# Patient Record
Sex: FEMALE | Race: BLACK OR AFRICAN AMERICAN | NOT HISPANIC OR LATINO | Employment: FULL TIME | URBAN - METROPOLITAN AREA
[De-identification: names, ages, dates, MRNs, and addresses within clinical notes are randomized per-mention and may not be internally consistent; named-entity substitution may affect disease eponyms.]

---
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
## 2018-01-20 ENCOUNTER — OFFICE VISIT (OUTPATIENT)
Dept: URGENT CARE | Facility: URGENT CARE | Age: 18
End: 2018-01-20
Payer: COMMERCIAL

## 2018-01-20 ENCOUNTER — RADIANT APPOINTMENT (OUTPATIENT)
Dept: GENERAL RADIOLOGY | Facility: CLINIC | Age: 18
End: 2018-01-20
Attending: PEDIATRICS
Payer: COMMERCIAL

## 2018-01-20 VITALS
SYSTOLIC BLOOD PRESSURE: 113 MMHG | RESPIRATION RATE: 16 BRPM | TEMPERATURE: 97.1 F | BODY MASS INDEX: 44.99 KG/M2 | OXYGEN SATURATION: 100 % | HEART RATE: 84 BPM | WEIGHT: 242.8 LBS | DIASTOLIC BLOOD PRESSURE: 79 MMHG

## 2018-01-20 DIAGNOSIS — M25.562 ACUTE PAIN OF LEFT KNEE: ICD-10-CM

## 2018-01-20 DIAGNOSIS — M25.562 ACUTE PAIN OF LEFT KNEE: Primary | ICD-10-CM

## 2018-01-20 PROCEDURE — 99213 OFFICE O/P EST LOW 20 MIN: CPT | Performed by: PEDIATRICS

## 2018-01-20 PROCEDURE — 73560 X-RAY EXAM OF KNEE 1 OR 2: CPT | Mod: LT

## 2018-01-20 ASSESSMENT — ENCOUNTER SYMPTOMS
EYE DISCHARGE: 0
TREMORS: 0
DIZZINESS: 0
FALLS: 1
PALPITATIONS: 0
HEADACHES: 0
FEVER: 0
CHILLS: 0
BLURRED VISION: 0
WEIGHT LOSS: 0
DOUBLE VISION: 0
BRUISES/BLEEDS EASILY: 0
TINGLING: 0

## 2018-01-20 NOTE — NURSING NOTE
"Chief Complaint   Patient presents with     Musculoskeletal Problem     left knee pain       Initial /79 (BP Location: Left arm, Patient Position: Chair, Cuff Size: Adult Large)  Pulse 84  Temp 97.1  F (36.2  C) (Oral)  Resp 16  Wt 242 lb 12.8 oz (110.1 kg)  SpO2 100%  Breastfeeding? No  BMI 44.99 kg/m2 Estimated body mass index is 44.99 kg/(m^2) as calculated from the following:    Height as of 11/21/17: 5' 1.6\" (1.565 m).    Weight as of this encounter: 242 lb 12.8 oz (110.1 kg).  Medication Reconciliation: complete       Mercy Bergman MA      "

## 2018-01-20 NOTE — PROGRESS NOTES
SUBJECTIVE:   Kat Rodriguez is a 17 year old female presenting with a chief complaint of   Chief Complaint   Patient presents with     Musculoskeletal Problem     left knee pain   .    Onset of symptoms was 2 day(s) ago.  Location: left knee  Context:       The injury happened while outside GYM activities (non school related)       Mechanism: trauma: landed on left knee and heard a cracking      Patient experienced can't straighten out, weight on it will hurt  Course of symptoms is worsening.    Severity moderate  Current and Associated symptoms: Pain  Denies  Swelling, Bruising, Warmth, Redness and Tenderness  Aggravating Factors: weight-bearing, movement, flexion/extension and standing  Therapies to improve symptoms include: ice, ibuprofen and elevation  This is the first time this type of problem has occurred for this patient.     Review of Systems   Constitutional: Negative for chills, fever, malaise/fatigue and weight loss.   Eyes: Negative for blurred vision, double vision and discharge.   Cardiovascular: Negative for chest pain, palpitations and leg swelling.   Musculoskeletal: Positive for falls.   Skin: Negative for rash.   Neurological: Negative for dizziness, tingling, tremors and headaches.   Endo/Heme/Allergies: Does not bruise/bleed easily.         Past Medical History:   Diagnosis Date     Asthma      Current Outpatient Prescriptions   Medication Sig Dispense Refill     order for DME Equipment being ordered: L Knee sleeve, crutches 1 each 0     fluticasone (FLOVENT HFA) 110 MCG/ACT inhaler Inhale 2 puffs into the lungs 2 times daily for asthma prevention. 1 Inhaler 2     albuterol (ALBUTEROL) 108 (90 BASE) MCG/ACT inhaler Inhale 2 puffs into the lungs every 4 hours as needed for asthma symptoms. 2 Inhaler 1     Spacer/Aero Chamber Mouthpiece MISC Use with albuterol and flovent 2 each 0     ipratropium - albuterol 0.5 mg/2.5 mg/3 mL (DUONEB) 0.5-2.5 (3) MG/3ML neb solution Take 1 vial (3 mLs) by  nebulization once for 1 dose 3 mL 0     EPINEPHrine (EPIPEN) 0.3 MG/0.3ML injection Inject 0.3 mLs into the muscle once as needed for anaphylaxis. (Patient not taking: Reported on 1/20/2018) 2 each 3     Social History   Substance Use Topics     Smoking status: Passive Smoke Exposure - Never Smoker     Smokeless tobacco: Never Used      Comment: father smokes     Alcohol use No       OBJECTIVE  113/79  HR 84  AF  DEBBIE  Physical Exam   Constitutional: She is oriented to person, place, and time.   Musculoskeletal: She exhibits no edema, tenderness or deformity.   Neurological: She is oriented to person, place, and time. GCS score is 15.   Cannot bear weight on the L, unable to straighten L knee actively or passively   Skin: Skin is warm and dry. No rash noted. No erythema.   Psychiatric: Affect normal.       Labs:  No results found for this or any previous visit (from the past 24 hour(s)).    X-Ray was done, my findings are: no fracture seen    ASSESSMENT:      ICD-10-CM    1. Acute pain of left knee M25.562 XR Knee Left 1/2 Views     order for DME        Medical Decision Making:    Serious Comorbid Conditions:  Adult:  obesity    PLAN:  Knee sleeve  Regular exercises of the L knee  Crutches  F/u with PCP in 4-5 days. Recommend PT referral at that time if no improvement    Followup:    If not improving or if condition worsens, follow up with your Primary Care Provider    Kei Corbett MD, MPH

## 2018-01-20 NOTE — MR AVS SNAPSHOT
After Visit Summary   1/20/2018    Kat Rodriguez    MRN: [id number]           Patient Information     Date Of Birth          2000        Visit Information        Provider Department      1/20/2018 9:45 AM Kei Corbett MD [facility]        Today's Diagnoses     Acute pain of left knee    -  1       Follow-ups after your visit        Who to contact     If you have questions or need follow up information about today's clinic visit or your schedule please contact [facility] directly at [phone].  Normal or non-critical lab and imaging results will be communicated to you by InGameNowhart, letter or phone within 4 business days after the clinic has received the results. If you do not hear from us within 7 days, please contact the clinic through MarketBridget or phone. If you have a critical or abnormal lab result, we will notify you by phone as soon as possible.  Submit refill requests through WaveRx or call your pharmacy and they will forward the refill request to us. Please allow 3 business days for your refill to be completed.          Additional Information About Your Visit        MyChart Information     [employer] gives you secure access to your electronic health record. If you see a primary care provider, you can also send messages to your care team and make appointments. If you have questions, please call your primary care clinic.  If you do not have a primary care provider, please call [phone] and they will assist you.        Care EveryWhere ID     This is your Care EveryWhere ID. This could be used by other organizations to access your [place] medical records  [id number]        Your Vitals Were     Pulse Temperature Respirations Pulse Oximetry Breastfeeding? BMI (Body Mass Index)    84 97.1  F (36.2  C) (Oral) 16 100% No 44.99 kg/m2       Blood Pressure from Last 3 Encounters:   01/20/18 113/79   11/19/17 109/72   11/15/17 114/75    Weight from Last 3  Encounters:   01/20/18 242 lb 12.8 oz (110.1 kg) (>99 %)*   11/21/17 233 lb 4.8 oz (105.8 kg) (>99 %)*   11/19/17 241 lb (109.3 kg) (>99 %)*     * Growth percentiles are based on [facility] 2-20 Years data.                 Today's Medication Changes          These changes are accurate as of: 1/20/18 12:59 PM.  If you have any questions, ask your nurse or doctor.               Start taking these medicines.        Dose/Directions    order for DME   Used for:  Acute pain of left knee   Started by:  Kei Corbett MD        Equipment being ordered: L Knee sleeve, crutches   Quantity:  1 each   Refills:  0            Where to get your medicines      Some of these will need a paper prescription and others can be bought over the counter.  Ask your nurse if you have questions.     Bring a paper prescription for each of these medications     order for DME                Primary Care Provider    Martita Miramontes PA-C       No address on file        Equal Access to Services     JERROD ROWAN : Hadii aad ku hadasho Sorosana, waaxda luqadaha, qaybta kaalmada adeegyada, katelyn madden . So [facility] [phone].    ATENCIÓN: Si habla español, tiene a guerrier disposición servicios gratuitos de asistencia lingüística. Llame al [phone].    We comply with applicable federal civil rights laws and [place] laws. We do not discriminate on the basis of race, color, national origin, age, disability, sex, sexual orientation, or gender identity.            Thank you!     Thank you for choosing [facility]  for your care. Our goal is always to provide you with excellent care. Hearing back from our patients is one way we can continue to improve our services. Please take a few minutes to complete the written survey that you may receive in the mail after your visit with us. Thank you!             Your Updated Medication List - Protect others around you: Learn how to safely use, store and throw away your  medicines at www.disposemymeds.org.          This list is accurate as of: 1/20/18 12:59 PM.  Always use your most recent med list.                   Brand Name Dispense Instructions for use Diagnosis    albuterol 108 (90 BASE) MCG/ACT Inhaler    PROAIR HFA    2 Inhaler    Inhale 2 puffs into the lungs every 4 hours as needed for asthma symptoms.    Mild persistent asthma without complication       EPINEPHrine 0.3 MG/0.3ML injection    EPIPEN    2 each    Inject 0.3 mLs into the muscle once as needed for anaphylaxis.    Allergy to peanuts       fluticasone 110 MCG/ACT Inhaler    FLOVENT HFA    1 Inhaler    Inhale 2 puffs into the lungs 2 times daily for asthma prevention.    Mild persistent asthma without complication       ipratropium - albuterol 0.5 mg/2.5 mg/3 mL 0.5-2.5 (3) MG/3ML neb solution    DUONEB    3 mL    Take 1 vial (3 mLs) by nebulization once for 1 dose    Acute bronchospasm       order for DME     1 each    Equipment being ordered: L Knee sleeve, crutches    Acute pain of left knee       Spacer/Aero Chamber Mouthpiece Misc     2 each    Use with albuterol and flovent    Mild persistent asthma

## 2018-02-06 ENCOUNTER — APPOINTMENT (OUTPATIENT)
Dept: GENERAL RADIOLOGY | Facility: CLINIC | Age: 18
End: 2018-02-06
Attending: EMERGENCY MEDICINE
Payer: COMMERCIAL

## 2018-02-06 ENCOUNTER — HOSPITAL ENCOUNTER (EMERGENCY)
Facility: CLINIC | Age: 18
Discharge: HOME | End: 2018-02-06
Attending: EMERGENCY MEDICINE | Admitting: EMERGENCY MEDICINE
Payer: COMMERCIAL

## 2018-02-06 VITALS
RESPIRATION RATE: 16 BRPM | DIASTOLIC BLOOD PRESSURE: 64 MMHG | SYSTOLIC BLOOD PRESSURE: 110 MMHG | HEART RATE: 74 BPM | TEMPERATURE: 97.3 F | OXYGEN SATURATION: 97 %

## 2018-02-06 DIAGNOSIS — M25.562 ACUTE PAIN OF LEFT KNEE: ICD-10-CM

## 2018-02-06 PROCEDURE — 99283 EMERGENCY DEPT VISIT LOW MDM: CPT | Mod: Z6 | Performed by: EMERGENCY MEDICINE

## 2018-02-06 PROCEDURE — 25000132 ZZH RX MED GY IP 250 OP 250 PS 637: Performed by: EMERGENCY MEDICINE

## 2018-02-06 PROCEDURE — 73562 X-RAY EXAM OF KNEE 3: CPT | Mod: LT

## 2018-02-06 PROCEDURE — 99283 EMERGENCY DEPT VISIT LOW MDM: CPT | Performed by: EMERGENCY MEDICINE

## 2018-02-06 RX ORDER — NAPROXEN 500 MG/1
500 TABLET ORAL 2 TIMES DAILY WITH MEALS
Qty: 16 TABLET | Refills: 0 | Status: SHIPPED | OUTPATIENT
Start: 2018-02-06 | End: 2018-02-14

## 2018-02-06 RX ORDER — IBUPROFEN 600 MG/1
600 TABLET, FILM COATED ORAL ONCE
Status: COMPLETED | OUTPATIENT
Start: 2018-02-06 | End: 2018-02-06

## 2018-02-06 RX ADMIN — IBUPROFEN 600 MG: 600 TABLET ORAL at 11:10

## 2018-02-06 ASSESSMENT — ENCOUNTER SYMPTOMS
ARTHRALGIAS: 1
WEAKNESS: 0
COLOR CHANGE: 0
NUMBNESS: 0
WOUND: 0
NECK STIFFNESS: 0
NECK PAIN: 0
ABDOMINAL PAIN: 0
SHORTNESS OF BREATH: 0
FEVER: 0

## 2018-02-06 NOTE — ED PROVIDER NOTES
History     Chief Complaint   Patient presents with     Knee Injury     hurt her left knee yesterday while at practice for [employer]  Kat Rodriguez is a 17 year old female who presents with a left knee injury. The patient reports she injured her left knee a few weeks ago while practicing [employer]. She states her pain improved without treatment so she returned to practice yesterday; however, while at [employer] yesterday she states she went for a takedown and felt a pop in her left knee. She states she now has non-localized, constant, diffuse, sharp, nonradiating left knee pain with inability to bear weight.  Movement makes it worse, rest improves it.  She does not have a history of injury on this knee. She has a previous ACL, MCL and meniscus tear in her right knee. She denies any other injury.  No fevers or skin color changes in left lower extremity.    Past Medical History:   Diagnosis Date     Asthma        Past Surgical History:   Procedure Laterality Date     ARTHROSCOPIC RECONSTRUCTION ANTERIOR CRUCIATE LIGAMENT Right 1/29/2015    Procedure: ARTHROSCOPIC RECONSTRUCTION ANTERIOR CRUCIATE LIGAMENT;  Surgeon: Emile Menendez MD;  Location: [facility] OR     NO HISTORY OF SURGERY         Family History   Problem Relation Age of Onset     Obesity Mother      Hypertension Maternal Grandmother      Hyperlipidemia Maternal Grandmother      Obesity Maternal Grandfather        Social History   Substance Use Topics     Smoking status: Passive Smoke Exposure - Never Smoker     Smokeless tobacco: Never Used      Comment: father smokes     Alcohol use No       No current facility-administered medications for this encounter.      Current Outpatient Prescriptions   Medication     naproxen (NAPROSYN) 500 MG tablet     order for DME     ipratropium - albuterol 0.5 mg/2.5 mg/3 mL (DUONEB) 0.5-2.5 (3) MG/3ML neb solution     fluticasone (FLOVENT HFA) 110 MCG/ACT inhaler     albuterol (ALBUTEROL) 108 (90 BASE) MCG/ACT inhaler     EPINEPHrine  (EPIPEN) 0.3 MG/0.3ML injection     Spacer/Aero Chamber Mouthpiece MISC        Allergies   Allergen Reactions     Cats Itching     Fish      Nuts      Tree nuts, not peanuts     Trees Swelling         I have reviewed the Medications, Allergies, Past Medical and Surgical History, and Social History in the Epic system.    Review of Systems   Constitutional: Negative for fever.   Respiratory: Negative for shortness of breath.    Cardiovascular: Negative for chest pain.   Gastrointestinal: Negative for abdominal pain.   Musculoskeletal: Positive for arthralgias. Negative for neck pain and neck stiffness.   Skin: Negative for color change, pallor and wound.   Neurological: Negative for weakness and numbness.   All other systems reviewed and are negative.      Physical Exam   BP: 110/64  Pulse: 74  Temp: 97.3  F (36.3  C)  Resp: 16  SpO2: 97 %      Physical Exam   Constitutional: She is oriented to person, place, and time. She appears well-developed and well-nourished. No distress.   HENT:   Head: Normocephalic and atraumatic.   Eyes: Conjunctivae and EOM are normal. No scleral icterus.   Neck: Normal range of motion.   Cardiovascular: Normal rate and intact distal pulses.    Pulses:       Dorsalis pedis pulses are 2+ on the left side.        Posterior tibial pulses are 2+ on the left side.   Pulmonary/Chest: Effort normal. No respiratory distress.   Musculoskeletal: Normal range of motion. She exhibits tenderness. She exhibits no edema or deformity.   Tenderness to medial and anterior left knee.  No obvious deformity in left knee joint.  Full range of motion, active and passive, in left hip, knee, and ankle with pain in left knee with range of motion.  No tenderness over left ankle and left hip.   Neurological: She is alert and oriented to person, place, and time. She has normal strength and normal reflexes. She displays normal reflexes. No cranial nerve deficit or sensory deficit. She exhibits normal muscle tone. Gait (  due to pain in left leg) abnormal. Coordination normal. GCS eye subscore is 4. GCS verbal subscore is 5. GCS motor subscore is 6.   Reflex Scores:       Patellar reflexes are 2+ on the right side and 2+ on the left side.       Achilles reflexes are 2+ on the right side and 2+ on the left side.  Skin: Skin is warm. No rash noted. She is not diaphoretic. No erythema.   Psychiatric: She has a normal mood and affect. Her behavior is normal. Judgment and thought content normal.   Nursing note and vitals reviewed.      ED Course     ED Course     Procedures             Critical Care time:  none             Labs Ordered and Resulted from Time of ED Arrival Up to the Time of Departure from the ED - No data to display         Assessments & Plan (with Medical Decision Making)   17-year-old woman presenting with left knee pain. Differential diagnosis sprain, ligamentous injury, likely fracture, unlikely DVT or septic arthritis.    After thorough history and physical exam, patient appears to be in no acute distress.  I will obtain an x-ray of the left knee for further diagnostic evaluation and treat her pain with oral ibuprofen. She and her father agreed with the plan.     I reviewed patient's x-ray and I read the radiology report; there is no evidence of knee fracture. I went to speak to the patient and her father about the results, however, they were not in the room. I was informed by nursing that the patient and her father eloped from the Emergency Department.    I have reviewed the nursing notes.    I have reviewed the findings, diagnosis, plan and need for follow up with the patient.    Discharge Medication List as of 2/6/2018 12:52 PM      START taking these medications    Details   naproxen (NAPROSYN) 500 MG tablet Take 1 tablet (500 mg) by mouth 2 times daily (with meals) for 8 days, Disp-16 tablet, R-0, Local Print             Final diagnoses:   Acute pain of left knee   ISrinivas, am serving as a trained  medical scribe to document services personally performed by Augustus Locke MD, based on the provider's statements to me.      I, Augustus Locke MD, was physically present and have reviewed and verified the accuracy of this note documented by Srinivas Kennedy.       2/6/2018   [facility], [place], EMERGENCY DEPARTMENT     Robi Locke MD  02/06/18 [id number]

## 2018-02-06 NOTE — ED AVS SNAPSHOT
[facility], Emergency Department    [address]    [place] [address]    Phone:  [phone]    Fax:  [phone]                                       Kat Rodriguez   MRN: [id number]    Department:  [facility], Emergency Department   Date of Visit:  2/6/2018           Patient Information     Date Of Birth          2000        Your diagnoses for this visit were:     Acute pain of left knee        You were seen by Robi Locke MD.        Discharge Instructions       Please make an appointment to follow up with Sports Medicine (phone: [phone]) in 7 days for further evaluation and recommendations.  Please use the knee immobilizer and crutches to help with ambulation.  Please take naproxen for pain, if needed.      Discharge References/Attachments     KNEE PAIN (ENGLISH)      24 Hour Appointment Hotline       To make an appointment at any [facility] clinic, call [phone] ([phone]). If you don't have a family doctor or clinic, we will help you find one. [facility] clinics are conveniently located to serve the needs of you and your family.          ED Discharge Orders     Knee immobilizer                    Review of your medicines      START taking        Dose / Directions Last dose taken    naproxen 500 MG tablet   Commonly known as:  NAPROSYN   Dose:  500 mg   Quantity:  16 tablet        Take 1 tablet (500 mg) by mouth 2 times daily (with meals) for 8 days   Refills:  0          Our records show that you are taking the medicines listed below. If these are incorrect, please call your family doctor or clinic.        Dose / Directions Last dose taken    albuterol 108 (90 BASE) MCG/ACT Inhaler   Commonly known as:  PROAIR HFA   Dose:  2 puff   Quantity:  2 Inhaler        Inhale 2 puffs into the lungs every 4 hours as needed for asthma symptoms.   Refills:  1        EPINEPHrine 0.3 MG/0.3ML injection   Commonly known as:  EPIPEN   Dose:  0.3 mg   Quantity:  2 each        Inject 0.3  mLs into the muscle once as needed for anaphylaxis.   Refills:  3        fluticasone 110 MCG/ACT Inhaler   Commonly known as:  FLOVENT HFA   Dose:  2 puff   Quantity:  1 Inhaler        Inhale 2 puffs into the lungs 2 times daily for asthma prevention.   Refills:  2        ipratropium - albuterol 0.5 mg/2.5 mg/3 mL 0.5-2.5 (3) MG/3ML neb solution   Commonly known as:  DUONEB   Dose:  1 vial   Quantity:  3 mL        Take 1 vial (3 mLs) by nebulization once for 1 dose   Refills:  0        order for DME   Quantity:  1 each        Equipment being ordered: L Knee sleeve, crutches   Refills:  0        Spacer/Aero Chamber Mouthpiece Misc   Quantity:  2 each        Use with albuterol and flovent   Refills:  0                Prescriptions were sent or printed at these locations (1 Prescription)                   Other Prescriptions                Printed at Department/Unit printer (1 of 1)         naproxen (NAPROSYN) 500 MG tablet                Procedures and tests performed during your visit     Knee XR, 3 views, left      Orders Needing Specimen Collection     None      Pending Results     No orders found from 2/4/2018 to 2/7/2018.            Pending Culture Results     No orders found from 2/4/2018 to 2/7/2018.            Pending Results Instructions     If you had any lab results that were not finalized at the time of your Discharge, you can call the ED Lab Result RN at [phone]. You will be contacted by this team for any positive Lab results or changes in treatment. The nurses are available 7 days a week from 10A to 6:30P.  You can leave a message 24 hours per day and they will return your call.        Thank you for choosing [facility]       Thank you for choosing [facility] for your care. Our goal is always to provide you with excellent care. Hearing back from our patients is one way we can continue to improve our services. Please take a few minutes to complete the written survey that you may receive in the mail after  you visit with us. Thank you!        [employer] Information     [facility] [employer] gives you secure access to your electronic health record. If you see a primary care provider, you can also send messages to your care team and make appointments. If you have questions, please call your primary care clinic.  If you do not have a primary care provider, please call [phone] and they will assist you.        Care EveryWhere ID     This is your Care EveryWhere ID. This could be used by other organizations to access your [facility] medical records  [id number]        Equal Access to Services     NANCY ROWAN : Mauro Maza, joseph fuller, destiny newellaldemetria jaramillo, katelyn madden . So [facility] [phone].    ATENCIÓN: Si habla español, tiene a guerrier disposición servicios gratuitos de asistencia lingüística. Llame al [phone].    We comply with applicable federal civil rights laws and [place] laws. We do not discriminate on the basis of race, color, national origin, age, disability, sex, sexual orientation, or gender identity.            After Visit Summary       This is your record. Keep this with you and show to your community pharmacist(s) and doctor(s) at your next visit.

## 2018-02-06 NOTE — ED AVS SNAPSHOT
[facility], [place], Emergency Department    [address]    [facility] [address]    Phone:  [phone]    Fax:  [phone]                                       Kat Rodriguez   MRN: [id number]    Department:  [facility], Emergency Department   Date of Visit:  2/6/2018           After Visit Summary Signature Page     I have received my discharge instructions, and my questions have been answered. I have discussed any challenges I see with this plan with the nurse or doctor.    ..........................................................................................................................................  Patient/Patient Representative Signature      ..........................................................................................................................................  Patient Representative Print Name and Relationship to Patient    ..................................................               ................................................  Date                                            Time    ..........................................................................................................................................  Reviewed by Signature/Title    ...................................................              ..............................................  Date                                                            Time

## 2018-02-07 ENCOUNTER — OFFICE VISIT (OUTPATIENT)
Dept: ORTHOPEDICS | Facility: CLINIC | Age: 18
End: 2018-02-07
Payer: COMMERCIAL

## 2018-02-07 VITALS — WEIGHT: 242 LBS | BODY MASS INDEX: 44.53 KG/M2 | RESPIRATION RATE: 16 BRPM | HEIGHT: 62 IN

## 2018-02-07 DIAGNOSIS — S89.92XA KNEE INJURY, LEFT, INITIAL ENCOUNTER: Primary | ICD-10-CM

## 2018-02-07 PROCEDURE — 99214 OFFICE O/P EST MOD 30 MIN: CPT | Performed by: ORTHOPAEDIC SURGERY

## 2018-02-07 ASSESSMENT — PAIN SCALES - GENERAL: PAINLEVEL: MILD PAIN (3)

## 2018-02-07 NOTE — MR AVS SNAPSHOT
After Visit Summary   2/7/2018    Kat Rodriguez    MRN: [id number]           Patient Information     Date Of Birth          2000        Visit Information        Provider Department      2/7/2018 3:30 PM Emile Menendez MD [facility]        Today's Diagnoses     Knee injury, left, initial encounter    -  1      Care Instructions    Please remember to call and schedule a follow up appointment if one was recommended at your earliest convenience.  Orthopedics CLINIC HOURS TELEPHONE NUMBER   Dr. Gemma Mariee  Certified Medical Assistant   Monday & Wednesday   8am - 5pm  Thursday 1pm - 5pm  Friday 8am -11:30am Specialty schedulers:   [phone] to make an appointment with any Specialty Provider.   Main Clinic:   [phone] to make an appointment with your primary provider   Urgent Care locations:    [facility] Monday-Friday Closed  Saturday-Sunday 9am-5pm      Monday-Friday 12pm - 8pm  Saturday-Sunday 9am-5pm [phone]     If SURGERY has been recommended, please call our Specialty Schedulers at [phone] to schedule your procedure.    If you need a medication refill, please contact your pharmacy. Please allow 3 business days for your refill to be completed.    If an MRI or CT scan has been recommended, please call [facility] Schedulers at [phone] to schedule your appointment.  Use Asterest (secure e-mail communication and access to your chart) to send a message or to make an appointment. Please ask how you can sign up for [employer]os[employer].  Your care team's suggested websites for health information:   [email or web address] : Up to date and easily searchable information on multiple topics.   [email or web address] : [place] dept of heatl, public health issues in [place], N1N1              Follow-ups after your visit        Your next 10 appointments already scheduled     Feb 09, 2018 11:45 AM CST   (Arrive by 11:30  AM)   MR KNEE LEFT W/O CONTRAST with MGMR1   [facility] ([facility])    [address] [address] [place] [id number] [phone]           Take your medicines as usual, unless your doctor tells you not to. Bring a list of your current medicines to your exam (including vitamins, minerals and over-the-counter drugs). Also bring the results of similar scans you may have had.  Please remove any body piercings and hair extensions before you arrive.  Follow your doctor s orders. If you do not, we may have to postpone your exam.  You may or may not receive IV contrast for this exam pending the discretion of the Radiologist.  You do not need to do anything special to prepare.  The MRI machine uses a strong magnet. Please wear clothes without metal (snaps, zippers). A sweatsuit works well, or we may give you a hospital gown.   **IMPORTANT** THE INSTRUCTIONS BELOW ARE ONLY FOR THOSE PATIENTS WHO HAVE BEEN PRESCRIBED SEDATION OR GENERAL ANESTHESIA DURING THEIR MRI PROCEDURE:  IF YOUR DOCTOR PRESCRIBED ORAL SEDATION (take medicine to help you relax during your exam):   You must get the medicine from your doctor (oral medication) before you arrive. Bring the medicine to the exam. Do not take it at home. You ll be told when to take it upon arriving for your exam.   Arrive one hour early. Bring someone who can take you home after the test. Your medicine will make you sleepy. After the exam, you may not drive, take a bus or take a taxi by yourself.  IF YOUR DOCTOR PRESCRIBED IV SEDATION:   Arrive one hour early. Bring someone who can take you home after the test. Your medicine will make you sleepy. After the exam, you may not drive, take a bus or take a taxi by yourself.   No eating 6 hours before your exam. You may have clear liquids up until 4 hours before your exam. (Clear liquids include water, clear tea, black coffee and fruit juice without pulp.)  IF YOUR DOCTOR PRESCRIBED ANESTHESIA  (be asleep for your exam):   Arrive 1 1/2 hours early. Bring someone who can take you home after the test. You may not drive, take a bus or take a taxi by yourself.   No eating 8 hours before your exam. You may have clear liquids up until 4 hours before your exam. (Clear liquids include water, clear tea, black coffee and fruit juice without pulp.)   You will spend four to five hours in the recovery room.  Please call the Imaging Department at your exam site with any questions.              Future tests that were ordered for you today     Open Future Orders        Priority Expected Expires Ordered    MR Knee Left w/o Contrast Routine  2/7/2019 2/7/2018            Who to contact     If you have questions or need follow up information about today's clinic visit or your schedule please contact [facility] directly at [phone].  Normal or non-critical lab and imaging results will be communicated to you by MyChart, letter or phone within 4 business days after the clinic has received the results. If you do not hear from us within 7 days, please contact the clinic through Goblinworkst or phone. If you have a critical or abnormal lab result, we will notify you by phone as soon as possible.  Submit refill requests through Zuffle or call your pharmacy and they will forward the refill request to us. Please allow 3 business days for your refill to be completed.          Additional Information About Your Visit        MyChart Information     Zuffle gives you secure access to your electronic health record. If you see a primary care provider, you can also send messages to your care team and make appointments. If you have questions, please call your primary care clinic.  If you do not have a primary care provider, please call [phone] and they will assist you.        Care EveryWhere ID     This is your Care EveryWhere ID. This could be used by other organizations to access your [facility]  "records  [id number]        Your Vitals Were     Respirations Height BMI (Body Mass Index)             16 5' 1.6\" (1.565 m) 44.84 kg/m2          Blood Pressure from Last 3 Encounters:   02/06/18 110/64   01/20/18 113/79   11/19/17 109/72    Weight from Last 3 Encounters:   02/07/18 242 lb (109.8 kg) (>99 %)*   01/20/18 242 lb 12.8 oz (110.1 kg) (>99 %)*   11/21/17 233 lb 4.8 oz (105.8 kg) (>99 %)*     * Growth percentiles are based on [facility] 2-20 Years data.               Primary Care Provider    Martita Miramontes PA-C       No address on file        Equal Access to Services     NANCY ROWAN : Mauro kimbleo Soomaali, waaxda luqadaha, qaybta kaalmada adeegyada, katelyn madden . So [facility] [phone].    ATENCIÓN: Si habla español, tiene a guerrier disposición servicios gratuitos de asistencia lingüística. Llame al [phone].    We comply with applicable federal civil rights laws and [place] laws. We do not discriminate on the basis of race, color, national origin, age, disability, sex, sexual orientation, or gender identity.            Thank you!     Thank you for choosing [facility]  for your care. Our goal is always to provide you with excellent care. Hearing back from our patients is one way we can continue to improve our services. Please take a few minutes to complete the written survey that you may receive in the mail after your visit with us. Thank you!             Your Updated Medication List - Protect others around you: Learn how to safely use, store and throw away your medicines at www.disposemymeds.org.          This list is accurate as of 2/7/18  8:48 PM.  Always use your most recent med list.                   Brand Name Dispense Instructions for use Diagnosis    albuterol 108 (90 BASE) MCG/ACT Inhaler    PROAIR HFA    2 Inhaler    Inhale 2 puffs into the lungs every 4 hours as needed for asthma symptoms.    Mild persistent asthma without complication "       EPINEPHrine 0.3 MG/0.3ML injection    EPIPEN    2 each    Inject 0.3 mLs into the muscle once as needed for anaphylaxis.    Allergy to peanuts       fluticasone 110 MCG/ACT Inhaler    FLOVENT HFA    1 Inhaler    Inhale 2 puffs into the lungs 2 times daily for asthma prevention.    Mild persistent asthma without complication       ipratropium - albuterol 0.5 mg/2.5 mg/3 mL 0.5-2.5 (3) MG/3ML neb solution    DUONEB    3 mL    Take 1 vial (3 mLs) by nebulization once for 1 dose    Acute bronchospasm       naproxen 500 MG tablet    NAPROSYN    16 tablet    Take 1 tablet (500 mg) by mouth 2 times daily (with meals) for 8 days        order for DME     1 each    Equipment being ordered: L Knee sleeve, crutches    Acute pain of left knee       Spacer/Aero Chamber Mouthpiece Misc     2 each    Use with albuterol and flovent    Mild persistent asthma

## 2018-02-07 NOTE — PROGRESS NOTES
chief complaint:   Chief Complaint   Patient presents with     Knee left     Left knee injury while practicing [employer] 1/18/2018 landed on left knee and heard a cracking . She states her pain improved without treatment so she returned to practice however, while at [employer] 2/5/2018 landed wrong and gradually getting worse. History of  ACL, MCL and meniscus tear in her right knee. Treatment includes ice, ibuprofen.        HISTORY OF PRESENT ILLNESS    Kat Rodriguez is a 17 year old female seen for evaluation of a left knee injury that occurred on 1/18/2018. The injury occurred while practicing [employer]. She landed onto her knee and heard a crack. She had immediate pain but eventually improved so she returned to practice. She had practice again on 2/5/2018 and landed wrong. Since then, her pain has gradually worsened. Today she has mild pain, rated a 3/10. Pain is located over the medial and lateral aspects of the knee. Also feels she has medial knee instability with walking. For treatment, she has been taking and ibuprofen.    Of note: she has a history of ACL, MCL and meniscus tear in her right knee. Treated with reconstructive surgery by me on 1/29/2015.    Present symptoms: mild knee pain, + swelling.    Symptoms occur with walking.    The frequency of symptoms frequently.  Pain severity: 3/10  Pain quality: dull and aching  Exacerbating Factors: weight bearing, stairs  Relieving Factors: rest  Night Pain: No  Pain while at rest: No   Patient has tried:     NSAIDS: Yes     Physical Therapy: No      Activity modification: Yes      Bracing: No      Injections: No     Ice: Yes      Other: crutches.      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    Orthopedic PMH: history of ACL, MCL and medial meniscus tear in the right knee, treated with reconstructive surgery.    Other PMH:  has a past medical history of Asthma.  Patient Active Problem List    Diagnosis  Date Noted     Obesity, Class III, BMI 40-49.9 (morbid obesity) (H) 04/18/2016     Priority: Medium     S/P ACL reconstruction 02/04/2015     Priority: Medium     Medial meniscus tear 12/24/2014     Priority: Medium     Tear of lateral cartilage or meniscus of knee, current 12/24/2014     Priority: Medium     Sprain of MCL (medial collateral ligament) of knee 12/24/2014     Priority: Medium     Learning problem? 07/31/2013     Priority: Medium     Marked on form by mom, but in office visit denied learning problems.  Discuss at next office visit.        Irregular menses 07/31/2013     Priority: Medium     Future orders for labs for PCOS.         Vitamin D deficiency disease 07/31/2013     Priority: Medium     Obesity peds (BMI >=95 percentile) 06/29/2012     Priority: Medium     Seen by weight management (Dr. Boudreaux):  Per last note, 'not ready to make changes as of yet'.  July 2013- plans to see soon.  Labs ordered for future fasting order.       Mild persistent asthma 10/17/2011     Priority: Medium     Allergy to peanuts 06/19/2006     Priority: Medium     Has Epi Pen.         Surgical Hx:  has a past surgical history that includes no history of surgery and Arthroscopic reconstruction anterior cruciate ligament (Right, 1/29/2015).    Medications:   Current Outpatient Prescriptions:      naproxen (NAPROSYN) 500 MG tablet, Take 1 tablet (500 mg) by mouth 2 times daily (with meals) for 8 days, Disp: 16 tablet, Rfl: 0     order for DME, Equipment being ordered: L Knee sleeve, crutches, Disp: 1 each, Rfl: 0     ipratropium - albuterol 0.5 mg/2.5 mg/3 mL (DUONEB) 0.5-2.5 (3) MG/3ML neb solution, Take 1 vial (3 mLs) by nebulization once for 1 dose, Disp: 3 mL, Rfl: 0     fluticasone (FLOVENT HFA) 110 MCG/ACT inhaler, Inhale 2 puffs into the lungs 2 times daily for asthma prevention., Disp: 1 Inhaler, Rfl: 2     albuterol (ALBUTEROL) 108 (90 BASE) MCG/ACT inhaler, Inhale 2 puffs into the lungs every 4 hours as needed for  "asthma symptoms., Disp: 2 Inhaler, Rfl: 1     EPINEPHrine (EPIPEN) 0.3 MG/0.3ML injection, Inject 0.3 mLs into the muscle once as needed for anaphylaxis., Disp: 2 each, Rfl: 3     Spacer/Aero Chamber Mouthpiece MISC, Use with albuterol and flovent, Disp: 2 each, Rfl: 0    Allergies:   Allergies   Allergen Reactions     Cats Itching     Fish      Nuts      Tree nuts, not peanuts     Trees Swelling       Social Hx:  reports that she is a non-smoker but has been exposed to tobacco smoke. She has never used smokeless tobacco. She reports that she does not drink alcohol or use illicit drugs.    Family Hx: family history includes Hyperlipidemia in her maternal grandmother; Hypertension in her maternal grandmother; Obesity in her maternal grandfather and mother.    REVIEW OF SYSTEMS: 10 point ROS neg other than the symptoms noted above in the HPI and PMH. Notables include  CONSTITUTIONAL:NEGATIVE for fever, chills, change in weight  INTEGUMENTARY/SKIN: NEGATIVE for worrisome rashes, moles or lesions  MUSCULOSKELETAL:See HPI above  NEURO: NEGATIVE for weakness, dizziness or paresthesias    This document serves as a record of the services and decisions personally performed and made by Emile Menendez MD. It was created on his behalf by Francia Gregory, a trained medical scribe. The creation of this document is based the provider's statements to the medical scribe.    Robert Gregory 3:40 PM 2/7/2018    PHYSICAL EXAM:  Resp 16  Ht 1.565 m (5' 1.6\")  Wt 109.8 kg (242 lb)  BMI 44.84 kg/m2   GENERAL APPEARANCE: healthy, alert, no distress; accompanied by her friend.  SKIN: no suspicious lesions or rashes  NEURO: Normal strength and tone, mentation intact and speech normal  PSYCH:  mentation appears normal and affect normal, not anxious  RESPIRATORY: No increased work of breathing.    Hands: no clubbing, nail pitting or nodes.    BILATERAL LOWER EXTREMITIES:  Gait: favors left using crutches.  Alignment: valgus.  No gross " deformities or masses.  No Quad atrophy, strength normal.  Intact sensation deep peroneal nerve, superficial peroneal nerve, med/lat tibial nerve, sural nerve, saphenous nerve  Intact EHL, EDL, TA, FHL, GS, quadriceps hamstrings and hip flexors  Toes warm and well perfused, brisk capillary refill. Palpable 2+ dp pulses.  Bilateral calf soft and nttp or squeeze.  Edema: none    LEFT KNEE EXAM:     Skin: intact, no ecchymosis or erythema  ROM: 1 hyperextension to 95 flexion  Tight hamstrings on straight leg raise.  Effusion: small, but difficult to assess given habitus.  Tender: medial patellofemoral tenderness  McMurrays: negative.  lachmans difficult to assess given guarding, habitus.      RIGHT KNEE EXAM:    Skin: intact, no ecchymosis or erythema; prior surgical scars  ROM: 0 extension to 120 flexion  Tight hamstrings on straight leg raise.  Effusion: none  Tender: NTTP med/lat joint line, anterior or posterior knee  McMurrays: negative      X-RAY:  3 views left knee from 2/6/2018 were reviewed in clinic today. On my review, no obvious fractures or dislocations.        Impression:  17 year old female with left knee injury    Plan:   * Reviewed imaging with patient. Also, clinical exam findings.  * Discussed with patient that based on physical exam findings, it is not possible to completely rule out an ACL tear or medial meniscus tear or patella instability episode.  * An MRI of the left knee was ordered for further evaluation.     * Rest  * Activity modification - avoid activities that aggravate symptoms.  * NSAIDS - regular use for inflammation, with food, as long as no contra-indications. Please discuss with pcp if needed.  * Ice twice daily to three times daily.  * Compression wrap  * Elevation of extremity to reduce swelling  * Tylenol as needed for pain    * After having the MRI, I would like the patient to call me so that we can discuss the results as well as how to proceed.       The information in this  document, created by a scribe for me, accurately reflects the services I personally performed and the decisions made by me. I have reviewed and approved this document for accuracy.     Emile Menendez M.D., M.S.  Dept. of Orthopaedic Surgery  [facility], [place]

## 2018-02-07 NOTE — PATIENT INSTRUCTIONS
Please remember to call and schedule a follow up appointment if one was recommended at your earliest convenience.  Orthopedics CLINIC HOURS TELEPHONE NUMBER   Dr. Gemma Mariee  Certified Medical Assistant   Monday & Wednesday   8am - 5pm  Thursday 1pm - 5pm  Friday 8am -11:30am Specialty schedulers:   [phone] to make an appointment with any Specialty Provider.   Main Clinic:   [phone] to make an appointment with your primary provider   Urgent Care locations:    [facility] Monday-Friday Closed  Saturday-Sunday 9am-5pm      Monday-Friday 12pm - 8pm  Saturday-Sunday 9am-5pm [phone]     If SURGERY has been recommended, please call our Specialty Schedulers at [phone] to schedule your procedure.    If you need a medication refill, please contact your pharmacy. Please allow 3 business days for your refill to be completed.    If an MRI or CT scan has been recommended, please call [facility] Schedulers at [phone] to schedule your appointment.  Use [email or web address]t (secure e-mail communication and access to your chart) to send a message or to make an appointment. Please ask how you can sign up for [employer]  Your care team's suggested websites for health information:   [email or web address] : Up to date and easily searchable information on multiple topics.   [email or web address] : [place] dept of heat, public health issues in [place], N1N1

## 2018-02-07 NOTE — LETTER
2/7/2018         RE: Kat Rodriguez  [address] [place] [id number]        Dear Colleague,    Thank you for referring your patient, Kat Rodriguez, to the [facility]. Please see a copy of my visit note below.    chief complaint:   Chief Complaint   Patient presents with     Knee left     Left knee injury while practicing [facility] 1/18/2018 landed on left knee and heard a cracking . She states her pain improved without treatment so she returned to practice however, while at [facility] 2/5/2018 landed wrong and gradually getting worse. History of  ACL, MCL and meniscus tear in her right knee. Treatment includes ice, ibuprofen.        HISTORY OF PRESENT ILLNESS    Kat Rodriguez is a 17 year old female seen for evaluation of a left knee injury that occurred on 1/18/2018. The injury occurred while practicing [facility]. She landed onto her knee and heard a crack. She had immediate pain but eventually improved so she returned to practice. She had practice again on 2/5/2018 and landed wrong. Since then, her pain has gradually worsened. Today she has mild pain, rated a 3/10. Pain is located over the medial and lateral aspects of the knee. Also feels she has medial knee instability with walking. For treatment, she has been taking and ibuprofen.    Of note: she has a history of ACL, MCL and meniscus tear in her right knee. Treated with reconstructive surgery by me on 1/29/2015.    Present symptoms: mild knee pain, + swelling.    Symptoms occur with walking.    The frequency of symptoms frequently.  Pain severity: 3/10  Pain quality: dull and aching  Exacerbating Factors: weight bearing, stairs  Relieving Factors: rest  Night Pain: No  Pain while at rest: No   Patient has tried:     NSAIDS: Yes     Physical Therapy: No      Activity modification: Yes      Bracing: No      Injections: No     Ice: Yes      Other: crutches.      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    Orthopedic PMH: history of ACL, MCL and medial meniscus tear in the right knee, treated with reconstructive surgery.    Other PMH:  has a past medical history of Asthma.  Patient Active Problem List    Diagnosis Date Noted     Obesity, Class III, BMI 40-49.9 (morbid obesity) (H) 04/18/2016     Priority: Medium     S/P ACL reconstruction 02/04/2015     Priority: Medium     Medial meniscus tear 12/24/2014     Priority: Medium     Tear of lateral cartilage or meniscus of knee, current 12/24/2014     Priority: Medium     Sprain of MCL (medial collateral ligament) of knee 12/24/2014     Priority: Medium     Learning problem? 07/31/2013     Priority: Medium     Marked on form by mom, but in office visit denied learning problems.  Discuss at next office visit.        Irregular menses 07/31/2013     Priority: Medium     Future orders for labs for PCOS.         Vitamin D deficiency disease 07/31/2013     Priority: Medium     Obesity peds (BMI >=95 percentile) 06/29/2012     Priority: Medium     Seen by weight management (Dr. Boudreaux):  Per last note, 'not ready to make changes as of yet'.  July 2013- plans to see soon.  Labs ordered for future fasting order.       Mild persistent asthma 10/17/2011     Priority: Medium     Allergy to peanuts 06/19/2006     Priority: Medium     Has Epi Pen.         Surgical Hx:  has a past surgical history that includes no history of surgery and Arthroscopic reconstruction anterior cruciate ligament (Right, 1/29/2015).    Medications:   Current Outpatient Prescriptions:      naproxen (NAPROSYN) 500 MG tablet, Take 1 tablet (500 mg) by mouth 2 times daily (with meals) for 8 days, Disp: 16 tablet, Rfl: 0     order for DME, Equipment being ordered: L Knee sleeve, crutches, Disp: 1 each, Rfl: 0     ipratropium - albuterol 0.5 mg/2.5 mg/3 mL (DUONEB) 0.5-2.5 (3) MG/3ML neb solution, Take 1 vial (3 mLs) by nebulization once for 1 dose, Disp: 3 mL, Rfl: 0      "fluticasone (FLOVENT HFA) 110 MCG/ACT inhaler, Inhale 2 puffs into the lungs 2 times daily for asthma prevention., Disp: 1 Inhaler, Rfl: 2     albuterol (ALBUTEROL) 108 (90 BASE) MCG/ACT inhaler, Inhale 2 puffs into the lungs every 4 hours as needed for asthma symptoms., Disp: 2 Inhaler, Rfl: 1     EPINEPHrine (EPIPEN) 0.3 MG/0.3ML injection, Inject 0.3 mLs into the muscle once as needed for anaphylaxis., Disp: 2 each, Rfl: 3     Spacer/Aero Chamber Mouthpiece MISC, Use with albuterol and flovent, Disp: 2 each, Rfl: 0    Allergies:   Allergies   Allergen Reactions     Cats Itching     Fish      Nuts      Tree nuts, not peanuts     Trees Swelling       Social Hx:  reports that she is a non-smoker but has been exposed to tobacco smoke. She has never used smokeless tobacco. She reports that she does not drink alcohol or use illicit drugs.    Family Hx: family history includes Hyperlipidemia in her maternal grandmother; Hypertension in her maternal grandmother; Obesity in her maternal grandfather and mother.    REVIEW OF SYSTEMS: 10 point ROS neg other than the symptoms noted above in the HPI and PMH. Notables include  CONSTITUTIONAL:NEGATIVE for fever, chills, change in weight  INTEGUMENTARY/SKIN: NEGATIVE for worrisome rashes, moles or lesions  MUSCULOSKELETAL:See HPI above  NEURO: NEGATIVE for weakness, dizziness or paresthesias    This document serves as a record of the services and decisions personally performed and made by Emile Menendez MD. It was created on his behalf by Francia Gregory, a trained medical scribe. The creation of this document is based the provider's statements to the medical scribe.    Gloibprimo Gregory 3:40 PM 2/7/2018    PHYSICAL EXAM:  Resp 16  Ht 1.565 m (5' 1.6\")  Wt 109.8 kg (242 lb)  BMI 44.84 kg/m2   GENERAL APPEARANCE: healthy, alert, no distress; accompanied by her friend.  SKIN: no suspicious lesions or rashes  NEURO: Normal strength and tone, mentation intact and speech normal  PSYCH:  " mentation appears normal and affect normal, not anxious  RESPIRATORY: No increased work of breathing.    Hands: no clubbing, nail pitting or nodes.    BILATERAL LOWER EXTREMITIES:  Gait: favors left using crutches.  Alignment: valgus.  No gross deformities or masses.  No Quad atrophy, strength normal.  Intact sensation deep peroneal nerve, superficial peroneal nerve, med/lat tibial nerve, sural nerve, saphenous nerve  Intact EHL, EDL, TA, FHL, GS, quadriceps hamstrings and hip flexors  Toes warm and well perfused, brisk capillary refill. Palpable 2+ dp pulses.  Bilateral calf soft and nttp or squeeze.  Edema: none    LEFT KNEE EXAM:     Skin: intact, no ecchymosis or erythema  ROM: 1 hyperextension to 95 flexion  Tight hamstrings on straight leg raise.  Effusion: small, but difficult to assess given habitus.  Tender: medial patellofemoral tenderness  McMurrays: negative.  lachmans difficult to assess given guarding, habitus.      RIGHT KNEE EXAM:    Skin: intact, no ecchymosis or erythema; prior surgical scars  ROM: 0 extension to 120 flexion  Tight hamstrings on straight leg raise.  Effusion: none  Tender: NTTP med/lat joint line, anterior or posterior knee  McMurrays: negative      X-RAY:  3 views left knee from 2/6/2018 were reviewed in clinic today. On my review, no obvious fractures or dislocations.        Impression:  17 year old female with left knee injury    Plan:   * Reviewed imaging with patient. Also, clinical exam findings.  * Discussed with patient that based on physical exam findings, it is not possible to completely rule out an ACL tear or medial meniscus tear or patella instability episode.  * An MRI of the left knee was ordered for further evaluation.     * Rest  * Activity modification - avoid activities that aggravate symptoms.  * NSAIDS - regular use for inflammation, with food, as long as no contra-indications. Please discuss with pcp if needed.  * Ice twice daily to three times daily.  *  Compression wrap  * Elevation of extremity to reduce swelling  * Tylenol as needed for pain    * After having the MRI, I would like the patient to call me so that we can discuss the results as well as how to proceed.       The information in this document, created by a scribe for me, accurately reflects the services I personally performed and the decisions made by me. I have reviewed and approved this document for accuracy.     Emile Menendez M.D., M.S.  Dept. of Orthopaedic Surgery  [facility]      Again, thank you for allowing me to participate in the care of your patient.        Sincerely,        Emile Menendez MD

## 2018-02-07 NOTE — NURSING NOTE
"Chief Complaint   Patient presents with     Knee left     Left knee injury while practicing [employer] [facility] 1/18/2018 landed on left knee and heard a cracking . She states her pain improved without treatment so she returned to practice however, while at [facility] 2/5/2018 landed wrong and gradually getting worse. History of  ACL, MCL and meniscus tear in her right knee. Treatment includes ice, ibuprofen.        Initial Resp 16  Ht 1.565 m (5' 1.6\")  Wt 109.8 kg (242 lb)  BMI 44.84 kg/m2 Estimated body mass index is 44.84 kg/(m^2) as calculated from the following:    Height as of this encounter: 1.565 m (5' 1.6\").    Weight as of this encounter: 109.8 kg (242 lb).  Medication Reconciliation: complete   Roxi Uribe MA      "

## 2018-02-07 NOTE — LETTER
[facility]  [address] [facility] [address]  [phone]  WORKABILITY    [facility], Dr. Emile Menendez M.D.  Kanu WyocenaDanna        2/7/2018      RE: Kat Rodriguez    [address] [place] [id number]        To whom it may concern:     Kat Rodriguez is under my care for   1. Knee injury, left, initial encounter        Date of injury: 1/18/18, re-injury 2/5/18         Return to work date:     No return to work at this time. Ms. Rodriguez will be reassessed after having an upcoming MRI.     Next appointment: after MRI        Electronically Signed (as below)  Dr. Emile Menendez M.D.

## 2018-02-09 ENCOUNTER — RADIANT APPOINTMENT (OUTPATIENT)
Dept: MRI IMAGING | Facility: CLINIC | Age: 18
End: 2018-02-09
Attending: ORTHOPAEDIC SURGERY
Payer: COMMERCIAL

## 2018-02-09 DIAGNOSIS — S89.92XA KNEE INJURY, LEFT, INITIAL ENCOUNTER: ICD-10-CM

## 2018-02-09 PROCEDURE — 73721 MRI JNT OF LWR EXTRE W/O DYE: CPT | Mod: LT | Performed by: RADIOLOGY

## 2018-02-14 ENCOUNTER — OFFICE VISIT (OUTPATIENT)
Dept: ORTHOPEDICS | Facility: CLINIC | Age: 18
End: 2018-02-14
Payer: COMMERCIAL

## 2018-02-14 VITALS — BODY MASS INDEX: 44.35 KG/M2 | WEIGHT: 241 LBS | HEIGHT: 62 IN | RESPIRATION RATE: 16 BRPM

## 2018-02-14 DIAGNOSIS — S89.92XD ACUTE INJURY OF ANTERIOR CRUCIATE LIGAMENT, LEFT, SUBSEQUENT ENCOUNTER: Primary | ICD-10-CM

## 2018-02-14 PROCEDURE — 99213 OFFICE O/P EST LOW 20 MIN: CPT | Performed by: ORTHOPAEDIC SURGERY

## 2018-02-14 ASSESSMENT — PAIN SCALES - GENERAL: PAINLEVEL: NO PAIN (1)

## 2018-02-14 NOTE — MR AVS SNAPSHOT
After Visit Summary   2/14/2018    Kat Rodriguez    MRN: [id number]           Patient Information     Date Of Birth          2000        Visit Information        Provider Department      2/14/2018 1:30 PM Emile Menendez MD [facility]        Today's Diagnoses     Acute injury of anterior cruciate ligament, left, subsequent encounter    -  1      Care Instructions    Please remember to call and schedule a follow up appointment if one was recommended at your earliest convenience.  Orthopedics CLINIC HOURS TELEPHONE NUMBER   Dr. Gemma Mariee  Certified Medical Assistant   Monday & Wednesday   8am - 5pm  Thursday 1pm - 5pm  Friday 8am -11:30am Specialty schedulers:   [phone] to schedule your surgery.  Main Clinic:   [phone] to make an appointment with any provider.    Urgent Care locations:    [facility] Monday-Friday Closed  Saturday-Sunday 9am-5pm      Monday-Friday 12pm - 8pm  Saturday-Sunday 9am-5pm [phone]     If SURGERY has been recommended, please call our Specialty Schedulers at [phone] to schedule your procedure.    If you need a medication refill, please contact your pharmacy. Please allow 3 business days for your refill to be completed.    If an MRI or CT scan has been recommended, please call [facility] Schedulers at [phone] to schedule your appointment.  Use [employer] (secure e-mail communication and access to your chart) to send a message or to make an appointment. Please ask how you can sign up for Oriense.  Your care team's suggested websites for health information:   [email or web address] : Up to date and easily searchable information on multiple topics.   [email or web address] : [place] dept of heatl, public health issues in [place], N1N1              Follow-ups after your visit        Additional Services     [facility] PT, HAND, AND CHIROPRACTIC REFERRAL       === This order will  print in the [facility] Scheduling  Office ===    Physical therapy, hand therapy and chiropractic care are available through:    [facility]  [facility]    Call one easy number to schedule at any of the above locations:  [phone].    Your provider has referred you to Physical Therapy at [facility] or [facility]    Indication/Reason for Referral: left knee anterior cruciate ligament tear.  Onset of Illness:  1/2018  Therapy Orders:  Evaluate and Treat  Special Programs:  None  Special Request:  None    Additional Comments for the therapist or chiropractor:  None.    Please be aware that coverage of these services is subject to the terms and limitations of your health insurance plan.  Call member services at your health plan with any benefit or coverage questions.      Please bring the following to your appointment:    >>   Your personal calendar for scheduling future appointments  >>   Comfortable clothing                  Follow-up notes from your care team     Return in about 4 weeks (around 3/14/2018), or if symptoms worsen or fail to improve.      Who to contact     If you have questions or need follow up information about today's clinic visit or your schedule please contact [facility] directly at [phone].  Normal or non-critical lab and imaging results will be communicated to you by MyChart, letter or phone within 4 business days after the clinic has received the results. If you do not hear from us within 7 days, please contact the clinic through [employer] or phone. If you have a critical or abnormal lab result, we will notify you by phone as soon as possible.  Submit refill requests through [employer] or call your pharmacy and they will forward the refill request to us. Please allow 3 business days for your refill to be completed.          Additional Information About Your Visit        Intrusic Information     [employer] gives you secure access  "to your electronic health record. If you see a primary care provider, you can also send messages to your care team and make appointments. If you have questions, please call your primary care clinic.  If you do not have a primary care provider, please call [phone] and they will assist you.        Care EveryWhere ID     This is your Care EveryWhere ID. This could be used by other organizations to access your [facility] medical records  [id number]        Your Vitals Were     Respirations Height BMI (Body Mass Index)             16 5' 1.6\" (1.565 m) 44.65 kg/m2          Blood Pressure from Last 3 Encounters:   02/06/18 110/64   01/20/18 113/79   11/19/17 109/72    Weight from Last 3 Encounters:   02/14/18 241 lb (109.3 kg) (>99 %)*   02/07/18 242 lb (109.8 kg) (>99 %)*   01/20/18 242 lb 12.8 oz (110.1 kg) (>99 %)*     * Growth percentiles are based on [facility] 2-20 Years data.              We Performed the Following     [facility] PT, HAND, AND CHIROPRACTIC REFERRAL        Primary Care Provider    Martita Miramontes PA-C       No address on file        Equal Access to Services     JERROD ROWAN : Hadii shakeel kimbleo Sorosana, waaxda luqadaha, qaybta kaalmada adejulyyada, katelyn madden . So [facility] [place] [facility] [phone].    ATENCIÓN: Si habla español, tiene a guerrier disposición servicios gratuitos de asistencia lingüística. Llame al [phone].    We comply with applicable federal civil rights laws and [place] laws. We do not discriminate on the basis of race, color, national origin, age, disability, sex, sexual orientation, or gender identity.            Thank you!     Thank you for choosing [facility]  for your care. Our goal is always to provide you with excellent care. Hearing back from our patients is one way we can continue to improve our services. Please take a few minutes to complete the written survey that you may receive in the mail after your visit with us. Thank you!           "   Your Updated Medication List - Protect others around you: Learn how to safely use, store and throw away your medicines at www.disposemymeds.org.          This list is accurate as of 2/14/18  3:24 PM.  Always use your most recent med list.                   Brand Name Dispense Instructions for use Diagnosis    albuterol 108 (90 BASE) MCG/ACT Inhaler    PROAIR HFA    2 Inhaler    Inhale 2 puffs into the lungs every 4 hours as needed for asthma symptoms.    Mild persistent asthma without complication       EPINEPHrine 0.3 MG/0.3ML injection    EPIPEN    2 each    Inject 0.3 mLs into the muscle once as needed for anaphylaxis.    Allergy to peanuts       fluticasone 110 MCG/ACT Inhaler    FLOVENT HFA    1 Inhaler    Inhale 2 puffs into the lungs 2 times daily for asthma prevention.    Mild persistent asthma without complication       ipratropium - albuterol 0.5 mg/2.5 mg/3 mL 0.5-2.5 (3) MG/3ML neb solution    DUONEB    3 mL    Take 1 vial (3 mLs) by nebulization once for 1 dose    Acute bronchospasm       naproxen 500 MG tablet    NAPROSYN    16 tablet    Take 1 tablet (500 mg) by mouth 2 times daily (with meals) for 8 days        order for DME     1 each    Equipment being ordered: L Knee sleeve, crutches    Acute pain of left knee       Spacer/Aero Chamber Mouthpiece Misc     2 each    Use with albuterol and flovent    Mild persistent asthma

## 2018-02-14 NOTE — PROGRESS NOTES
"chief complaint:   Chief Complaint   Patient presents with     RECHECK     Left knee injury. DOI 1/18/18, 4 week s/p. Patient is here to get her MRI results. Patient states her knee is doing a lot better since her last visit. She will have some pain if she bends down, cam crosses her legs, or steps down on the stairs too hard.        HISTORY OF PRESENT ILLNESS    Kat Rodriguez is a 17 year old female seen for follow up evaluation of a left knee injury that occurred on 1/18/2018. The injury occurred while practicing [employer]. She landed onto her knee and heard a crack. She had immediate pain but eventually improved so she returned to practice. She had practice again on 2/5/2018 and landed wrong. Today she returns to discuss her MRI results as well as her options. Pain is located over the lateral apsect of the knee. This comes on with crossing the legs, stepping onto the leg too hard, bending the knee, or with extending the knee. Pain is much improved since prior visit, able to walk more \"normal\" now.    Of note: she has a history of ACL, MCL and meniscus tear in her right knee. Treated with reconstructive surgery by me on 1/29/2015.    Present symptoms: mild knee pain, + swelling.    Symptoms occur with walking.    The frequency of symptoms frequently.  Pain severity: 3/10  Pain quality: dull and aching  Exacerbating Factors: weight bearing, stairs  Relieving Factors: rest  Night Pain: No  Pain while at rest: No   Patient has tried:     NSAIDS: Yes     Physical Therapy: No      Activity modification: Yes      Bracing: No      Injections: No     Ice: Yes      Other: crutches.      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    Orthopedic PMH: history of ACL, MCL and medial meniscus tear in the right knee, treated with reconstructive surgery.    Other PMH:  has a past medical history of Asthma.  Patient Active Problem List    Diagnosis Date Noted     " Obesity, Class III, BMI 40-49.9 (morbid obesity) (H) 04/18/2016     Priority: Medium     S/P ACL reconstruction 02/04/2015     Priority: Medium     Medial meniscus tear 12/24/2014     Priority: Medium     Tear of lateral cartilage or meniscus of knee, current 12/24/2014     Priority: Medium     Sprain of MCL (medial collateral ligament) of knee 12/24/2014     Priority: Medium     Learning problem? 07/31/2013     Priority: Medium     Marked on form by mom, but in office visit denied learning problems.  Discuss at next office visit.        Irregular menses 07/31/2013     Priority: Medium     Future orders for labs for PCOS.         Vitamin D deficiency disease 07/31/2013     Priority: Medium     Obesity peds (BMI >=95 percentile) 06/29/2012     Priority: Medium     Seen by weight management (Dr. Boudreaux):  Per last note, 'not ready to make changes as of yet'.  July 2013- plans to see soon.  Labs ordered for future fasting order.       Mild persistent asthma 10/17/2011     Priority: Medium     Allergy to peanuts 06/19/2006     Priority: Medium     Has Epi Pen.         Surgical Hx:  has a past surgical history that includes no history of surgery and Arthroscopic reconstruction anterior cruciate ligament (Right, 1/29/2015).    Medications:   Current Outpatient Prescriptions:      naproxen (NAPROSYN) 500 MG tablet, Take 1 tablet (500 mg) by mouth 2 times daily (with meals) for 8 days, Disp: 16 tablet, Rfl: 0     order for DME, Equipment being ordered: L Knee sleeve, crutches, Disp: 1 each, Rfl: 0     ipratropium - albuterol 0.5 mg/2.5 mg/3 mL (DUONEB) 0.5-2.5 (3) MG/3ML neb solution, Take 1 vial (3 mLs) by nebulization once for 1 dose, Disp: 3 mL, Rfl: 0     fluticasone (FLOVENT HFA) 110 MCG/ACT inhaler, Inhale 2 puffs into the lungs 2 times daily for asthma prevention., Disp: 1 Inhaler, Rfl: 2     albuterol (ALBUTEROL) 108 (90 BASE) MCG/ACT inhaler, Inhale 2 puffs into the lungs every 4 hours as needed for asthma symptoms.,  "Disp: 2 Inhaler, Rfl: 1     EPINEPHrine (EPIPEN) 0.3 MG/0.3ML injection, Inject 0.3 mLs into the muscle once as needed for anaphylaxis., Disp: 2 each, Rfl: 3     Spacer/Aero Chamber Mouthpiece MISC, Use with albuterol and flovent, Disp: 2 each, Rfl: 0    Allergies:   Allergies   Allergen Reactions     Cats Itching     Fish      Nuts      Tree nuts, not peanuts     Trees Swelling       Social Hx:  reports that she is a non-smoker but has been exposed to tobacco smoke. She has never used smokeless tobacco. She reports that she does not drink alcohol or use illicit drugs.    Family Hx: family history includes Hyperlipidemia in her maternal grandmother; Hypertension in her maternal grandmother; Obesity in her maternal grandfather and mother.    REVIEW OF SYSTEMS:  CONSTITUTIONAL:NEGATIVE for fever, chills, change in weight  INTEGUMENTARY/SKIN: NEGATIVE for worrisome rashes, moles or lesions  MUSCULOSKELETAL:See HPI above  NEURO: NEGATIVE for weakness, dizziness or paresthesias    This document serves as a record of the services and decisions personally performed and made by Emile Menendze MD. It was created on his behalf by Francia Gregory, a trained medical scribe. The creation of this document is based the provider's statements to the medical scribe.    Scribe Francia Gregory 1:44 PM 2/14/2018     PHYSICAL EXAM:  Resp 16  Ht 1.565 m (5' 1.6\")  Wt 109.3 kg (241 lb)  BMI 44.65 kg/m2   GENERAL APPEARANCE: healthy, alert, no distress  SKIN: no suspicious lesions or rashes  NEURO: Normal strength and tone, mentation intact and speech normal  PSYCH:  mentation appears normal and affect normal, not anxious  RESPIRATORY: No increased work of breathing.    BILATERAL LOWER EXTREMITIES:  Gait: near normal gait.  Alignment: valgus.  No gross deformities or masses.  No Quad atrophy, strength normal.  Intact sensation deep peroneal nerve, superficial peroneal nerve, med/lat tibial nerve, sural nerve, saphenous nerve  Intact EHL, EDL, TA, " FHL, GS, quadriceps hamstrings and hip flexors  Toes warm and well perfused, brisk capillary refill. Palpable 2+ dp pulses.  Bilateral calf soft and nttp or squeeze.  Edema: none    LEFT KNEE EXAM:     Skin: intact, no ecchymosis or erythema  ROM: 0 extension to 110 flexion, anterior discomfort with extension  Tight hamstrings on straight leg raise.  Effusion: small, but difficult to assess given habitus.  Tender: medial patellofemoral tenderness  McMurrays: negative.  Lachmans difficult to assess , possibly grade 2.  Positive pivot glide      RIGHT KNEE EXAM:    Skin: intact, no ecchymosis or erythema; prior surgical scars  ROM: 0 extension to 120 flexion  Tight hamstrings on straight leg raise.  Effusion: none  Tender: NTTP med/lat joint line, anterior or posterior knee  McMurrays: negative      X-RAY:  no new x-rays.  3 views left knee from 2/6/2018 were reviewed in clinic today. On my review, no obvious fractures or dislocations.    MRI of the left knee taken on 2/9/2018:   Impression:  1. Complete tear of the anterior cruciate ligament at its  midsubstance.    a. No associated marrow contusion.  2. Low grade sprain of the tibial collateral and posterior oblique  ligaments with possible additional capsular injury in posteromedial  aspect.  3. Muscle strain of the plantaris muscle.      Impression:  17 year old female with left knee injury, anterior cruciate ligament tear.    Plan:   Discussed with patient her injury of anterior cruciate ligament disruption. anterior cruciate ligament provided stability of the knee by limiting anterior tibial translation. This is a common knee injury. Various treatment options exist, and an informed autonomous decision is made with respect to how to proceed with treatment. Consideration is made based on age, activity level, occupation and physical demands, as well as symptoms.    * there are not the typical bone contusions usually seen with acute injuries, which could be  aggravation of an underlying prior injury, perhaps. History is not clear.      * treatment options include nonoperative with Physical Therapy working on range of motion and strengthening, activity modification, and the use of an anterior cruciate ligament stabilizing brace. Surgical options include anterior cruciate ligament reconstruction with either autograft or allograft. Options with autograft include hamstring and bone-patella tendon-bone. Benefits and disadvantages or each were discussed. Long term studies and meta-analyses do not show a clear benefit with either technique, as both seem to provide good results. Additionally, allograft options were discussed, advantages and disadvantages, including risk of disease transmission.    * she had prior right knee anterior cruciate ligament surgery, so she remembers quite well.    * Perioperative and post-operative recovery and rehabilitation was discussed.  * risks of any surgery, include: bleeding, infection, pain, scar, damage to adjacent structures such as nerves, vessels and cartilage, temporary versus permanent nerve damage, implant failure, graft failure, recurrent instability, knee stiffness and post-traumatic arthritis, need for further surgery, blood clots, pulmonary embolus, risks of anesthesia and death.  * Understanding the options of treatment, as well as the risks and benefits of each, the patient would like to think about her options.    * Physical Therapy referral placed.  * return to clinic 4 weeks, sooner if needed.         The information in this document, created by a scribe for me, accurately reflects the services I personally performed and the decisions made by me. I have reviewed and approved this document for accuracy.     Emile Menendez M.D., M.S.  Dept. of Orthopaedic Surgery  [facility]

## 2018-02-14 NOTE — LETTER
"    2/14/2018         RE: Kat Rodriguez  [address] [place] [id number]        Dear Colleague,    Thank you for referring your patient, Kat Rodriguez, to the [facility]. Please see a copy of my visit note below.    chief complaint:   Chief Complaint   Patient presents with     RECHECK     Left knee injury. DOI 1/18/18, 4 week s/p. Patient is here to get her MRI results. Patient states her knee is doing a lot better since her last visit. She will have some pain if she bends down, cam crosses her legs, or steps down on the stairs too hard.        HISTORY OF PRESENT ILLNESS    Kat Rodriguez is a 17 year old female seen for follow up evaluation of a left knee injury that occurred on 1/18/2018. The injury occurred while practicing [employer]. She landed onto her knee and heard a crack. She had immediate pain but eventually improved so she returned to practice. She had practice again on 2/5/2018 and landed wrong. Today she returns to discuss her MRI results as well as her options. Pain is located over the lateral apsect of the knee. This comes on with crossing the legs, stepping onto the leg too hard, bending the knee, or with extending the knee. Pain is much improved since prior visit, able to walk more \"normal\" now.    Of note: she has a history of ACL, MCL and meniscus tear in her right knee. Treated with reconstructive surgery by me on 1/29/2015.    Present symptoms: mild knee pain, + swelling.    Symptoms occur with walking.    The frequency of symptoms frequently.  Pain severity: 3/10  Pain quality: dull and aching  Exacerbating Factors: weight bearing, stairs  Relieving Factors: rest  Night Pain: No  Pain while at rest: No   Patient has tried:     NSAIDS: Yes     Physical Therapy: No      Activity modification: Yes      Bracing: No      Injections: No     Ice: Yes      Other: crutches.      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    Orthopedic PMH: history of ACL, MCL and medial meniscus tear in the right knee, treated with reconstructive surgery.    Other PMH:  has a past medical history of Asthma.  Patient Active Problem List    Diagnosis Date Noted     Obesity, Class III, BMI 40-49.9 (morbid obesity) (H) 04/18/2016     Priority: Medium     S/P ACL reconstruction 02/04/2015     Priority: Medium     Medial meniscus tear 12/24/2014     Priority: Medium     Tear of lateral cartilage or meniscus of knee, current 12/24/2014     Priority: Medium     Sprain of MCL (medial collateral ligament) of knee 12/24/2014     Priority: Medium     Learning problem? 07/31/2013     Priority: Medium     Marked on form by mom, but in office visit denied learning problems.  Discuss at next office visit.        Irregular menses 07/31/2013     Priority: Medium     Future orders for labs for PCOS.         Vitamin D deficiency disease 07/31/2013     Priority: Medium     Obesity peds (BMI >=95 percentile) 06/29/2012     Priority: Medium     Seen by weight management (Dr. Boudreaux):  Per last note, 'not ready to make changes as of yet'.  July 2013- plans to see soon.  Labs ordered for future fasting order.       Mild persistent asthma 10/17/2011     Priority: Medium     Allergy to peanuts 06/19/2006     Priority: Medium     Has Epi Pen.         Surgical Hx:  has a past surgical history that includes no history of surgery and Arthroscopic reconstruction anterior cruciate ligament (Right, 1/29/2015).    Medications:   Current Outpatient Prescriptions:      naproxen (NAPROSYN) 500 MG tablet, Take 1 tablet (500 mg) by mouth 2 times daily (with meals) for 8 days, Disp: 16 tablet, Rfl: 0     order for DME, Equipment being ordered: L Knee sleeve, crutches, Disp: 1 each, Rfl: 0     ipratropium - albuterol 0.5 mg/2.5 mg/3 mL (DUONEB) 0.5-2.5 (3) MG/3ML neb solution, Take 1 vial (3 mLs) by nebulization once for 1 dose, Disp: 3 mL, Rfl: 0      "fluticasone (FLOVENT HFA) 110 MCG/ACT inhaler, Inhale 2 puffs into the lungs 2 times daily for asthma prevention., Disp: 1 Inhaler, Rfl: 2     albuterol (ALBUTEROL) 108 (90 BASE) MCG/ACT inhaler, Inhale 2 puffs into the lungs every 4 hours as needed for asthma symptoms., Disp: 2 Inhaler, Rfl: 1     EPINEPHrine (EPIPEN) 0.3 MG/0.3ML injection, Inject 0.3 mLs into the muscle once as needed for anaphylaxis., Disp: 2 each, Rfl: 3     Spacer/Aero Chamber Mouthpiece MISC, Use with albuterol and flovent, Disp: 2 each, Rfl: 0    Allergies:   Allergies   Allergen Reactions     Cats Itching     Fish      Nuts      Tree nuts, not peanuts     Trees Swelling       Social Hx:  reports that she is a non-smoker but has been exposed to tobacco smoke. She has never used smokeless tobacco. She reports that she does not drink alcohol or use illicit drugs.    Family Hx: family history includes Hyperlipidemia in her maternal grandmother; Hypertension in her maternal grandmother; Obesity in her maternal grandfather and mother.    REVIEW OF SYSTEMS:  CONSTITUTIONAL:NEGATIVE for fever, chills, change in weight  INTEGUMENTARY/SKIN: NEGATIVE for worrisome rashes, moles or lesions  MUSCULOSKELETAL:See HPI above  NEURO: NEGATIVE for weakness, dizziness or paresthesias    This document serves as a record of the services and decisions personally performed and made by Emile Menendez MD. It was created on his behalf by Francia Gregory, a trained medical scribe. The creation of this document is based the provider's statements to the medical scribe.    Scribe Francia Gregory 1:44 PM 2/14/2018     PHYSICAL EXAM:  Resp 16  Ht 1.565 m (5' 1.6\")  Wt 109.3 kg (241 lb)  BMI 44.65 kg/m2   GENERAL APPEARANCE: healthy, alert, no distress  SKIN: no suspicious lesions or rashes  NEURO: Normal strength and tone, mentation intact and speech normal  PSYCH:  mentation appears normal and affect normal, not anxious  RESPIRATORY: No increased work of breathing.    BILATERAL " LOWER EXTREMITIES:  Gait: near normal gait.  Alignment: valgus.  No gross deformities or masses.  No Quad atrophy, strength normal.  Intact sensation deep peroneal nerve, superficial peroneal nerve, med/lat tibial nerve, sural nerve, saphenous nerve  Intact EHL, EDL, TA, FHL, GS, quadriceps hamstrings and hip flexors  Toes warm and well perfused, brisk capillary refill. Palpable 2+ dp pulses.  Bilateral calf soft and nttp or squeeze.  Edema: none    LEFT KNEE EXAM:     Skin: intact, no ecchymosis or erythema  ROM: 0 extension to 110 flexion, anterior discomfort with extension  Tight hamstrings on straight leg raise.  Effusion: small, but difficult to assess given habitus.  Tender: medial patellofemoral tenderness  McMurrays: negative.  Lachmans difficult to assess , possibly grade 2.  Positive pivot glide      RIGHT KNEE EXAM:    Skin: intact, no ecchymosis or erythema; prior surgical scars  ROM: 0 extension to 120 flexion  Tight hamstrings on straight leg raise.  Effusion: none  Tender: NTTP med/lat joint line, anterior or posterior knee  McMurrays: negative      X-RAY:  no new x-rays.  3 views left knee from 2/6/2018 were reviewed in clinic today. On my review, no obvious fractures or dislocations.    MRI of the left knee taken on 2/9/2018:   Impression:  1. Complete tear of the anterior cruciate ligament at its  midsubstance.    a. No associated marrow contusion.  2. Low grade sprain of the tibial collateral and posterior oblique  ligaments with possible additional capsular injury in posteromedial  aspect.  3. Muscle strain of the plantaris muscle.      Impression:  17 year old female with left knee injury, anterior cruciate ligament tear.    Plan:   Discussed with patient her injury of anterior cruciate ligament disruption. anterior cruciate ligament provided stability of the knee by limiting anterior tibial translation. This is a common knee injury. Various treatment options exist, and an informed autonomous  decision is made with respect to how to proceed with treatment. Consideration is made based on age, activity level, occupation and physical demands, as well as symptoms.    * there are not the typical bone contusions usually seen with acute injuries, which could be aggravation of an underlying prior injury, perhaps. History is not clear.      * treatment options include nonoperative with Physical Therapy working on range of motion and strengthening, activity modification, and the use of an anterior cruciate ligament stabilizing brace. Surgical options include anterior cruciate ligament reconstruction with either autograft or allograft. Options with autograft include hamstring and bone-patella tendon-bone. Benefits and disadvantages or each were discussed. Long term studies and meta-analyses do not show a clear benefit with either technique, as both seem to provide good results. Additionally, allograft options were discussed, advantages and disadvantages, including risk of disease transmission.    * she had prior right knee anterior cruciate ligament surgery, so she remembers quite well.    * Perioperative and post-operative recovery and rehabilitation was discussed.  * risks of any surgery, include: bleeding, infection, pain, scar, damage to adjacent structures such as nerves, vessels and cartilage, temporary versus permanent nerve damage, implant failure, graft failure, recurrent instability, knee stiffness and post-traumatic arthritis, need for further surgery, blood clots, pulmonary embolus, risks of anesthesia and death.  * Understanding the options of treatment, as well as the risks and benefits of each, the patient would like to think about her options.    * Physical Therapy referral placed.  * return to clinic 4 weeks, sooner if needed.         The information in this document, created by a scribe for me, accurately reflects the services I personally performed and the decisions made by me. I have reviewed and  approved this document for accuracy.     Emile Menendez M.D., M.S.  Dept. of Orthopaedic Surgery  [facility]      Again, thank you for allowing me to participate in the care of your patient.        Sincerely,        Emile Menendez MD

## 2018-02-14 NOTE — PATIENT INSTRUCTIONS
Please remember to call and schedule a follow up appointment if one was recommended at your earliest convenience.  Orthopedics CLINIC HOURS TELEPHONE NUMBER   Dr. Gemma Mariee  Certified Medical Assistant   Monday & Wednesday   8am - 5pm  Thursday 1pm - 5pm  Friday 8am -11:30am Specialty schedulers:   [phone] to schedule your surgery.  Main Clinic:   [phone] to make an appointment with any provider.    Urgent Care locations:    [facility] Monday-Friday Closed  Saturday-Sunday 9am-5pm      Monday-Friday 12pm - 8pm  Saturday-Sunday 9am-5pm [phone]     If SURGERY has been recommended, please call our Specialty Schedulers at [phone] to schedule your procedure.    If you need a medication refill, please contact your pharmacy. Please allow 3 business days for your refill to be completed.    If an MRI or CT scan has been recommended, please call [facility] Schedulers at [phone] to schedule your appointment.  Use [employer] (secure e-mail communication and access to your chart) to send a message or to make an appointment. Please ask how you can sign up for [employer].  Your care team's suggested websites for health information:   [email or web address] : Up to date and easily searchable information on multiple topics.   [email or web address] : [place] dept of heat, public health issues in [place], N1N1

## 2018-02-15 ENCOUNTER — TELEPHONE (OUTPATIENT)
Dept: ORTHOPEDICS | Facility: CLINIC | Age: 18
End: 2018-02-15

## 2018-02-15 NOTE — TELEPHONE ENCOUNTER
Mother called back and also noted that patient is going to need a letter for her work noting restrictions. Thank you

## 2018-02-15 NOTE — TELEPHONE ENCOUNTER
Reason for Call:  Other call back    Detailed comments: Patients mother is calling to schedule surgery orders are needed. Thank you     Phone Number Patient can be reached at: Cell number on file:    Telephone Information:   Mobile [phone]       Best Time: any    Can we leave a detailed message on this number? YES    Call taken on 2/15/2018 at 8:21 AM by Gunjan Peres

## 2018-02-19 NOTE — TELEPHONE ENCOUNTER
Reason for Call:  Other call back    Detailed comments: Mother is calling to scheduled surgery for patient, orders are needed also mother is requesting a letter for patients employer noting restrictions patient has to stand all day. Please call and advise Thank  you    Phone Number Patient can be reached at: Other phone number:  [phone] mother Bruna     Best Time: any    Can we leave a detailed message on this number? YES    Call taken on 2/19/2018 at 10:54 AM by Gunjan Peres

## 2018-02-19 NOTE — TELEPHONE ENCOUNTER
Called and spoke to Kat regarding surgery, risks and perceived benefits again. We also discussed graft choices, advantages and disadvantages of hamstrings versus patella tendon versus allograft. At this time, given she had good results on her right knee surgery, she would like to use hamstrings again for her left knee surgery.    Plan: left knee anterior cruciate ligament reconstruction with hamstrings autograft, possible meniscus repair versus debridement, possible chondral debridement. Outpatient.    I gave her scheduling number to call.    She also stated that she did not need a note for work at this time as she spoke to her work this morning and they were fine at this time.    Emile Menendez M.D., M.S.  Dept. of Orthopaedic Surgery  [facility]

## 2018-03-12 ENCOUNTER — OFFICE VISIT (OUTPATIENT)
Dept: FAMILY MEDICINE | Facility: CLINIC | Age: 18
End: 2018-03-12
Payer: COMMERCIAL

## 2018-03-12 VITALS
RESPIRATION RATE: 20 BRPM | SYSTOLIC BLOOD PRESSURE: 107 MMHG | OXYGEN SATURATION: 97 % | DIASTOLIC BLOOD PRESSURE: 77 MMHG | HEIGHT: 62 IN | BODY MASS INDEX: 45.53 KG/M2 | HEART RATE: 97 BPM | TEMPERATURE: 98.4 F | WEIGHT: 247.4 LBS

## 2018-03-12 DIAGNOSIS — Z01.818 PREOP GENERAL PHYSICAL EXAM: Primary | ICD-10-CM

## 2018-03-12 DIAGNOSIS — E66.01 OBESITY, CLASS III, BMI 40-49.9 (MORBID OBESITY) (H): ICD-10-CM

## 2018-03-12 DIAGNOSIS — S83.512A ANTERIOR CRUCIATE LIGAMENT COMPLETE TEAR, LEFT, INITIAL ENCOUNTER: ICD-10-CM

## 2018-03-12 DIAGNOSIS — J45.30 MILD PERSISTENT ASTHMA WITHOUT COMPLICATION: ICD-10-CM

## 2018-03-12 DIAGNOSIS — N92.6 IRREGULAR MENSES: ICD-10-CM

## 2018-03-12 LAB — BETA HCG QUAL IFA URINE: NEGATIVE

## 2018-03-12 PROCEDURE — 99214 OFFICE O/P EST MOD 30 MIN: CPT | Performed by: FAMILY MEDICINE

## 2018-03-12 PROCEDURE — 84703 CHORIONIC GONADOTROPIN ASSAY: CPT | Performed by: FAMILY MEDICINE

## 2018-03-12 ASSESSMENT — PAIN SCALES - GENERAL: PAINLEVEL: NO PAIN (0)

## 2018-03-12 NOTE — PROGRESS NOTES
Ms. Rodriguez,    Your pregnancy test is normal.    Please contact the clinic if you have additional questions.  Thank you.    Sincerely,    Cathleen Linton

## 2018-03-12 NOTE — PROGRESS NOTES
[address] [address]  [phone]  Dept: [phone]    PRE-OP EVALUATION:  Today's date: 3/12/2018    Kat Rodriguez (: 2000) presents for pre-operative evaluation assessment as requested by Dr. Gemma ROLDAN MD.  She requires evaluation and anesthesia risk assessment prior to undergoing surgery/procedure for treatment of ARTHROSCOPY REPAIR ANTERIOR CRUCIATE LIGAMENT LEFT KNEE WITH HAMSTRING AUTOGRAFT POSSIBLE MENISCUS REPAIR VS DEBRIDEMENT .    Proposed Surgery/ Procedure: ARTHROSCOPY REPAIR ANTERIOR CRUCIATE LIGAMENT LEFT KNEE WITH HAMSTRING AUTOGRAFT POSSIBLE MENISCUS REPAIR VS DEBRIDEMENT  Date of Surgery/ Procedure: 3/20/18  Time of Surgery/ Procedure: 7 am  Hospital/Surgical Facility: [facility]  Fax number for surgical facility: [phone]  Primary Physician: Martita Miramontes  Type of Anesthesia Anticipated: unsure of type    Patient has a Health Care Directive or Living Will:  NO    1. NO - Do you have a history of heart attack, stroke, stent, bypass or surgery on an artery in the head, neck, heart or legs?  2. NO - Do you ever have any pain or discomfort in your chest?  3. NO - Do you have a history of  Heart Failure?  4. NO - Are you troubled by shortness of breath when: walking on the level, up a slight hill or at night?  5. NO - Do you currently have a cold, bronchitis or other respiratory infection?  6. NO - Do you have a cough, shortness of breath or wheezing?  7. NO - Do you sometimes get pains in the calves of your legs when you walk?  8. NO - Do you or anyone in your family have previous history of blood clots?  9. NO - Do you or does anyone in your family have a serious bleeding problem such as prolonged bleeding following surgeries or cuts?  10. NO - Have you ever had problems with anemia or been told to take iron pills?  11. NO - Have you had any abnormal blood loss such as black, tarry or bloody  stools, or abnormal vaginal bleeding?  12. NO - Have you ever had a blood transfusion?  13. NO - Have you or any of your relatives ever had problems with anesthesia?  14. NO - Do you have sleep apnea, excessive snoring or daytime drowsiness?  15. NO - Do you have any prosthetic heart valves?  16. NO - Do you have prosthetic joints?  17. NO - Is there any chance that you may be pregnant?      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  Had MRI which showed ACL tear and surgical intervention recommended.      ASTHMA - Patient has a longstanding history of moderate Asthma . Patient has been doing well overall noting no current symptoms and continues on medication regimen consisting of albuterol without adverse reactions or side effects.                                                                                                                                               .    MEDICAL HISTORY:     Patient Active Problem List    Diagnosis Date Noted     Obesity, Class III, BMI 40-49.9 (morbid obesity) (H) 04/18/2016     Priority: Medium     S/P ACL reconstruction 02/04/2015     Priority: Medium     Medial meniscus tear 12/24/2014     Priority: Medium     Tear of lateral cartilage or meniscus of knee, current 12/24/2014     Priority: Medium     Sprain of MCL (medial collateral ligament) of knee 12/24/2014     Priority: Medium     Learning problem? 07/31/2013     Priority: Medium     Marked on form by mom, but in office visit denied learning problems.  Discuss at next office visit.        Irregular menses 07/31/2013     Priority: Medium     Future orders for labs for PCOS.         Vitamin D deficiency disease 07/31/2013     Priority: Medium     Obesity peds (BMI >=95 percentile) 06/29/2012     Priority: Medium     Seen by weight management (Dr. Boudreaux):  Per last note, 'not ready to make changes as of yet'.  July 2013- plans to see soon.  Labs ordered for  "future fasting order.       Mild persistent asthma 10/17/2011     Priority: Medium     Allergy to peanuts 06/19/2006     Priority: Medium     Has Epi Pen.        Past Medical History:   Diagnosis Date     Asthma      Past Surgical History:   Procedure Laterality Date     ARTHROSCOPIC RECONSTRUCTION ANTERIOR CRUCIATE LIGAMENT Right 1/29/2015    Procedure: ARTHROSCOPIC RECONSTRUCTION ANTERIOR CRUCIATE LIGAMENT;  Surgeon: Emile Menendez MD;  Location: [facility] OR     Current Outpatient Prescriptions   Medication Sig Dispense Refill     albuterol (ALBUTEROL) 108 (90 BASE) MCG/ACT inhaler Inhale 2 puffs into the lungs every 4 hours as needed for asthma symptoms. 2 Inhaler 1     Spacer/Aero Chamber Mouthpiece MISC Use with albuterol and flovent 2 each 0     order for DME Equipment being ordered: L Knee sleeve, crutches (Patient not taking: Reported on 3/12/2018) 1 each 0     EPINEPHrine (EPIPEN) 0.3 MG/0.3ML injection Inject 0.3 mLs into the muscle once as needed for anaphylaxis. (Patient not taking: Reported on 3/12/2018) 2 each 3     OTC products: None, except as noted above    Allergies   Allergen Reactions     Cats Itching     Fish      Nuts      Tree nuts, not peanuts     Trees Swelling      Latex Allergy: NO    Social History   Substance Use Topics     Smoking status: Passive Smoke Exposure - Never Smoker     Smokeless tobacco: Never Used      Comment: father smokes     Alcohol use No     History   Drug Use No       REVIEW OF SYSTEMS:   Constitutional, neuro, ENT, endocrine, pulmonary, cardiac, gastrointestinal, genitourinary, musculoskeletal, integument and psychiatric systems are negative, except as otherwise noted.    EXAM:   /77 (BP Location: Left arm, Patient Position: Chair, Cuff Size: Adult Large)  Pulse 97  Temp 98.4  F (36.9  C) (Oral)  Resp 20  Ht 5' 1.6\" (1.565 m)  Wt 247 lb 6.4 oz (112.2 kg)  SpO2 97%  Breastfeeding? No  BMI 45.84 kg/m2    GENERAL APPEARANCE: healthy, alert and no distress, " obese     EYES: EOMI, PERRL     HENT: ear canals and TM's normal and nose and mouth without ulcers or lesions     NECK: no adenopathy, no asymmetry, masses, or scars and thyroid normal to palpation     RESP: lungs clear to auscultation - no rales, rhonchi or wheezes     CV: regular rates and rhythm, normal S1 S2, no S3 or S4 and no murmur, click or rub     ABDOMEN:  soft, nontender, no HSM or masses and bowel sounds normal     MS: extremities normal- no gross deformities noted, no evidence of inflammation in joints, FROM in all extremities.     SKIN: no suspicious lesions or rashes     NEURO: Normal strength and tone, sensory exam grossly normal, mentation intact and speech normal     PSYCH: mentation appears normal. and affect normal/bright     LYMPHATICS: No cervical adenopathy    DIAGNOSTICS:     Labs Drawn and in Process:   Unresulted Labs Ordered in the Past 30 Days of this Admission     No orders found from 1/11/2018 to 3/13/2018.          Recent Labs   Lab Test  09/23/17   0941  07/30/16   1036   06/29/12   1049   HGB  13.2   --    --    --    PLT  330   --    --    --    NA  141   --    --   140   POTASSIUM  4.2   --    --   4.1   CR  0.82   --    --   0.74*   A1C  5.5  5.5   < >  5.4    < > = values in this interval not displayed.        IMPRESSION:   Reason for surgery/procedure: LEFT ACL tear  Diagnosis/reason for consult: The primary encounter diagnosis was Preop general physical exam. Diagnoses of Anterior cruciate ligament complete tear, left, initial encounter, Obesity, Class III, BMI 40-49.9 (morbid obesity) (H), Mild persistent asthma without complication, and Irregular menses were also pertinent to this visit.     The proposed surgical procedure is considered INTERMEDIATE risk.    REVISED CARDIAC RISK INDEX  The patient has the following serious cardiovascular risks for perioperative complications such as (MI, PE, VFib and 3  AV Block):  No serious cardiac risks  INTERPRETATION: 0 risks: Class I  (very low risk - 0.4% complication rate)    The patient has the following additional risks for perioperative complications:  No identified additional risks      ICD-10-CM    1. Preop general physical exam Z01.818 Beta HCG qual IFA urine - [facility] and [facility]   2. Anterior cruciate ligament complete tear, left, initial encounter S83.512A    3. Obesity, Class III, BMI 40-49.9 (morbid obesity) (H) E66.01    4. Mild persistent asthma without complication J45.30    5. Irregular menses N92.6 Beta HCG qual IFA urine - [facility] and [facility]       RECOMMENDATIONS:       --Patient is to take all scheduled medications on the day of surgery EXCEPT for modifications listed below.    APPROVAL GIVEN to proceed with proposed procedure, without further diagnostic evaluation       Signed Electronically by: Cathleen Linton MD    Copy of this evaluation report is provided to requesting physician.    [facility] Preop Guidelines

## 2018-03-12 NOTE — MR AVS SNAPSHOT
After Visit Summary   3/12/2018    Kat Rodriguez    MRN: [id number]           Patient Information     Date Of Birth          2000        Visit Information        Provider Department      3/12/2018 10:00 AM Cathleen Davenport MD [facility]        Today's Diagnoses     Preop general physical exam    -  1    Anterior cruciate ligament complete tear, left, initial encounter        Obesity, Class III, BMI 40-49.9 (morbid obesity) (H)        Mild persistent asthma without complication        Irregular menses          Care Instructions      Before Your Surgery      Call your surgeon if there is any change in your health. This includes signs of a cold or flu (such as a sore throat, runny nose, cough, rash or fever).    Do not smoke, drink alcohol or take over the counter medicine (unless your surgeon or primary care doctor tells you to) for the 24 hours before and after surgery.    If you take prescribed drugs: Follow your doctor s orders about which medicines to take and which to stop until after surgery.    Eating and drinking prior to surgery: follow the instructions from your surgeon    Take a shower or bath the night before surgery. Use the soap your surgeon gave you to gently clean your skin. If you do not have soap from your surgeon, use your regular soap. Do not shave or scrub the surgery site.  Wear clean pajamas and have clean sheets on your bed.           Follow-ups after your visit        Your next 10 appointments already scheduled     Apr 04, 2018  4:15 PM CDT   Return Visit with Emile Menendez MD   [facility]    [address] [place] [id number] [phone]              Who to contact     If you have questions or need follow up information about today's clinic visit or your schedule please contact [facility] directly at [phone].  Normal or non-critical lab  "and imaging results will be communicated to you by MyChart, letter or phone within 4 business days after the clinic has received the results. If you do not hear from us within 7 days, please contact the clinic through [employer] or phone. If you have a critical or abnormal lab result, we will notify you by phone as soon as possible.  Submit refill requests through [employer] or call your pharmacy and they will forward the refill request to us. Please allow 3 business days for your refill to be completed.          Additional Information About Your Visit        [employer] Social Information     [employer] gives you secure access to your electronic health record. If you see a primary care provider, you can also send messages to your care team and make appointments. If you have questions, please call your primary care clinic.  If you do not have a primary care provider, please call [phone] and they will assist you.        Care EveryWhere ID     This is your Care EveryWhere ID. This could be used by other organizations to access your [place] medical records  [id number]        Your Vitals Were     Pulse Temperature Respirations Height Pulse Oximetry Breastfeeding?    97 98.4  F (36.9  C) (Oral) 20 5' 1.6\" (1.565 m) 97% No    BMI (Body Mass Index)                   45.84 kg/m2            Blood Pressure from Last 3 Encounters:   03/12/18 107/77   02/06/18 110/64   01/20/18 113/79    Weight from Last 3 Encounters:   03/12/18 247 lb 6.4 oz (112.2 kg) (>99 %)*   02/14/18 241 lb (109.3 kg) (>99 %)*   02/07/18 242 lb (109.8 kg) (>99 %)*     * Growth percentiles are based on CDC 2-20 Years data.              We Performed the Following     Beta HCG qual [facility] urine - [facility] and [facility] [place]        Primary Care Provider Office Phone # Fax #    [facility] [phone]       [id number]  [facility] [id number]        Equal Access to Services     NANCY ROWAN AH: Hadii joseph Whyteadaanjali, qaybta " katelyn gonzalezjuly madden ah. [facility] [phone].    ATENCIÓN: Si carrillo osborn, tiene a guerrier disposición servicios gratuitos de asistencia lingüística. Rachel al [phone].    We comply with applicable federal civil rights laws and [place] laws. We do not discriminate on the basis of race, color, national origin, age, disability, sex, sexual orientation, or gender identity.            Thank you!     Thank you for choosing [facility]  for your care. Our goal is always to provide you with excellent care. Hearing back from our patients is one way we can continue to improve our services. Please take a few minutes to complete the written survey that you may receive in the mail after your visit with us. Thank you!             Your Updated Medication List - Protect others around you: Learn how to safely use, store and throw away your medicines at www.disposemymeds.org.          This list is accurate as of 3/12/18 10:56 AM.  Always use your most recent med list.                   Brand Name Dispense Instructions for use Diagnosis    albuterol 108 (90 BASE) MCG/ACT Inhaler    PROAIR HFA    2 Inhaler    Inhale 2 puffs into the lungs every 4 hours as needed for asthma symptoms.    Mild persistent asthma without complication       EPINEPHrine 0.3 MG/0.3ML injection    EPIPEN    2 each    Inject 0.3 mLs into the muscle once as needed for anaphylaxis.    Allergy to peanuts       order for DME     1 each    Equipment being ordered: L Knee sleeve, crutches    Acute pain of left knee       Spacer/Aero Chamber Mouthpiece Misc     2 each    Use with albuterol and flovent    Mild persistent asthma

## 2018-03-13 ASSESSMENT — ASTHMA QUESTIONNAIRES: ACT_TOTALSCORE: 23

## 2018-03-20 ENCOUNTER — TRANSFERRED RECORDS (OUTPATIENT)
Dept: HEALTH INFORMATION MANAGEMENT | Facility: CLINIC | Age: 18
End: 2018-03-20

## 2018-03-21 ENCOUNTER — TELEPHONE (OUTPATIENT)
Dept: ORTHOPEDICS | Facility: CLINIC | Age: 18
End: 2018-03-21

## 2018-03-21 NOTE — TELEPHONE ENCOUNTER
Reason for Call:  Other prescription    Detailed comments: Pharmacy would like you to know that the pharmacy could only fill the percocet prescription for #45 and not #60. States insurance will only cover #45 at a time. Call if any questions. Thank you.    Phone Number Patient can be reached at: Other phone number:  [phone]    Best Time: any    Can we leave a detailed message on this number? YES    Call taken on 3/21/2018 at 1:54 PM by Malu Sanford

## 2018-04-04 ENCOUNTER — OFFICE VISIT (OUTPATIENT)
Dept: ORTHOPEDICS | Facility: CLINIC | Age: 18
End: 2018-04-04
Payer: COMMERCIAL

## 2018-04-04 VITALS
WEIGHT: 247 LBS | DIASTOLIC BLOOD PRESSURE: 76 MMHG | RESPIRATION RATE: 16 BRPM | SYSTOLIC BLOOD PRESSURE: 125 MMHG | HEIGHT: 62 IN | BODY MASS INDEX: 45.45 KG/M2

## 2018-04-04 DIAGNOSIS — Z98.890 S/P ACL RECONSTRUCTION: Primary | ICD-10-CM

## 2018-04-04 PROCEDURE — 99024 POSTOP FOLLOW-UP VISIT: CPT | Performed by: ORTHOPAEDIC SURGERY

## 2018-04-04 ASSESSMENT — PAIN SCALES - GENERAL: PAINLEVEL: NO PAIN (0)

## 2018-04-04 NOTE — LETTER
4/4/2018         RE: Kat Rodriguez  [address] [place] [id number]        Dear Colleague,    Thank you for referring your patient, Kat Rodriguez, to the [facility]. Please see a copy of my visit note below.    Chief Complaint   Patient presents with     Surgical Followup     Left ACL reconstruction with hamstring autograft. DOS: 3/20/18.          SURGERY: left knee anterior cruciate ligament reconstruction with hamstring autograft  DATE OF SURGERY: 3/20/2018.      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 Surgery occurred 2 weeks ago. Returns today stating she is doing well. Pain has been none, rated a 0/10. No problems with the surgical wounds. Denies fevers chills or night sweats. No associated numbness or tingling. Has been non-weightbearing using an immobilizer and using 2 crutches since surgery as recommended. Taking aspirin daily. Presents with her mom today.         Past Medical History:   Diagnosis Date     Asthma        Past Surgical History:   Procedure Laterality Date     ARTHROSCOPIC RECONSTRUCTION ANTERIOR CRUCIATE LIGAMENT Right 1/29/2015    Procedure: ARTHROSCOPIC RECONSTRUCTION ANTERIOR CRUCIATE LIGAMENT;  Surgeon: Emile Menendez MD;  Location:  OR     Patient Active Problem List    Diagnosis Date Noted     Obesity, Class III, BMI 40-49.9 (morbid obesity) (H) 04/18/2016     Priority: Medium     S/P ACL reconstruction 02/04/2015     Priority: Medium     Medial meniscus tear 12/24/2014     Priority: Medium     Tear of lateral cartilage or meniscus of knee, current 12/24/2014     Priority: Medium     Sprain of MCL (medial collateral ligament) of knee 12/24/2014     Priority: Medium     Learning problem? 07/31/2013     Priority: Medium     Marked  "on form by mom, but in office visit denied learning problems.  Discuss at next office visit.        Irregular menses 07/31/2013     Priority: Medium     Future orders for labs for PCOS.         Vitamin D deficiency disease 07/31/2013     Priority: Medium     Obesity peds (BMI >=95 percentile) 06/29/2012     Priority: Medium     Seen by weight management (Dr. Boudreaux):  Per last note, 'not ready to make changes as of yet'.  July 2013- plans to see soon.  Labs ordered for future fasting order.       Mild persistent asthma 10/17/2011     Priority: Medium     Allergy to peanuts 06/19/2006     Priority: Medium     Has Epi Pen.           Medications:   Current Outpatient Prescriptions:      order for DME, Equipment being ordered: L Knee sleeve, crutches (Patient not taking: Reported on 3/12/2018), Disp: 1 each, Rfl: 0     albuterol (ALBUTEROL) 108 (90 BASE) MCG/ACT inhaler, Inhale 2 puffs into the lungs every 4 hours as needed for asthma symptoms., Disp: 2 Inhaler, Rfl: 1     EPINEPHrine (EPIPEN) 0.3 MG/0.3ML injection, Inject 0.3 mLs into the muscle once as needed for anaphylaxis. (Patient not taking: Reported on 3/12/2018), Disp: 2 each, Rfl: 3     Spacer/Aero Chamber Mouthpiece MISC, Use with albuterol and flovent, Disp: 2 each, Rfl: 0    Allergies:   Allergies   Allergen Reactions     Cats Itching     Fish      Nuts      Tree nuts, not peanuts     Trees Swelling       REVIEW OF SYSTEMS:   CONSTITUTIONAL:NEGATIVE for fever, chills, night sweats  INTEGUMENTARY/SKIN: NEGATIVE for worrisome wound problems or redness.  MUSCULOSKELETAL:See HPI above  NEURO: NEGATIVE for weakness, dizziness or paresthesias    PHYSICAL EXAM:  /76 (BP Location: Left arm)  Resp 16  Ht 1.565 m (5' 1.6\")  Wt 112 kg (247 lb)  BMI 45.77 kg/m2   GENERAL APPEARANCE: healthy, alert, no distress; accompanied by her mother.  SKIN: no suspicious lesions or rashes  NEURO: Normal strength and tone, mentation intact and speech normal  PSYCH:  " mentation appears normal and affect normal, not anxious  RESPIRATORY: No increased work of breathing.    LEFT LOWER EXTREMITY:  Gait: non-weightbearing, using 2 crutches for support  mild Quad atrophy, strength weak  Intact sensation deep peroneal nerve, superficial peroneal nerve, med/lat tibial nerve, sural nerve, saphenous nerve  Intact EHL, EDL, TA, FHL, GS, quadriceps hamstrings and hip flexors  Toes warm and well perfused, brisk capillary refill. Palpable 2+ dp pulses.  calf soft and nttp or squeeze.  Edema: none      LEFT  KNEE EXAM:    Resolving ecchymosis.  Mild swelling.  Incisions healed. No erythema.  range of motion : not tested  Effusion: moderate  Tender: nontender to palpation           X-RAY: none indicated.        Impression: Kat Rodriguez is a 17 year old female 2 weeks status post left knee arthroscopy and anterior cruciate ligament reconstruction with hamstring autograft and hamstring autograft, doing well.     Plan: routine postoperative knee anterior cruciate ligament hamstring autograft reconstruction    * WB status: as tolerated in brace.  * Rest  * Activity modification - avoid activities that aggravate symptoms.  * NSAIDS - regular use for inflammation, with food, as long as no contra-indications. Please discuss with pcp if needed.  * Ice twice daily to three times daily.  * Compression wrap, new wrap given.  * Elevation of extremity to reduce swelling  * PT ordered for strengthening, stretching and range of motion exercises, effusion control; continue postoperative anterior cruciate ligament hamstring protocol  * wean to Tylenol as needed for pain  * return to clinic 4 weeks, sooner as needed.      The information in this document, created by a scribe for me, accurately reflects the services I personally performed and the decisions made by me. I have reviewed and approved this document for accuracy.        Emile Menendez M.D., M.S.  Dept. of Orthopaedic Surgery  [facility]  Services      Again, thank you for allowing me to participate in the care of your patient.        Sincerely,        Emile Menendez MD

## 2018-04-04 NOTE — PROGRESS NOTES
Chief Complaint   Patient presents with     Surgical Followup     Left ACL reconstruction with hamstring autograft. DOS: 3/20/18.          SURGERY: left knee anterior cruciate ligament reconstruction with hamstring autograft  DATE OF SURGERY: 3/20/2018.      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 Surgery occurred 2 weeks ago. Returns today stating she is doing well. Pain has been none, rated a 0/10. No problems with the surgical wounds. Denies fevers chills or night sweats. No associated numbness or tingling. Has been non-weightbearing using an immobilizer and using 2 crutches since surgery as recommended. Taking aspirin daily. Presents with her mom today.         Past Medical History:   Diagnosis Date     Asthma        Past Surgical History:   Procedure Laterality Date     ARTHROSCOPIC RECONSTRUCTION ANTERIOR CRUCIATE LIGAMENT Right 1/29/2015    Procedure: ARTHROSCOPIC RECONSTRUCTION ANTERIOR CRUCIATE LIGAMENT;  Surgeon: Emile Menendez MD;  Location: [facility] OR     Patient Active Problem List    Diagnosis Date Noted     Obesity, Class III, BMI 40-49.9 (morbid obesity) (H) 04/18/2016     Priority: Medium     S/P ACL reconstruction 02/04/2015     Priority: Medium     Medial meniscus tear 12/24/2014     Priority: Medium     Tear of lateral cartilage or meniscus of knee, current 12/24/2014     Priority: Medium     Sprain of MCL (medial collateral ligament) of knee 12/24/2014     Priority: Medium     Learning problem? 07/31/2013     Priority: Medium     Marked on form by mom, but in office visit denied learning problems.  Discuss at next office visit.        Irregular menses 07/31/2013     Priority: Medium     Future orders for labs for PCOS.         Vitamin D deficiency disease 07/31/2013     Priority: Medium      "Obesity peds (BMI >=95 percentile) 06/29/2012     Priority: Medium     Seen by weight management (Dr. Boudreaux):  Per last note, 'not ready to make changes as of yet'.  July 2013- plans to see soon.  Labs ordered for future fasting order.       Mild persistent asthma 10/17/2011     Priority: Medium     Allergy to peanuts 06/19/2006     Priority: Medium     Has Epi Pen.           Medications:   Current Outpatient Prescriptions:      order for DME, Equipment being ordered: L Knee sleeve, crutches (Patient not taking: Reported on 3/12/2018), Disp: 1 each, Rfl: 0     albuterol (ALBUTEROL) 108 (90 BASE) MCG/ACT inhaler, Inhale 2 puffs into the lungs every 4 hours as needed for asthma symptoms., Disp: 2 Inhaler, Rfl: 1     EPINEPHrine (EPIPEN) 0.3 MG/0.3ML injection, Inject 0.3 mLs into the muscle once as needed for anaphylaxis. (Patient not taking: Reported on 3/12/2018), Disp: 2 each, Rfl: 3     Spacer/Aero Chamber Mouthpiece MISC, Use with albuterol and flovent, Disp: 2 each, Rfl: 0    Allergies:   Allergies   Allergen Reactions     Cats Itching     Fish      Nuts      Tree nuts, not peanuts     Trees Swelling       REVIEW OF SYSTEMS:   CONSTITUTIONAL:NEGATIVE for fever, chills, night sweats  INTEGUMENTARY/SKIN: NEGATIVE for worrisome wound problems or redness.  MUSCULOSKELETAL:See HPI above  NEURO: NEGATIVE for weakness, dizziness or paresthesias    PHYSICAL EXAM:  /76 (BP Location: Left arm)  Resp 16  Ht 1.565 m (5' 1.6\")  Wt 112 kg (247 lb)  BMI 45.77 kg/m2   GENERAL APPEARANCE: healthy, alert, no distress; accompanied by her mother.  SKIN: no suspicious lesions or rashes  NEURO: Normal strength and tone, mentation intact and speech normal  PSYCH:  mentation appears normal and affect normal, not anxious  RESPIRATORY: No increased work of breathing.    LEFT LOWER EXTREMITY:  Gait: non-weightbearing, using 2 crutches for support  mild Quad atrophy, strength weak  Intact sensation deep peroneal nerve, superficial " peroneal nerve, med/lat tibial nerve, sural nerve, saphenous nerve  Intact EHL, EDL, TA, FHL, GS, quadriceps hamstrings and hip flexors  Toes warm and well perfused, brisk capillary refill. Palpable 2+ dp pulses.  calf soft and nttp or squeeze.  Edema: none      LEFT  KNEE EXAM:    Resolving ecchymosis.  Mild swelling.  Incisions healed. No erythema.  range of motion : not tested  Effusion: moderate  Tender: nontender to palpation           X-RAY: none indicated.        Impression: Kat Rodriguez is a 17 year old female 2 weeks status post left knee arthroscopy and anterior cruciate ligament reconstruction with hamstring autograft and hamstring autograft, doing well.     Plan: routine postoperative knee anterior cruciate ligament hamstring autograft reconstruction    * WB status: as tolerated in brace.  * Rest  * Activity modification - avoid activities that aggravate symptoms.  * NSAIDS - regular use for inflammation, with food, as long as no contra-indications. Please discuss with pcp if needed.  * Ice twice daily to three times daily.  * Compression wrap, new wrap given.  * Elevation of extremity to reduce swelling  * PT ordered for strengthening, stretching and range of motion exercises, effusion control; continue postoperative anterior cruciate ligament hamstring protocol  * wean to Tylenol as needed for pain  * return to clinic 4 weeks, sooner as needed.      The information in this document, created by a scribe for me, accurately reflects the services I personally performed and the decisions made by me. I have reviewed and approved this document for accuracy.        Emile Menendez M.D., M.S.  Dept. of Orthopaedic Surgery  [facility]

## 2018-04-17 ENCOUNTER — THERAPY VISIT (OUTPATIENT)
Dept: PHYSICAL THERAPY | Facility: CLINIC | Age: 18
End: 2018-04-17
Payer: COMMERCIAL

## 2018-04-17 DIAGNOSIS — Z98.890 S/P ACL RECONSTRUCTION: Primary | ICD-10-CM

## 2018-04-17 PROCEDURE — 97161 PT EVAL LOW COMPLEX 20 MIN: CPT | Mod: GP | Performed by: PHYSICAL THERAPIST

## 2018-04-17 PROCEDURE — 97110 THERAPEUTIC EXERCISES: CPT | Mod: GP | Performed by: PHYSICAL THERAPIST

## 2018-04-17 NOTE — PROGRESS NOTES
[facility] Initial Evaluation  Subjective:  Patient is a 17 year old female presenting with rehab left knee hpi.   Kat Rodriguez is a 17 year old female with a left knee condition.  Occurance: [employer].   Condition occurred: during recreation/sport.  This is a new condition  S/P LEFT ACL RECONSTRUCTION.      AMBULATION: CRUTCHES AND STRAIGHT LEG BRACE. .    Patient reports pain:  Medial, lateral and posterior.    Pain is described as aching and sharp and is intermittent and reported as 7/10.  Associated symptoms:  Buckling/giving out, locking, numbness, loss of motion/stiffness, loss of strength and edema. Pain is the same all the time.  Symptoms are exacerbated by standing, transfers and walking and relieved by rest, ice and NSAID's (PAIN MED. ).  Since onset symptoms are gradually improving.  Special tests:  MRI and x-ray.  Previous treatment includes surgery (R KNEE).    General health as reported by patient is fair.  Pertinent medical history includes:  Overweight and asthma.  Medical allergies: no.  Other surgeries include:  Orthopedic surgery (R KNEE).  Current medications:  Anti-inflammatory and pain medication.  Current occupation is STUDENT.  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    Barriers include:  Stairs.    Red flags:  None as reported by the patient.                        Objective:  System                                                Knee Evaluation:  ROM:      PROM    Hyperextension: Left: 7   Right:     Flexion: Left: 21   Right:       Strength:         Quad Set Left: Poor    Pain:           Edema:    Circumference:      Joint Line:  Left:  4/5               General     ROS    Assessment/Plan:    Patient is a 17 year old female with left side knee complaints.    Patient has the following significant findings with corresponding treatment plan.                Diagnosis 1:  S/P LEFT ACL  RECONSTRUCTION  Pain -  hot/cold therapy, electric stimulation, manual therapy, splint/taping/bracing/orthotics, self management, education, directional preference exercise and home program  Decreased ROM/flexibility - manual therapy, therapeutic exercise, therapeutic activity and home program  Decreased strength - therapeutic exercise, therapeutic activities and home program  Edema - cold therapy and self management/home program  Impaired gait - gait training, assistive devices and home program  Decreased function - therapeutic activities and home program    Therapy Evaluation Codes:   1) History comprised of:   Personal factors that impact the plan of care:      Past/current experiences.    Comorbidity factors that impact the plan of care are:      Overweight.     Medications impacting care: None.  2) Examination of Body Systems comprised of:   Body structures and functions that impact the plan of care:      Knee.   Activity limitations that impact the plan of care are:      Bathing, Bending, Cooking, Driving, Dressing, Lifting, Sitting, Squatting/kneeling, Stairs, Standing, Walking and Working.  3) Clinical presentation characteristics are:   Stable/Uncomplicated.  4) Decision-Making    Low complexity using standardized patient assessment instrument and/or measureable assessment of functional outcome.  Cumulative Therapy Evaluation is: Low complexity.    Previous and current functional limitations:  (See Goal Flow Sheet for this information)    Short term and Long term goals: (See Goal Flow Sheet for this information)     Communication ability:  Patient appears to be able to clearly communicate and understand verbal and written communication and follow directions correctly.  Treatment Explanation - The following has been discussed with the patient:   RX ordered/plan of care  Anticipated outcomes  Possible risks and side effects  This patient would benefit from PT intervention to resume normal activities.   Rehab  potential is good.    Frequency:  1-2 X week, once daily  Duration:  for 24 weeks  Discharge Plan:  Achieve all LTG.  Independent in home treatment program.  Reach maximal therapeutic benefit.    Please refer to the daily flowsheet for treatment today, total treatment time and time spent performing 1:1 timed codes.

## 2018-04-17 NOTE — MR AVS SNAPSHOT
After Visit Summary   4/17/2018    Kat Rodriguez    MRN: [id number]           Patient Information     Date Of Birth          2000        Visit Information        Provider Department      4/17/2018 2:40 PM Zeferino Gil, ESTEE [facility]        Today's Diagnoses     S/P ACL reconstruction    -  1       Follow-ups after your visit        Your next 10 appointments already scheduled     Apr 24, 2018  9:30 AM CDT   HERMIN[facility] Extremity with Zeferino Gil PT   [facility] (HER[facility] [place]  )    [address]  [place] [address]   [phone]            May 01, 2018 11:30 AM CDT   HERMINIO Extremity with Zeferino Gil PT   [facility] ([facility] [place]  )    [address]  [place] [address]   [phone]            May 02, 2018  1:00 PM CDT   [facility] Visit with Emile Menendez MD   [facility]    [address] [address]   [phone]              Who to contact     If you have questions or need follow up information about today's clinic visit or your schedule please contact [facility] directly at [phone].  Normal or non-critical lab and imaging results will be communicated to you by MyChart, letter or phone within 4 business days after the clinic has received the results. If you do not hear from us within 7 days, please contact the clinic through [employer] or phone. If you have a critical or abnormal lab result, we will notify you by phone as soon as possible.  Submit refill requests through [employer] or call your pharmacy and they will forward the refill request to us. Please allow 3 business days for your refill to be completed.          Additional Information About Your Visit        [employer] Information     [employer] gives you secure access to your electronic  health record. If you see a primary care provider, you can also send messages to your care team and make appointments. If you have questions, please call your primary care clinic.  If you do not have a primary care provider, please call [phone] and they will assist you.        Care EveryWhere ID     This is your Care EveryWhere ID. This could be used by other organizations to access your [facility] medical records  [id number]         Blood Pressure from Last 3 Encounters:   04/04/18 125/76   03/12/18 107/77   02/06/18 110/64    Weight from Last 3 Encounters:   04/04/18 112 kg (247 lb) (>99 %)*   03/12/18 112.2 kg (247 lb 6.4 oz) (>99 %)*   02/14/18 109.3 kg (241 lb) (>99 %)*     * Growth percentiles are based on [facility] 2-20 Years data.              We Performed the Following     HERMINIO Inital Eval Report     PT Eval, Low Complexity (30340)     Therapeutic Exercises        Primary Care Provider Office Phone # Fax #    [facility] [phone]       [address]  [facility] [address]        Equal Access to Services     NANCY ROWAN AH: Hadii aad ku lamino Sonegarali, waaxda luqadaha, qaybta kaalmada adeegyada, katelyn redmond. So [facility] [phone].    ATENCIÓN: Si habla español, tiene a guerrier disposición servicios gratuitos de asistencia lingüística. Llame al [phone].    We comply with applicable federal civil rights laws and [place] laws. We do not discriminate on the basis of race, color, national origin, age, disability, sex, sexual orientation, or gender identity.            Thank you!     Thank you for choosing [facility]  for your care. Our goal is always to provide you with excellent care. Hearing back from our patients is one way we can continue to improve our services. Please take a few minutes to complete the written survey that you may receive in the mail after your visit with us. Thank you!             Your  Updated Medication List - Protect others around you: Learn how to safely use, store and throw away your medicines at www.disposemymeds.org.          This list is accurate as of 4/17/18 11:59 PM.  Always use your most recent med list.                   Brand Name Dispense Instructions for use Diagnosis    albuterol 108 (90 Base) MCG/ACT Inhaler    PROAIR HFA    2 Inhaler    Inhale 2 puffs into the lungs every 4 hours as needed for asthma symptoms.    Mild persistent asthma without complication       EPINEPHrine 0.3 MG/0.3ML injection    EPIPEN    2 each    Inject 0.3 mLs into the muscle once as needed for anaphylaxis.    Allergy to peanuts       order for DME     1 each    Equipment being ordered: L Knee sleeve, crutches    Acute pain of left knee       Spacer/Aero Chamber Mouthpiece Misc     2 each    Use with albuterol and flovent    Mild persistent asthma

## 2018-04-19 ASSESSMENT — ACTIVITIES OF DAILY LIVING (ADL)
KNEE_ACTIVITY_OF_DAILY_LIVING_SUM: 19
RAW_SCORE: 19
GO DOWN STAIRS: ACTIVITY IS VERY DIFFICULT
SIT WITH YOUR KNEE BENT: I AM UNABLE TO DO THE ACTIVITY
AS_A_RESULT_OF_YOUR_KNEE_INJURY,_HOW_WOULD_YOU_RATE_YOUR_CURRENT_LEVEL_OF_DAILY_ACTIVITY?: SEVERELY ABNORMAL
RISE FROM A CHAIR: ACTIVITY IS FAIRLY DIFFICULT
WEAKNESS: THE SYMPTOM AFFECTS MY ACTIVITY SEVERELY
GO UP STAIRS: ACTIVITY IS VERY DIFFICULT
HOW_WOULD_YOU_RATE_THE_CURRENT_FUNCTION_OF_YOUR_KNEE_DURING_YOUR_USUAL_DAILY_ACTIVITIES_ON_A_SCALE_FROM_0_TO_100_WITH_100_BEING_YOUR_LEVEL_OF_KNEE_FUNCTION_PRIOR_TO_YOUR_INJURY_AND_0_BEING_THE_INABILITY_TO_PERFORM_ANY_OF_YOUR_USUAL_DAILY_ACTIVITIES?: 25
SQUAT: I AM UNABLE TO DO THE ACTIVITY
PAIN: THE SYMPTOM AFFECTS MY ACTIVITY MODERATELY
HOW_WOULD_YOU_RATE_THE_OVERALL_FUNCTION_OF_YOUR_KNEE_DURING_YOUR_USUAL_DAILY_ACTIVITIES?: ABNORMAL
LIMPING: THE SYMPTOM AFFECTS MY ACTIVITY SEVERELY
KNEEL ON THE FRONT OF YOUR KNEE: I AM UNABLE TO DO THE ACTIVITY
KNEE_ACTIVITY_OF_DAILY_LIVING_SCORE: 27.14
WALK: ACTIVITY IS VERY DIFFICULT
STIFFNESS: THE SYMPTOM AFFECTS MY ACTIVITY MODERATELY
SWELLING: I HAVE THE SYMPTOM BUT IT DOES NOT AFFECT MY ACTIVITY
GIVING WAY, BUCKLING OR SHIFTING OF KNEE: THE SYMPTOM AFFECTS MY ACTIVITY SEVERELY
STAND: ACTIVITY IS SOMEWHAT DIFFICULT

## 2018-05-01 ENCOUNTER — THERAPY VISIT (OUTPATIENT)
Dept: PHYSICAL THERAPY | Facility: CLINIC | Age: 18
End: 2018-05-01
Payer: COMMERCIAL

## 2018-05-01 DIAGNOSIS — Z98.890 S/P ACL RECONSTRUCTION: ICD-10-CM

## 2018-05-01 PROCEDURE — 97110 THERAPEUTIC EXERCISES: CPT | Mod: GP | Performed by: PHYSICAL THERAPIST

## 2018-05-01 PROCEDURE — 97530 THERAPEUTIC ACTIVITIES: CPT | Mod: GP | Performed by: PHYSICAL THERAPIST

## 2018-05-01 PROCEDURE — 97112 NEUROMUSCULAR REEDUCATION: CPT | Mod: GP | Performed by: PHYSICAL THERAPIST

## 2018-05-02 ENCOUNTER — OFFICE VISIT (OUTPATIENT)
Dept: ORTHOPEDICS | Facility: CLINIC | Age: 18
End: 2018-05-02
Payer: COMMERCIAL

## 2018-05-02 ENCOUNTER — RADIANT APPOINTMENT (OUTPATIENT)
Dept: GENERAL RADIOLOGY | Facility: CLINIC | Age: 18
End: 2018-05-02
Attending: ORTHOPAEDIC SURGERY
Payer: COMMERCIAL

## 2018-05-02 VITALS
BODY MASS INDEX: 44.42 KG/M2 | HEART RATE: 70 BPM | HEIGHT: 62 IN | SYSTOLIC BLOOD PRESSURE: 118 MMHG | DIASTOLIC BLOOD PRESSURE: 78 MMHG | WEIGHT: 241.4 LBS

## 2018-05-02 DIAGNOSIS — Z98.890 S/P ACL RECONSTRUCTION: ICD-10-CM

## 2018-05-02 DIAGNOSIS — Z98.890 S/P ACL RECONSTRUCTION: Primary | ICD-10-CM

## 2018-05-02 PROCEDURE — 73560 X-RAY EXAM OF KNEE 1 OR 2: CPT | Mod: LT

## 2018-05-02 PROCEDURE — 99024 POSTOP FOLLOW-UP VISIT: CPT | Performed by: ORTHOPAEDIC SURGERY

## 2018-05-02 ASSESSMENT — PAIN SCALES - GENERAL: PAINLEVEL: NO PAIN (0)

## 2018-05-02 NOTE — PROGRESS NOTES
Chief Complaint   Patient presents with     Surgical Followup     Left ACL reconstruction with HS autograft. DOS 3/20/18, 6 week PO. Patient states her knee is doing pretty good so far. She has some pain when she bends it certain ways. She continues to go to PT, going well, 1 time a week.      SURGERY: left knee anterior cruciate ligament reconstruction with hamstring autograft  DATE OF SURGERY: 3/20/2018.      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 Surgery occurred 6 weeks ago. She returns today doing well. She has been doing well with recovery. Occasional pain with bending it certain ways. Pain and stiffness with flexion over the anterior knee. Patient continues to go to physical therapy, 1x/week.       Past Medical History:   Diagnosis Date     Asthma        Past Surgical History:   Procedure Laterality Date     ARTHROSCOPIC RECONSTRUCTION ANTERIOR CRUCIATE LIGAMENT Right 1/29/2015    Procedure: ARTHROSCOPIC RECONSTRUCTION ANTERIOR CRUCIATE LIGAMENT;  Surgeon: Emile Menendez MD;  Location:  OR     Patient Active Problem List    Diagnosis Date Noted     Obesity, Class III, BMI 40-49.9 (morbid obesity) (H) 04/18/2016     Priority: Medium     S/P ACL reconstruction 02/04/2015     Priority: Medium     Medial meniscus tear 12/24/2014     Priority: Medium     Tear of lateral cartilage or meniscus of knee, current 12/24/2014     Priority: Medium     Sprain of MCL (medial collateral ligament) of knee 12/24/2014     Priority: Medium     Learning problem? 07/31/2013     Priority: Medium     Marked on form by mom, but in office visit denied learning problems.  Discuss at next office visit.        Irregular menses 07/31/2013     Priority: Medium     Future orders for labs for PCOS.         Vitamin D deficiency  "disease 07/31/2013     Priority: Medium     Obesity peds (BMI >=95 percentile) 06/29/2012     Priority: Medium     Seen by weight management (Dr. Boudreaux):  Per last note, 'not ready to make changes as of yet'.  July 2013- plans to see soon.  Labs ordered for future fasting order.       Mild persistent asthma 10/17/2011     Priority: Medium     Allergy to peanuts 06/19/2006     Priority: Medium     Has Epi Pen.           Medications:   Current Outpatient Prescriptions:      albuterol (ALBUTEROL) 108 (90 BASE) MCG/ACT inhaler, Inhale 2 puffs into the lungs every 4 hours as needed for asthma symptoms., Disp: 2 Inhaler, Rfl: 1     EPINEPHrine (EPIPEN) 0.3 MG/0.3ML injection, Inject 0.3 mLs into the muscle once as needed for anaphylaxis., Disp: 2 each, Rfl: 3     Spacer/Aero Chamber Mouthpiece MISC, Use with albuterol and flovent, Disp: 2 each, Rfl: 0     order for DME, Equipment being ordered: L Knee sleeve, crutches (Patient not taking: Reported on 3/12/2018), Disp: 1 each, Rfl: 0    Allergies:   Allergies   Allergen Reactions     Cats Itching     Fish      Nuts      Tree nuts, not peanuts     Trees Swelling       REVIEW OF SYSTEMS:   CONSTITUTIONAL:NEGATIVE for fever, chills, night sweats  INTEGUMENTARY/SKIN: NEGATIVE for worrisome wound problems or redness.  MUSCULOSKELETAL:See HPI above  NEURO: NEGATIVE for weakness, dizziness or paresthesias    This document serves as a record of the services and decisions personally performed and made by Emile Menendez MD. It was created on his behalf by Francia Gregory, a trained medical scribe. The creation of this document is based the provider's statements to the medical scribe.    Scribprimo Gregory 1:28 PM 5/2/2018       PHYSICAL EXAM:  /78  Pulse 70  Ht 1.562 m (5' 1.5\")  Wt 109.5 kg (241 lb 6.4 oz)  BMI 44.87 kg/m2   GENERAL APPEARANCE: healthy, alert, no distress  SKIN: no suspicious lesions or rashes  NEURO: Normal strength and tone, mentation intact and speech " normal  PSYCH:  mentation appears normal and affect normal, not anxious  RESPIRATORY: No increased work of breathing.    LEFT LOWER EXTREMITY:  Gait: slight favors the left.  mild Quad atrophy, strength weak  Intact sensation deep peroneal nerve, superficial peroneal nerve, med/lat tibial nerve, sural nerve, saphenous nerve  Intact EHL, EDL, TA, FHL, GS, quadriceps hamstrings and hip flexors  Toes warm and well perfused, brisk capillary refill. Palpable 2+ dp pulses.  calf soft and nttp or squeeze.  Edema: none      LEFT  KNEE EXAM:    Minimal swelling. No ecchymosis, no erythema.   Incisions healed. No erythema.  range of motion : 1 degrees hyperextension, 90 degrees flexion.  Effusion: trace  Tender: nontender to palpation           X-RAY: 2 views left knee 5/2/2018 reviewed. Post-surgical changes of anterior cruciate ligament surgery, lateral distal femoral metallic button noted.        Impression: Kat Rodriguez is a 17 year old female 6 weeks status post left knee arthroscopy and anterior cruciate ligament reconstruction with hamstring autograft and hamstring autograft, doing well.     Plan: routine postoperative knee anterior cruciate ligament hamstring autograft reconstruction    * reviewed xrays.  * would like range of motion, notably flexion further along. Need to work on that.  * WB status: as tolerated  * Rest  * Activity modification - avoid activities that aggravate symptoms. No running, jumping, cutting, pivoting, MMA  * NSAIDS - regular use for inflammation, with food, as long as no contra-indications. Please discuss with pcp if needed.  * Ice twice daily to three times daily.  * Elevation of extremity to reduce swelling  * PT updated for strengthening, stretching and range of motion exercises, effusion control; continue postoperative anterior cruciate ligament hamstring protocol  * wean to Tylenol as needed for pain  * return to clinic 6 weeks, sooner as needed.      The information in this document,  created by a scribe for me, accurately reflects the services I personally performed and the decisions made by me. I have reviewed and approved this document for accuracy.        Emile Menendez M.D., M.S.  Dept. of Orthopaedic Surgery  [facility]

## 2018-05-02 NOTE — LETTER
5/2/2018         RE: Kat Rodriguez  [address] [place] [id number]        Dear Colleague,    Thank you for referring your patient, Kat Rodriguez, to the [facility]. Please see a copy of my visit note below.    Chief Complaint   Patient presents with     Surgical Followup     Left ACL reconstruction with HS autograft. DOS 3/20/18, 6 week PO. Patient states her knee is doing pretty good so far. She has some pain when she bends it certain ways. She continues to go to PT, going well, 1 time a week.      SURGERY: left knee anterior cruciate ligament reconstruction with hamstring autograft  DATE OF SURGERY: 3/20/2018.      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 Surgery occurred 6 weeks ago. She returns today doing well. She has been doing well with recovery. Occasional pain with bending it certain ways. Pain and stiffness with flexion over the anterior knee. Patient continues to go to physical therapy, 1x/week.       Past Medical History:   Diagnosis Date     Asthma        Past Surgical History:   Procedure Laterality Date     ARTHROSCOPIC RECONSTRUCTION ANTERIOR CRUCIATE LIGAMENT Right 1/29/2015    Procedure: ARTHROSCOPIC RECONSTRUCTION ANTERIOR CRUCIATE LIGAMENT;  Surgeon: Emile Menendez MD;  Location:  OR     Patient Active Problem List    Diagnosis Date Noted     Obesity, Class III, BMI 40-49.9 (morbid obesity) (H) 04/18/2016     Priority: Medium     S/P ACL reconstruction 02/04/2015     Priority: Medium     Medial meniscus tear 12/24/2014     Priority: Medium     Tear of lateral cartilage or meniscus of knee, current 12/24/2014     Priority: Medium     Sprain of MCL (medial collateral ligament) of knee 12/24/2014     Priority: Medium     Learning problem?  07/31/2013     Priority: Medium     Marked on form by mom, but in office visit denied learning problems.  Discuss at next office visit.        Irregular menses 07/31/2013     Priority: Medium     Future orders for labs for PCOS.         Vitamin D deficiency disease 07/31/2013     Priority: Medium     Obesity peds (BMI >=95 percentile) 06/29/2012     Priority: Medium     Seen by weight management (Dr. Boudreaux):  Per last note, 'not ready to make changes as of yet'.  July 2013- plans to see soon.  Labs ordered for future fasting order.       Mild persistent asthma 10/17/2011     Priority: Medium     Allergy to peanuts 06/19/2006     Priority: Medium     Has Epi Pen.           Medications:   Current Outpatient Prescriptions:      albuterol (ALBUTEROL) 108 (90 BASE) MCG/ACT inhaler, Inhale 2 puffs into the lungs every 4 hours as needed for asthma symptoms., Disp: 2 Inhaler, Rfl: 1     EPINEPHrine (EPIPEN) 0.3 MG/0.3ML injection, Inject 0.3 mLs into the muscle once as needed for anaphylaxis., Disp: 2 each, Rfl: 3     Spacer/Aero Chamber Mouthpiece MISC, Use with albuterol and flovent, Disp: 2 each, Rfl: 0     order for DME, Equipment being ordered: L Knee sleeve, crutches (Patient not taking: Reported on 3/12/2018), Disp: 1 each, Rfl: 0    Allergies:   Allergies   Allergen Reactions     Cats Itching     Fish      Nuts      Tree nuts, not peanuts     Trees Swelling       REVIEW OF SYSTEMS:   CONSTITUTIONAL:NEGATIVE for fever, chills, night sweats  INTEGUMENTARY/SKIN: NEGATIVE for worrisome wound problems or redness.  MUSCULOSKELETAL:See HPI above  NEURO: NEGATIVE for weakness, dizziness or paresthesias    This document serves as a record of the services and decisions personally performed and made by Emile Menendez MD. It was created on his behalf by Francia Gregory, a trained medical scribe. The creation of this document is based the provider's statements to the medical scribe.    Robert Gregory 1:28 PM 5/2/2018       PHYSICAL  "EXAM:  /78  Pulse 70  Ht 1.562 m (5' 1.5\")  Wt 109.5 kg (241 lb 6.4 oz)  BMI 44.87 kg/m2   GENERAL APPEARANCE: healthy, alert, no distress  SKIN: no suspicious lesions or rashes  NEURO: Normal strength and tone, mentation intact and speech normal  PSYCH:  mentation appears normal and affect normal, not anxious  RESPIRATORY: No increased work of breathing.    LEFT LOWER EXTREMITY:  Gait: slight favors the left.  mild Quad atrophy, strength weak  Intact sensation deep peroneal nerve, superficial peroneal nerve, med/lat tibial nerve, sural nerve, saphenous nerve  Intact EHL, EDL, TA, FHL, GS, quadriceps hamstrings and hip flexors  Toes warm and well perfused, brisk capillary refill. Palpable 2+ dp pulses.  calf soft and nttp or squeeze.  Edema: none      LEFT  KNEE EXAM:    Minimal swelling. No ecchymosis, no erythema.   Incisions healed. No erythema.  range of motion : 1 degrees hyperextension, 90 degrees flexion.  Effusion: trace  Tender: nontender to palpation           X-RAY: 2 views left knee 5/2/2018 reviewed. Post-surgical changes of anterior cruciate ligament surgery, lateral distal femoral metallic button noted.        Impression: Kat Rodriguez is a 17 year old female 6 weeks status post left knee arthroscopy and anterior cruciate ligament reconstruction with hamstring autograft and hamstring autograft, doing well.     Plan: routine postoperative knee anterior cruciate ligament hamstring autograft reconstruction    * reviewed xrays.  * would like range of motion, notably flexion further along. Need to work on that.  * WB status: as tolerated  * Rest  * Activity modification - avoid activities that aggravate symptoms. No running, jumping, cutting, pivoting, MMA  * NSAIDS - regular use for inflammation, with food, as long as no contra-indications. Please discuss with pcp if needed.  * Ice twice daily to three times daily.  * Elevation of extremity to reduce swelling  * PT updated for strengthening, " stretching and range of motion exercises, effusion control; continue postoperative anterior cruciate ligament hamstring protocol  * wean to Tylenol as needed for pain  * return to clinic 6 weeks, sooner as needed.      The information in this document, created by a scribe for me, accurately reflects the services I personally performed and the decisions made by me. I have reviewed and approved this document for accuracy.        Emile Menendez M.D., M.S.  Dept. of Orthopaedic Surgery  [facility]      Again, thank you for allowing me to participate in the care of your patient.        Sincerely,        Emile Menendez MD

## 2018-05-02 NOTE — MR AVS SNAPSHOT
After Visit Summary   5/2/2018    Kat Rodriguez    MRN: [id number]           Patient Information     Date Of Birth          2000        Visit Information        Provider Department      5/2/2018 1:00 PM Emile Menendez MD [facility]        Today's Diagnoses     S/P ACL reconstruction    -  1       Follow-ups after your visit        Additional Services     [facility] PT, HAND, AND CHIROPRACTIC REFERRAL       **This order will print in the [facility] Scheduling Office**    Physical Therapy, Hand Therapy and Chiropractic Care are available through:    *[facility]  *[facility]  *[facility]    Call one number to schedule at any of the above locations: [phone].    Your provider has referred you to: Physical Therapy at [facility] or [facility]    Indication/Reason for Referral: s/p left ACL reconstruction with Hamstring autograft  Onset of Illness: 3/20/18  Therapy Orders: Per Protocol or Clinical Pathway  Special Programs: None  Special Request: None    Prema Lloyd      Additional Comments for the Therapist or Chiropractor:     Please be aware that coverage of these services is subject to the terms and limitations of your health insurance plan.  Call member services at your health plan with any benefit or coverage questions.      Please bring the following to your appointment:    *Your personal calendar for scheduling future appointments  *Comfortable clothing                  Follow-up notes from your care team     Return in about 6 weeks (around 6/13/2018) for clinical recheck.      Who to contact     If you have questions or need follow up information about today's clinic visit or your schedule please contact [facility] directly at [phone].  Normal or non-critical lab and imaging results will be communicated to you by MyChart, letter or phone within 4 business days after the clinic has received the results. If you  "do not hear from us within 7 days, please contact the clinic through [employer] or phone. If you have a critical or abnormal lab result, we will notify you by phone as soon as possible.  Submit refill requests through [employer] or call your pharmacy and they will forward the refill request to us. Please allow 3 business days for your refill to be completed.          Additional Information About Your Visit        [employer] Information     [employer] gives you secure access to your electronic health record. If you see a primary care provider, you can also send messages to your care team and make appointments. If you have questions, please call your primary care clinic.  If you do not have a primary care provider, please call [phone] and they will assist you.        Care EveryWhere ID     This is your Care EveryWhere ID. This could be used by other organizations to access your [place] medical records  [id number]        Your Vitals Were     Pulse Height BMI (Body Mass Index)             70 5' 1.5\" (1.562 m) 44.87 kg/m2          Blood Pressure from Last 3 Encounters:   05/02/18 118/78   04/04/18 125/76   03/12/18 107/77    Weight from Last 3 Encounters:   05/02/18 241 lb 6.4 oz (109.5 kg) (>99 %)*   04/04/18 247 lb (112 kg) (>99 %)*   03/12/18 247 lb 6.4 oz (112.2 kg) (>99 %)*     * Growth percentiles are based on CDC 2-20 Years data.              We Performed the Following     [facility] PT, HAND, AND CHIROPRACTIC REFERRAL        Primary Care Provider Office Phone # Fax #    [facility] [phone]       [address] [place]  [facility] [address]        Equal Access to Services     [facility] HARPAL AH: Hadii shakeel Maza, wamargada luqadaha, qaybta kaalmada senthilyajason, katelyn redmond. So [facility] [phone].    ATENCIÓN: Si habla español, tiene a guerrier disposición servicios gratuitos de asistencia lingüística. Llame al [phone].    We comply with applicable federal civil " rights laws and [place] laws. We do not discriminate on the basis of race, color, national origin, age, disability, sex, sexual orientation, or gender identity.            Thank you!     Thank you for choosing [facility]  for your care. Our goal is always to provide you with excellent care. Hearing back from our patients is one way we can continue to improve our services. Please take a few minutes to complete the written survey that you may receive in the mail after your visit with us. Thank you!             Your Updated Medication List - Protect others around you: Learn how to safely use, store and throw away your medicines at www.disposemymeds.org.          This list is accurate as of 5/2/18  2:49 PM.  Always use your most recent med list.                   Brand Name Dispense Instructions for use Diagnosis    albuterol 108 (90 Base) MCG/ACT Inhaler    PROAIR HFA    2 Inhaler    Inhale 2 puffs into the lungs every 4 hours as needed for asthma symptoms.    Mild persistent asthma without complication       EPINEPHrine 0.3 MG/0.3ML injection    EPIPEN    2 each    Inject 0.3 mLs into the muscle once as needed for anaphylaxis.    Allergy to peanuts       order for DME     1 each    Equipment being ordered: L Knee sleeve, crutches    Acute pain of left knee       Spacer/Aero Chamber Mouthpiece Misc     2 each    Use with albuterol and flovent    Mild persistent asthma

## 2018-05-17 ENCOUNTER — THERAPY VISIT (OUTPATIENT)
Dept: PHYSICAL THERAPY | Facility: CLINIC | Age: 18
End: 2018-05-17
Payer: COMMERCIAL

## 2018-05-17 DIAGNOSIS — Z98.890 S/P ACL RECONSTRUCTION: ICD-10-CM

## 2018-05-17 PROCEDURE — 97112 NEUROMUSCULAR REEDUCATION: CPT | Mod: GP | Performed by: PHYSICAL THERAPIST

## 2018-05-17 PROCEDURE — 97530 THERAPEUTIC ACTIVITIES: CPT | Mod: GP | Performed by: PHYSICAL THERAPIST

## 2018-05-17 PROCEDURE — 97110 THERAPEUTIC EXERCISES: CPT | Mod: GP | Performed by: PHYSICAL THERAPIST

## 2018-05-18 ENCOUNTER — OFFICE VISIT (OUTPATIENT)
Dept: SURGERY | Facility: CLINIC | Age: 18
End: 2018-05-18
Payer: COMMERCIAL

## 2018-05-18 VITALS
HEIGHT: 62 IN | HEART RATE: 102 BPM | SYSTOLIC BLOOD PRESSURE: 107 MMHG | WEIGHT: 242.4 LBS | BODY MASS INDEX: 44.61 KG/M2 | RESPIRATION RATE: 16 BRPM | DIASTOLIC BLOOD PRESSURE: 62 MMHG

## 2018-05-18 VITALS — WEIGHT: 242.4 LBS | HEIGHT: 62 IN | BODY MASS INDEX: 44.61 KG/M2

## 2018-05-18 DIAGNOSIS — E66.01 MORBID OBESITY WITH BMI OF 45.0-49.9, ADULT (H): ICD-10-CM

## 2018-05-18 DIAGNOSIS — G47.9 SLEEP DISTURBANCE: Primary | ICD-10-CM

## 2018-05-18 DIAGNOSIS — E55.9 VITAMIN D DEFICIENCY DISEASE: ICD-10-CM

## 2018-05-18 PROCEDURE — 97803 MED NUTRITION INDIV SUBSEQ: CPT | Performed by: DIETITIAN, REGISTERED

## 2018-05-18 PROCEDURE — 99214 OFFICE O/P EST MOD 30 MIN: CPT | Performed by: PHYSICIAN ASSISTANT

## 2018-05-18 NOTE — LETTER
aKt Rodriguez  May 18, 2018        Bariatric Task List          Lose 5 lbs prior to surgery.  Goal Weight: 237 lbs  Have preoperative laboratory tests drawn. - Repeat vitamin D  Psychological Evaluation with MMPI and clearance for weight loss surgery.  Achieve clearance from dietitian to see surgeon.  Bariatric Support Group  Sleep study

## 2018-05-18 NOTE — PROGRESS NOTES
"New Bariatric Surgery Consultation Note     RE: Kat Rodriguez  MR#: [id number]  : 2000        Referring provider: Martita Miramontes PA-C:      Chief Complaint/Reason for visit: evaluation for possible weight loss surgery     Dear Martita Miramontes PA-C:     I had the pleasure of seeing your patient, Kat Rodriguez, to evaluate her obesity and consider her for possible weight loss surgery.  She initially started the program 2017.   After having a few visits she decided to try and lose weight on her own.  Went to a gym and ended up tearing ACL requiring surgery. Had this surgery 3/2018.  Currently still in PT.  As you know, Kat Rodriguez is 17 year old.  She has a height of 5' 1.5\", a weight of 242 lbs 6.4 oz, and calculated Body mass index is 45.06 kg/(m^2).        HISTORY OF PRESENT ILLNESS:  Weight Loss History Reviewed with Patient 2017   How long have you been overweight? Since puberty   What is the most that you have ever weighed? 240   What is the most weight you have lost? 30   I have tried the following methods to lose weight Watching portions or calories, Exercise, Atkins type diet (low carb/high protein)   Have you ever had weight loss surgery? No      No medications for weigh loss.     CO-MORBIDITIES OF OBESITY INCLUDE:     2017   I have the following co-morbidities associated with obesity: Pre-Diabetes, Asthma, Weight Bearing Joint Pain     Reviewed A1C from 2017 it was 5.5.  Does not use daily asthma medication.       PAST MEDICAL HISTORY:   Past Medical History         Past Medical History:   Diagnosis Date     Asthma              PAST SURGICAL HISTORY: No previous bleeding, anesthesia, or nausea concerns with surgery.      Past Surgical History          Past Surgical History:   Procedure Laterality Date     ARTHROSCOPIC RECONSTRUCTION ANTERIOR CRUCIATE LIGAMENT Right 2015     Procedure: ARTHROSCOPIC RECONSTRUCTION ANTERIOR CRUCIATE LIGAMENT;  Surgeon: Emile Menendez " MD Cezar;  Location: [facility] OR     NO HISTORY OF SURGERY                FAMILY HISTORY:    Family History          Family History   Problem Relation Age of Onset     Obesity Mother       Hypertension Maternal Grandmother       Hyperlipidemia Maternal Grandmother       Obesity Maternal Grandfather              SOCIAL HISTORY: Is in college at [employer] studying nursing.    Social History Questions Reviewed With Patient 9/19/2017   Which best describes your employment status (select all that apply) I work part-time, I am a student   If you work, what is your occupation?    Which best describes your marital status: single   Do you have children? No   Who do you have in your support network that can be available to help you for the first 2 weeks after surgery? Parents   Who can you count on for support throughout your weight loss surgery journey? parents   Can you afford 3 meals a day?  Yes   Can you afford 50-60 dollars a month for vitamins? Yes         HABITS:     9/19/2017   How often do you drink alcohol? Never   Do you currently use any of the following Nicotine products? No   Have you ever used any of the following nicotine products? No   Have you or are you currently using street drugs or prescription strength medication for which you do not have a prescription for? No   Do you have a history of chemical dependency (alcohol or drug abuse)? No         PSYCHOLOGICAL HISTORY:   Psychological History Reviewed With Patient 9/19/2017   Have you ever attempted suicide? Never.   Have you had thoughts of suicide in the past year? No   Have you ever been hospitalized for mental illness or a suicide attempt? Never.   Do you have a history of chronic pain? No   Have you ever been diagnosed with fibromyalgia? No   Are you currently being treated for any of the following? (select all that apply) I do not have a mental illness         ROS:     9/19/2017   Skin:  None of the above   HEENT: None of these  "  Musculoskeletal: Joint Pain, Back pain   Cardiovascular: Shortness of breath with activity   Pulmonary: Shortness of breath with activity, Snoring   Gastrointestinal: None of the above   Genitourinary: None of the above   Hematological: None of the above   Neurological: None of the above   Female only: Loss of menstrual cycles   Last menstrual period was 4 years ago.       EATING BEHAVIORS:     9/19/2017   Have you or anyone else thought that you had an eating disorder? No   Do you currently binge eat (eat a large amount of food in a short time)? Yes   Are you an emotional eater? Yes   Do you get up to eat after falling asleep? No         EXERCISE:     9/19/2017   How often do you exercise? Never   What keeps you from being more active?  Pain, I have just had surgery on one or more of my joints, I should be more active but I just have not gotten around to it, Shortness of breath, Lack of Time, Too tired         MEDICATIONS:      Current Outpatient Prescriptions   Medication     fluticasone (FLOVENT HFA) 110 MCG/ACT inhaler     Spacer/Aero Chamber Mouthpiece MISC     albuterol (ALBUTEROL) 108 (90 BASE) MCG/ACT inhaler     EPINEPHrine (EPIPEN) 0.3 MG/0.3ML injection      No current facility-administered medications for this visit.          ALLERGIES:        Allergies   Allergen Reactions     Cats Itching     Fish       Nuts         Tree nuts, not peanuts     Trees Swelling         PHYSICAL EXAM:    /62  Pulse 102  Resp 16  Ht 5' 1.5\" (1.562 m)  Wt 242 lb 6.4 oz (110 kg)  BMI 45.06 kg/m2      GENERAL:  Good development and normal affect in no acute distress. Patient is alert and orientated x 3 and answers all questions appropriately.  HEENT: Head is normocephalic, nontraumatic. Pupils equal and round without conjunctival injection. Neck is supple without lymphadenopathy, thyroidmegaly, or mass. Trachea midline. Dentition WNL.   CARDIOVASCULAR:  Regular rate and rhythm without murmurs, rubs, or " gallops.  RESPIRATORY: Lungs are clear to auscultation bilaterally with good breath sounds.  GASTROINTESTINAL: Abdomen is obese, nondistended, soft, nontender, without organomegaly or masses. No bruits.  LOWER EXTREMITIES: No LE edema bilaterally, no cyanosis, ulceration, or chronic venous stasis noted.  MUSCULOSKELETAL:  Moves all 4 extremities equal and strong. Has a normal gait.   NEUROLOGIC: Cranial nerves II-XII grossly intact.  SKIN: No intertriginous irritation or rash.      In summary, Kat Rodriguez has Class III obesity with a body mass index of Body mass index is 45.06 kg/(m^2). kg/m2 and the comorbidities stated above. She completed an informational seminar and is a candidate for the laparoscopic gastric sleeve.  She will have to complete the following pre-requisites:     Received weight loss goal of 5 lb prior to surgery.  Achieve clearance from dietitian to see surgeon.  Have preoperative laboratory tests drawn. - Reviewed from 9/18.  Repeat Vit D  Psychological Evaluation with MMPI and clearance for weight loss surgery. - Had Initial with Dr Bryson.  Was set up to start with Kehinde before she took a pause from the program.  Attend a bariatric support group  Sleep study     Today in the office we discussed gastric sleeve surgery. Preoperative, perioperative, and postoperative processes, management, and follow up were addressed.  Risks and benefits were outlined including the risk of death, staple line leak (1-2%), PE, DVT, ulcer, worsening GERD, N/V, stricture, hernia, wound infection, weight regain, and vitamin deficiencies. I emphasized exercise and activity along with appropriate food choice as the main foundation for weight loss with surgery providing surgical reinforcement of this.  All questions were answered. A goal sheet and support group handout were given to the patient.     Once the patient has completed the requirements in their task list and there are no further recommendations, the  pt will be allowed to see the surgeon of her choice for consultation on the laparoscopic gastric sleeve surgery. Patient verbalizes understanding of the process to surgery and expectations for the postoperative period including the need for lifelong lifestyle changes, vitamin supplementation, and laboratory monitoring.       Sincerely,     Sarina Diego MS,  RADHA     I spent a total of 30 minutes face to face with Kat during today's office visit. Over 50% of this time was spent counseling the patient and/or coordinating care.

## 2018-05-18 NOTE — MR AVS SNAPSHOT
MRN:[id number]                      After Visit Summary   5/18/2018    Kat Rodriguez    MRN: [id number]           Visit Information        Provider Department      5/18/2018 1:30 PM 3, Rhina Yip, RD [facility]      Your next 10 appointments already scheduled     Jun 18, 2018  1:30 PM CDT   Weight Loss Visit with Rhina Rutherford Diet 2, RD   [facility] ([facility])    [address] [place] [id number] [phone]              MyChart Information     [employer] gives you secure access to your electronic health record. If you see a primary care provider, you can also send messages to your care team and make appointments. If you have questions, please call your primary care clinic.  If you do not have a primary care provider, please call [phone] and they will assist you.        Care EveryWhere ID     This is your Care EveryWhere ID. This could be used by other organizations to access your [place] medical records  [id number]        Equal Access to Services     NANCY ROWAN : Hadii shakeel bryant Sorosana, waaxda luqadaha, qaybta kaalmajason adejah, katelyn redmond. So [facility] [phone].    ATENCIÓN: Si habla español, tiene a guerrier disposición servicios gratuitos de asistencia lingüística. Llame al [phone].    We comply with applicable federal civil rights laws and [place] laws. We do not discriminate on the basis of race, color, national origin, age, disability, sex, sexual orientation, or gender identity.

## 2018-05-18 NOTE — PROGRESS NOTES
"PRE SURGICAL WEIGHT LOSS NUTRITION APPOINTMENT    Kat Rodriguez  2000  female  [id number]  17 year old    ASSESSMENT    Desired Surgical Procedure: gastric sleeve    REASON FOR VISIT:  Kat Rodriguez is a 17 year old year old female presents today for a pre-surgical weight loss follow-up appointment. Patient accompanied by self.    DIAGNOSIS:  Weight Status Obesity Grade III BMI >40    ANTHROPOMETRICS:  Height: 156.2 cm (5' 1.5\")  Initial Weight: 240 lbs    Weight last visit:  242.4 lbs  Current weight: 110 kg (242 lb 6.4 oz) RESTART WEIGHT  BMI: 45.15 kg/(m^2).    MULTIVITAMINS/MINERALS  None    NUTRITION HISTORY:  Breakfast: (skips)  Lunch: pizza, steak, chicken   Supper: (same as lunch)  Snacks: granola bars   Fluids Consumed: Water, Snapple, Powerade, Gatorade  Eating slower: No  Chewing foods thoroughly: No  Take 20-30 minutes to consume each meal: No  Fluids and meals separate by at least 30 minutes: No  Carbonation: none  Caffeine: none  Additional Information: Pt restarting program today. Goal weight 237.4 lbs. Reviewed dietary lifestyle guidelines and provided pt with new copy of handbook.     PHYSICAL ACTIVITY:  None/physical therapy only (had knee surgery 2 months ago)    DIAGNOSIS:  Previous Nutrition Diagnosis: Obesity related to long history of self- monitoring deficit and excessive energy intake evidenced by BMI of 43.4 kg/m2  No change, modified below    Previous goals:   Split calcium dose in 2 - not met  Keep fluids and meals  by 30 minutes - not met  Increase exercise to 4 X per week for 30 minutes - not met  Consistently chew all food well and take at least 20 minutes for each meal - not met    Current Nutrition Diagnosis: Obesity related to long history of self-monitoring deficit and excessive energy intake as evidenced by BMI of 45.06 kg/m2.    INTERVENTION:  Nutrition Prescription: Recommended energy/nutrient modification.    GOALS:  Begin taking multivitamin, calcium and " vitamin D per guidelines  Eat 3 meals/day - don't skip breakfast  Follow myplate method for meal structure   Replace caloric beverages with enhanced water or no-farzaneh substitute (examples provided)    Follow-Up:   Recommend monthly  follow up visits to assist with lifestyle changes or per insurance.    Intervention:  - Discussed progress towards previous goals.  - Reinforced importance of making behavior changes in preparation for bariatric surgery.   - Assessed learning needs and learning preferences       NUTRITION MONITORING AND EVALUATION:  Anticipated compliance: fair-good  Patient demonstrated fair-good understanding.     Follow up: Continue to monitor patient closely regarding weight loss and diet.  # of visits needed: 2-3  Cleared by RD: No     TIME SPENT WITH PATIENT: 25 minutes    Lizeth Baxter RD, LD  Clinical Dietitian

## 2018-05-18 NOTE — MR AVS SNAPSHOT
After Visit Summary   5/18/2018    Kat Rodriguez    MRN: [id number]           Patient Information     Date Of Birth          2000        Visit Information        Provider Department      5/18/2018 1:00 PM Sarina Diego PA-C [facility]      Today's Diagnoses     Sleep disturbance    -  1    Morbid obesity with BMI of 45.0-49.9, adult (H)        Vitamin D deficiency disease          Care Instructions    Plan:    Start working on task list items.  Schedule with diet for next month          Follow-ups after your visit        Additional Services     SLEEP EVALUATION & MANAGEMENT REFERRAL - ADULT -[facility]  [phone] (Age 15 and up)       Bariatric Fast Track  BMI:  Symptoms:  obesity, witnessed apnea, snoring, CLARK, nocturnal dyspnea, AM headaches, Elevated BP, HTN, Diabetes  SpO2:   CMP is pending in EPIC                  Your next 10 appointments already scheduled     Jun 18, 2018  1:30 PM CDT   Weight Loss Visit with  Wl Diet 2, RD   [facility] ([facility])    [address] [place] [id number] [phone]              Future tests that were ordered for you today     Open Future Orders        Priority Expected Expires Ordered    SLEEP EVALUATION & MANAGEMENT REF[place] [facility] [place]  [phone] (Age 15 and up) Routine  5/18/2019 5/18/2018            Who to contact     If you have questions or need follow up information about today's clinic visit or your schedule please contact [facility] directly at [phone].  Normal or non-critical lab and imaging results will be communicated to you by MyChart, letter or phone within 4 business days after the clinic has received the results. If you do not hear from us within 7 days, please  "contact the clinic through [employer] or phone. If you have a critical or abnormal lab result, we will notify you by phone as soon as possible.  Submit refill requests through [employer] or call your pharmacy and they will forward the refill request to us. Please allow 3 business days for your refill to be completed.          Additional Information About Your Visit        [employer] Information     [employer] gives you secure access to your electronic health record. If you see a primary care provider, you can also send messages to your care team and make appointments. If you have questions, please call your primary care clinic.  If you do not have a primary care provider, please call [phone] and they will assist you.        Care EveryWhere ID     This is your Care EveryWhere ID. This could be used by other organizations to access your [place] medical records  [id number]        Your Vitals Were     Pulse Respirations Height BMI (Body Mass Index)          102 16 5' 1.5\" (1.562 m) 45.06 kg/m2         Blood Pressure from Last 3 Encounters:   05/18/18 107/62   05/02/18 118/78   04/04/18 125/76    Weight from Last 3 Encounters:   05/18/18 242 lb 6.4 oz (110 kg) (>99 %)*   05/18/18 242 lb 6.4 oz (110 kg) (>99 %)*   05/02/18 241 lb 6.4 oz (109.5 kg) (>99 %)*     * Growth percentiles are based on CDC 2-20 Years data.                 Today's Medication Changes          These changes are accurate as of 5/18/18  2:18 PM.  If you have any questions, ask your nurse or doctor.               Stop taking these medicines if you haven't already. Please contact your care team if you have questions.     order for DME   Stopped by:  Sarina Diego PA-C                    Primary Care Provider Office Phone # Fax #    Cathleen Lore Linton [phone]       [address]  [facility] [address]        Equal Access to Services     NANCY ROWAN AH: Hadii shakeel bryant Sorosana, joseph burtonadaanjali, qaybta " katelyn gonzalezjuly madden ah. [facility] [phone].    ATENCIÓN: Si carrillo osborn, tiene a guerrier disposición servicios gratuitos de asistencia lingüística. Rachel al [phone].    We comply with applicable federal civil rights laws and [place] laws. We do not discriminate on the basis of race, color, national origin, age, disability, sex, sexual orientation, or gender identity.            Thank you!     Thank you for choosing [facility]  for your care. Our goal is always to provide you with excellent care. Hearing back from our patients is one way we can continue to improve our services. Please take a few minutes to complete the written survey that you may receive in the mail after your visit with us. Thank you!             Your Updated Medication List - Protect others around you: Learn how to safely use, store and throw away your medicines at www.disposemymeds.org.          This list is accurate as of 5/18/18  2:18 PM.  Always use your most recent med list.                   Brand Name Dispense Instructions for use Diagnosis    albuterol 108 (90 Base) MCG/ACT Inhaler    PROAIR HFA    2 Inhaler    Inhale 2 puffs into the lungs every 4 hours as needed for asthma symptoms.    Mild persistent asthma without complication       EPINEPHrine 0.3 MG/0.3ML injection    EPIPEN    2 each    Inject 0.3 mLs into the muscle once as needed for anaphylaxis.    Allergy to peanuts       Spacer/Aero Chamber Mouthpiece Misc     2 each    Use with albuterol and flovent    Mild persistent asthma

## 2018-06-04 ENCOUNTER — THERAPY VISIT (OUTPATIENT)
Dept: PHYSICAL THERAPY | Facility: CLINIC | Age: 18
End: 2018-06-04
Payer: COMMERCIAL

## 2018-06-04 DIAGNOSIS — Z98.890 S/P ACL RECONSTRUCTION: ICD-10-CM

## 2018-06-04 PROCEDURE — 97112 NEUROMUSCULAR REEDUCATION: CPT | Mod: GP | Performed by: PHYSICAL THERAPIST

## 2018-06-04 PROCEDURE — 97110 THERAPEUTIC EXERCISES: CPT | Mod: GP | Performed by: PHYSICAL THERAPIST

## 2018-06-04 PROCEDURE — 97530 THERAPEUTIC ACTIVITIES: CPT | Mod: GP | Performed by: PHYSICAL THERAPIST

## 2018-06-14 ENCOUNTER — THERAPY VISIT (OUTPATIENT)
Dept: PHYSICAL THERAPY | Facility: CLINIC | Age: 18
End: 2018-06-14
Payer: COMMERCIAL

## 2018-06-14 DIAGNOSIS — Z98.890 S/P ACL RECONSTRUCTION: ICD-10-CM

## 2018-06-14 PROCEDURE — 97110 THERAPEUTIC EXERCISES: CPT | Mod: GP | Performed by: PHYSICAL THERAPIST

## 2018-06-14 PROCEDURE — 97530 THERAPEUTIC ACTIVITIES: CPT | Mod: GP | Performed by: PHYSICAL THERAPIST

## 2018-06-14 PROCEDURE — 97112 NEUROMUSCULAR REEDUCATION: CPT | Mod: GP | Performed by: PHYSICAL THERAPIST

## 2018-06-20 ENCOUNTER — OFFICE VISIT (OUTPATIENT)
Dept: ORTHOPEDICS | Facility: CLINIC | Age: 18
End: 2018-06-20
Payer: COMMERCIAL

## 2018-06-20 VITALS
DIASTOLIC BLOOD PRESSURE: 68 MMHG | SYSTOLIC BLOOD PRESSURE: 99 MMHG | HEIGHT: 62 IN | BODY MASS INDEX: 43.94 KG/M2 | WEIGHT: 238.8 LBS

## 2018-06-20 DIAGNOSIS — Z98.890 S/P ACL RECONSTRUCTION: Primary | ICD-10-CM

## 2018-06-20 PROCEDURE — 99024 POSTOP FOLLOW-UP VISIT: CPT | Performed by: ORTHOPAEDIC SURGERY

## 2018-06-20 ASSESSMENT — PAIN SCALES - GENERAL: PAINLEVEL: NO PAIN (0)

## 2018-06-20 NOTE — MR AVS SNAPSHOT
After Visit Summary   6/20/2018    Kat Rodriguez    MRN: [id number]           Patient Information     Date Of Birth          2000        Visit Information        Provider Department      6/20/2018 1:45 PM Emile Menendez MD [facility]        Today's Diagnoses     S/P ACL reconstruction    -  1       Follow-ups after your visit        Follow-up notes from your care team     Return in about 2 months (around 8/20/2018) for clinical recheck.      Your next 10 appointments already scheduled     Jun 25, 2018  2:20 PM CDT   [facility] Extremity with Zeferino Gil, ESTEE   [facility] [place] ([facility] [place]  )    [address]  [facility] [address] [phone]              Who to contact     If you have questions or need follow up information about today's clinic visit or your schedule please contact [facility] directly at [phone].  Normal or non-critical lab and imaging results will be communicated to you by [employer], letter or phone within 4 business days after the clinic has received the results. If you do not hear from us within 7 days, please contact the clinic through [employer] or phone. If you have a critical or abnormal lab result, we will notify you by phone as soon as possible.  Submit refill requests through [employer] or call your pharmacy and they will forward the refill request to us. Please allow 3 business days for your refill to be completed.          Additional Information About Your Visit        MyChart Information     [employer] gives you secure access to your electronic health record. If you see a primary care provider, you can also send messages to your care team and make appointments. If you have questions, please call your primary care clinic.  If you do not have a primary care provider, please call [phone] and they will assist you.        Care EveryWhere ID     This is your Care EveryWhere ID. This  "could be used by other organizations to access your [place] medical records  [id number]        Your Vitals Were     Height BMI (Body Mass Index)                5' 1.5\" (1.562 m) 44.39 kg/m2           Blood Pressure from Last 3 Encounters:   06/20/18 99/68   05/18/18 107/62   05/02/18 118/78    Weight from Last 3 Encounters:   06/20/18 238 lb 12.8 oz (108.3 kg) (>99 %)*   05/18/18 242 lb 6.4 oz (110 kg) (>99 %)*   05/18/18 242 lb 6.4 oz (110 kg) (>99 %)*     * Growth percentiles are based on CDC 2-20 Years data.              Today, you had the following     No orders found for display       Primary Care Provider Office Phone # Fax #    Cathleen Lore Linton [phone] [address]  [facility] [address]        Equal Access to Services     : Hadii aad ku hadasho Soomaali, waaxda luqadaha, qaybta kaalmada adeegyada, waxay idiin haykamran madden . So [facility] [phone].    ATENCIÓN: Si habla español, tiene a guerrier disposición servicios gratuitos de asistencia lingüística. Ll[facility]eterans Health Administration [phone].    We comply with applicable federal civil rights laws and [place] laws. We do not discriminate on the basis of race, color, national origin, age, disability, sex, sexual orientation, or gender identity.            Thank you!     Thank you for choosing [facility]  for your care. Our goal is always to provide you with excellent care. Hearing back from our patients is one way we can continue to improve our services. Please take a few minutes to complete the written survey that you may receive in the mail after your visit with us. Thank you!             Your Updated Medication List - Protect others around you: Learn how to safely use, store and throw away your medicines at www.disposemymeds.org.          This list is accurate as of 6/20/18  2:34 PM.  Always use your most recent med list.                   Brand Name Dispense Instructions for use " Diagnosis    albuterol 108 (90 Base) MCG/ACT Inhaler    PROAIR HFA    2 Inhaler    Inhale 2 puffs into the lungs every 4 hours as needed for asthma symptoms.    Mild persistent asthma without complication       EPINEPHrine 0.3 MG/0.3ML injection    EPIPEN    2 each    Inject 0.3 mLs into the muscle once as needed for anaphylaxis.    Allergy to peanuts       Spacer/Aero Chamber Mouthpiece Misc     2 each    Use with albuterol and flovent    Mild persistent asthma

## 2018-06-20 NOTE — PROGRESS NOTES
Chief Complaint   Patient presents with     Left Knee - Surgical Followup     ACL reconstruction with HS autograft. DOS 3/20/18, 13 wk PO. Patient states her knee is doing pretty good. She continues with physical therapy, 1 time every 1-2 weeks. Denies any pain.      SURGERY: left knee anterior cruciate ligament reconstruction with hamstring autograft  DATE OF SURGERY: 3/20/2018.      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 Surgery occurred 13 weeks ago. She returns today doing well. She has been doing well with recovery. Occasional pain with bending it certain ways. Pain and stiffness with flexion over the anterior knee. Patient continues to go to physical therapy, 1x/ every 2weeks. Per physical therapy, she was doing some light jogging. She has been on the treadmill and bike when at the gym.     History of prior right anterior cruciate ligament reconstruction.      Past Medical History:   Diagnosis Date     Asthma        Past Surgical History:   Procedure Laterality Date     ARTHROPLASTY REVISION KNEE Left 03/2018    ACL repair     ARTHROSCOPIC RECONSTRUCTION ANTERIOR CRUCIATE LIGAMENT Right 1/29/2015    Procedure: ARTHROSCOPIC RECONSTRUCTION ANTERIOR CRUCIATE LIGAMENT;  Surgeon: Emile Menendez MD;  Location: [facility] OR     Patient Active Problem List    Diagnosis Date Noted     Morbid obesity with BMI of 45.0-49.9, adult (H) 05/18/2018     Priority: Medium     S/P ACL reconstruction 02/04/2015     Priority: Medium     Medial meniscus tear 12/24/2014     Priority: Medium     Tear of lateral cartilage or meniscus of knee, current 12/24/2014     Priority: Medium     Sprain of MCL (medial collateral ligament) of knee 12/24/2014     Priority: Medium     Learning problem? 07/31/2013     Priority: Medium     Marked  "on form by mom, but in office visit denied learning problems.  Discuss at next office visit.        Irregular menses 07/31/2013     Priority: Medium     Future orders for labs for PCOS.         Vitamin D deficiency disease 07/31/2013     Priority: Medium     Mild persistent asthma 10/17/2011     Priority: Medium     Allergy to peanuts 06/19/2006     Priority: Medium     Has Epi Pen.           Medications:   Current Outpatient Prescriptions:      albuterol (ALBUTEROL) 108 (90 BASE) MCG/ACT inhaler, Inhale 2 puffs into the lungs every 4 hours as needed for asthma symptoms., Disp: 2 Inhaler, Rfl: 1     EPINEPHrine (EPIPEN) 0.3 MG/0.3ML injection, Inject 0.3 mLs into the muscle once as needed for anaphylaxis., Disp: 2 each, Rfl: 3     Spacer/Aero Chamber Mouthpiece MISC, Use with albuterol and flovent, Disp: 2 each, Rfl: 0    Allergies:   Allergies   Allergen Reactions     Cats Itching     Fish      Nuts      Tree nuts, not peanuts     Trees Swelling       REVIEW OF SYSTEMS:   CONSTITUTIONAL:NEGATIVE for fever, chills, night sweats  INTEGUMENTARY/SKIN: NEGATIVE for worrisome wound problems or redness.  MUSCULOSKELETAL:See HPI above  NEURO: NEGATIVE for weakness, dizziness or paresthesias    This document serves as a record of the services and decisions personally performed and made by Emile Menendez MD. It was created on his behalf by Francia Gregory, a trained medical scribe. The creation of this document is based the provider's statements to the medical scribe.    Scribe Francia Gregory 1:28 PM 5/2/2018       PHYSICAL EXAM:  BP 99/68  Ht 1.562 m (5' 1.5\")  Wt 108.3 kg (238 lb 12.8 oz)  BMI 44.39 kg/m2   GENERAL APPEARANCE: healthy, alert, no distress  SKIN: no suspicious lesions or rashes  NEURO: Normal strength and tone, mentation intact and speech normal  PSYCH:  mentation appears normal and affect normal, not anxious  RESPIRATORY: No increased work of breathing.    LEFT LOWER EXTREMITY:  Gait: normal.  mild Quad atrophy, " strength weak  Intact sensation deep peroneal nerve, superficial peroneal nerve, med/lat tibial nerve, sural nerve, saphenous nerve  Intact EHL, EDL, TA, FHL, GS, quadriceps hamstrings and hip flexors  Toes warm and well perfused, brisk capillary refill. Palpable 2+ dp pulses.  calf soft and nttp or squeeze.  Edema: none      LEFT  KNEE EXAM:    Minimal swelling. No ecchymosis, no erythema  Incisions healed, skin approximated.  Squat: 100% with some anterior knee discomfort  range of motion : 3 degrees hyperextension, 120 degrees flexion  Effusion: none  Tender: nontender to palpation       Lachmans: stable. Firm endpoint.      X-RAY: no new x-rays  2 views left knee 5/2/2018: Post-surgical changes of anterior cruciate ligament surgery, lateral distal femoral metallic button noted.        Impression: Kat Rodriguez is a 17 year old female 13 weeks status post left knee arthroscopy and anterior cruciate ligament reconstruction with hamstring autograft and hamstring autograft, doing well.     Plan: routine postoperative knee anterior cruciate ligament hamstring autograft reconstruction      * continued to work on range of motion.  * WB status: as tolerated  * Rest  * Activity modification - avoid activities that aggravate symptoms. No running, jumping, cutting, pivoting, MMA  * She would like lose weight before returning to [facility]  * NSAIDS - regular use for inflammation, with food, as long as no contra-indications. Please discuss with pcp if needed.  * Ice twice daily to three times daily.  * Elevation of extremity to reduce swelling  * PT updated for strengthening, stretching and range of motion exercises, effusion control; continue postoperative anterior cruciate ligament hamstring protocol  * wean to Tylenol as needed for pain  * return to clinic 8 weeks, sooner as needed.      The information in this document, created by a scribe for me, accurately reflects the services I personally performed and the decisions made  by me. I have reviewed and approved this document for accuracy.        Emile Menendez M.D., M.S.  Dept. of Orthopaedic Surgery  [facility]

## 2018-06-20 NOTE — LETTER
6/20/2018         RE: Kat Rodriguez  [address] [place] [address]        Dear Colleague,    Thank you for referring your patient, Kat Rodriguez, to the [facility]. Please see a copy of my visit note below.    Chief Complaint   Patient presents with     Left Knee - Surgical Followup     ACL reconstruction with HS autograft. DOS 3/20/18, 13 wk PO. Patient states her knee is doing pretty good. She continues with physical therapy, 1 time every 1-2 weeks. Denies any pain.      SURGERY: left knee anterior cruciate ligament reconstruction with hamstring autograft  DATE OF SURGERY: 3/20/2018.      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 Surgery occurred 13 weeks ago. She returns today doing well. She has been doing well with recovery. Occasional pain with bending it certain ways. Pain and stiffness with flexion over the anterior knee. Patient continues to go to physical therapy, 1x/ every 2weeks. Per physical therapy, she was doing some light jogging. She has been on the treadmill and bike when at the gym.     History of prior right anterior cruciate ligament reconstruction.      Past Medical History:   Diagnosis Date     Asthma        Past Surgical History:   Procedure Laterality Date     ARTHROPLASTY REVISION KNEE Left 03/2018    ACL repair     ARTHROSCOPIC RECONSTRUCTION ANTERIOR CRUCIATE LIGAMENT Right 1/29/2015    Procedure: ARTHROSCOPIC RECONSTRUCTION ANTERIOR CRUCIATE LIGAMENT;  Surgeon: Emile Menendez MD;  Location:  OR     Patient Active Problem List    Diagnosis Date Noted     Morbid obesity with BMI of 45.0-49.9, adult (H) 05/18/2018     Priority: Medium     S/P ACL reconstruction 02/04/2015     Priority: Medium     Medial meniscus tear 12/24/2014      "Priority: Medium     Tear of lateral cartilage or meniscus of knee, current 12/24/2014     Priority: Medium     Sprain of MCL (medial collateral ligament) of knee 12/24/2014     Priority: Medium     Learning problem? 07/31/2013     Priority: Medium     Marked on form by mom, but in office visit denied learning problems.  Discuss at next office visit.        Irregular menses 07/31/2013     Priority: Medium     Future orders for labs for PCOS.         Vitamin D deficiency disease 07/31/2013     Priority: Medium     Mild persistent asthma 10/17/2011     Priority: Medium     Allergy to peanuts 06/19/2006     Priority: Medium     Has Epi Pen.           Medications:   Current Outpatient Prescriptions:      albuterol (ALBUTEROL) 108 (90 BASE) MCG/ACT inhaler, Inhale 2 puffs into the lungs every 4 hours as needed for asthma symptoms., Disp: 2 Inhaler, Rfl: 1     EPINEPHrine (EPIPEN) 0.3 MG/0.3ML injection, Inject 0.3 mLs into the muscle once as needed for anaphylaxis., Disp: 2 each, Rfl: 3     Spacer/Aero Chamber Mouthpiece MISC, Use with albuterol and flovent, Disp: 2 each, Rfl: 0    Allergies:   Allergies   Allergen Reactions     Cats Itching     Fish      Nuts      Tree nuts, not peanuts     Trees Swelling       REVIEW OF SYSTEMS:   CONSTITUTIONAL:NEGATIVE for fever, chills, night sweats  INTEGUMENTARY/SKIN: NEGATIVE for worrisome wound problems or redness.  MUSCULOSKELETAL:See HPI above  NEURO: NEGATIVE for weakness, dizziness or paresthesias    This document serves as a record of the services and decisions personally performed and made by Emile Menendez MD. It was created on his behalf by Francia Gregory, a trained medical scribe. The creation of this document is based the provider's statements to the medical scribe.    Scribprimo Gregory 1:28 PM 5/2/2018       PHYSICAL EXAM:  BP 99/68  Ht 1.562 m (5' 1.5\")  Wt 108.3 kg (238 lb 12.8 oz)  BMI 44.39 kg/m2   GENERAL APPEARANCE: healthy, alert, no distress  SKIN: no " suspicious lesions or rashes  NEURO: Normal strength and tone, mentation intact and speech normal  PSYCH:  mentation appears normal and affect normal, not anxious  RESPIRATORY: No increased work of breathing.    LEFT LOWER EXTREMITY:  Gait: normal.  mild Quad atrophy, strength weak  Intact sensation deep peroneal nerve, superficial peroneal nerve, med/lat tibial nerve, sural nerve, saphenous nerve  Intact EHL, EDL, TA, FHL, GS, quadriceps hamstrings and hip flexors  Toes warm and well perfused, brisk capillary refill. Palpable 2+ dp pulses.  calf soft and nttp or squeeze.  Edema: none      LEFT  KNEE EXAM:    Minimal swelling. No ecchymosis, no erythema  Incisions healed, skin approximated.  Squat: 100% with some anterior knee discomfort  range of motion : 3 degrees hyperextension, 120 degrees flexion  Effusion: none  Tender: nontender to palpation       Lachmans: stable. Firm endpoint.      X-RAY: no new x-rays  2 views left knee 5/2/2018: Post-surgical changes of anterior cruciate ligament surgery, lateral distal femoral metallic button noted.        Impression: Kat Rodriguez is a 17 year old female 13 weeks status post left knee arthroscopy and anterior cruciate ligament reconstruction with hamstring autograft and hamstring autograft, doing well.     Plan: routine postoperative knee anterior cruciate ligament hamstring autograft reconstruction      * continued to work on range of motion.  * WB status: as tolerated  * Rest  * Activity modification - avoid activities that aggravate symptoms. No running, jumping, cutting, pivoting, MMA  * She would like lose weight before returning to [facility]  * NSAIDS - regular use for inflammation, with food, as long as no contra-indications. Please discuss with pcp if needed.  * Ice twice daily to three times daily.  * Elevation of extremity to reduce swelling  * PT updated for strengthening, stretching and range of motion exercises, effusion control; continue postoperative anterior  cruciate ligament hamstring protocol  * wean to Tylenol as needed for pain  * return to clinic 8 weeks, sooner as needed.      The information in this document, created by a scribe for me, accurately reflects the services I personally performed and the decisions made by me. I have reviewed and approved this document for accuracy.        Emile Menendez M.D., M.S.  Dept. of Orthopaedic Surgery  [facility]      Again, thank you for allowing me to participate in the care of your patient.        Sincerely,        Emile Menendez MD

## 2018-06-25 ENCOUNTER — THERAPY VISIT (OUTPATIENT)
Dept: PHYSICAL THERAPY | Facility: CLINIC | Age: 18
End: 2018-06-25
Payer: COMMERCIAL

## 2018-06-25 DIAGNOSIS — Z98.890 S/P ACL RECONSTRUCTION: ICD-10-CM

## 2018-06-25 PROCEDURE — 97112 NEUROMUSCULAR REEDUCATION: CPT | Mod: GP | Performed by: PHYSICAL THERAPIST

## 2018-06-25 PROCEDURE — 97110 THERAPEUTIC EXERCISES: CPT | Mod: GP | Performed by: PHYSICAL THERAPIST

## 2018-06-25 PROCEDURE — 97530 THERAPEUTIC ACTIVITIES: CPT | Mod: GP | Performed by: PHYSICAL THERAPIST

## 2018-07-30 ENCOUNTER — THERAPY VISIT (OUTPATIENT)
Dept: PHYSICAL THERAPY | Facility: CLINIC | Age: 18
End: 2018-07-30
Payer: COMMERCIAL

## 2018-07-30 DIAGNOSIS — Z98.890 S/P ACL RECONSTRUCTION: ICD-10-CM

## 2018-07-30 PROCEDURE — 97110 THERAPEUTIC EXERCISES: CPT | Mod: GP | Performed by: PHYSICAL THERAPIST

## 2018-07-30 PROCEDURE — 97112 NEUROMUSCULAR REEDUCATION: CPT | Mod: GP | Performed by: PHYSICAL THERAPIST

## 2018-07-30 PROCEDURE — 97530 THERAPEUTIC ACTIVITIES: CPT | Mod: GP | Performed by: PHYSICAL THERAPIST

## 2018-07-30 NOTE — MR AVS SNAPSHOT
After Visit Summary   7/30/2018    Kat Rodriguez    MRN: [id number]           Patient Information     Date Of Birth          2000        Visit Information        Provider Department      7/30/2018 11:50 AM Zeferino Gil PT [facility]        Today's Diagnoses     S/P ACL reconstruction           Follow-ups after your visit        Your next 10 appointments already scheduled     Aug 07, 2018 12:40 PM CDT   HERMINIO Extremity with Zeferino Gil PT   [facility] ([place] [place]  )    [address]  [facility] [id number] [phone]              Who to contact     If you have questions or need follow up information about today's clinic visit or your schedule please contact [facility] directly at [phone].  Normal or non-critical lab and imaging results will be communicated to you by MyChart, letter or phone within 4 business days after the clinic has received the results. If you do not hear from us within 7 days, please contact the clinic through [employer] or phone. If you have a critical or abnormal lab result, we will notify you by phone as soon as possible.  Submit refill requests through [employer] or call your pharmacy and they will forward the refill request to us. Please allow 3 business days for your refill to be completed.          Additional Information About Your Visit        MyChart Information     [employer] gives you secure access to your electronic health record. If you see a primary care provider, you can also send messages to your care team and make appointments. If you have questions, please call your primary care clinic.  If you do not have a primary care provider, please call [phone] and they will assist you.        Care EveryWhere ID     This is your Care EveryWhere ID. This could be used by other organizations to access your [facility]  records  [id number]         Blood Pressure from Last 3 Encounters:   06/20/18 99/68   05/18/18 107/62   05/02/18 118/78    Weight from Last 3 Encounters:   06/20/18 108.3 kg (238 lb 12.8 oz) (>99 %)*   05/18/18 110 kg (242 lb 6.4 oz) (>99 %)*   05/18/18 110 kg (242 lb 6.4 oz) (>99 %)*     * Growth percentiles are based on [facility] 2-20 Years data.              We Performed the Following     Neuromuscular Re-Education     Therapeutic Activities     Therapeutic Exercises        Primary Care Provider Office Phone # Fax #    Cathleen Morgandestinee Linton [phone]       [address]  [facility] [address]        Equal Access to Services     [facility] HARPAL : Hadii aad ku hadasho Soomaali, waaxda luqadaha, qaybta kaalmada adeegyada, katelyn madden . So [facility] [phone].    ATENCIÓN: Si habla español, tiene a guerrier disposición servicios gratuitos de asistencia lingüística. Rachel al [phone].    We comply with applicable federal civil rights laws and [place] laws. We do not discriminate on the basis of race, color, national origin, age, disability, sex, sexual orientation, or gender identity.            Thank you!     Thank you for choosing [facility]  for your care. Our goal is always to provide you with excellent care. Hearing back from our patients is one way we can continue to improve our services. Please take a few minutes to complete the written survey that you may receive in the mail after your visit with us. Thank you!             Your Updated Medication List - Protect others around you: Learn how to safely use, store and throw away your medicines at www.disposemymeds.org.          This list is accurate as of 7/30/18 11:11 PM.  Always use your most recent med list.                   Brand Name Dispense Instructions for use Diagnosis    albuterol 108 (90 Base) MCG/ACT Inhaler    PROAIR HFA    2 Inhaler    Inhale 2 puffs into the  lungs every 4 hours as needed for asthma symptoms.    Mild persistent asthma without complication       EPINEPHrine 0.3 MG/0.3ML injection    EPIPEN    2 each    Inject 0.3 mLs into the muscle once as needed for anaphylaxis.    Allergy to peanuts       Spacer/Aero Chamber Mouthpiece Misc     2 each    Use with albuterol and flovent    Mild persistent asthma

## 2018-07-31 NOTE — PROGRESS NOTES
Subjective:  HPI                    Objective:  System    Physical Exam    General     ROS    Assessment/Plan:    PROGRESS  REPORT    Progress reporting period is from 4/17/2018 to 7/30/2018.       SUBJECTIVE  Subjective: MAYBE A LITTLE BETTER BUT NOT MUCH IMPROVEMENT.  PATIENT REPORTS THAT SHE IS NOT FAITHFUL TO HER HEP.   SOME DISCOMFORT WITH SQUATING.    Current Pain level: 0/10   Initial Pain level: 7/10   Changes in function: No changes noted in function since last SOAP note   Adverse reactions: None;   ,     The subjective and objective information are from the last SOAP note on this patient.    OBJECTIVE  Objective: WALKING GAIT: NORMAL. RESISTED L KNEE EXT:  4+/5 ,  FLEX:  4-/5.    PROM: FULL EXT. : FLEX: 130.       ASSESSMENT/PLAN  Updated problem list and treatment plan: Diagnosis 1:  S/P L ACL RECONSTRUCTION  Pain -  hot/cold therapy, US, electric stimulation, manual therapy, splint/taping/bracing/orthotics, self management, education, directional preference exercise and home program  Decreased ROM/flexibility - manual therapy, therapeutic exercise, therapeutic activity and home program  Decreased strength - therapeutic exercise, therapeutic activities and home program  Decreased function - therapeutic activities and home program  STG/LTGs have been met or progress has been made towards goals:  Yes, SLOW PROGRESS. POOR FOLLOW THROUGH ON HEP.   Assessment of Progress: The patient's condition is improving.  Self Management Plans:  Patient has been instructed in a home treatment program.  I have re-evaluated this patient and find that the nature, scope, duration and intensity of the therapy is appropriate for the medical condition of the patient.  Kat continues to require the following intervention to meet STG and LTG's: PT  CONTINUE PT    Recommendations:  This patient would benefit from continued therapy.     Frequency:  1 X week, once daily  Duration:  for 16 weeks        Please refer to the daily  flowsheet for treatment today, total treatment time and time spent performing 1:1 timed codes.

## 2018-08-07 ENCOUNTER — THERAPY VISIT (OUTPATIENT)
Dept: PHYSICAL THERAPY | Facility: CLINIC | Age: 18
End: 2018-08-07
Payer: COMMERCIAL

## 2018-08-07 DIAGNOSIS — Z98.890 S/P ACL RECONSTRUCTION: ICD-10-CM

## 2018-08-07 PROCEDURE — 97112 NEUROMUSCULAR REEDUCATION: CPT | Mod: GP | Performed by: PHYSICAL THERAPIST

## 2018-08-07 PROCEDURE — 97110 THERAPEUTIC EXERCISES: CPT | Mod: GP | Performed by: PHYSICAL THERAPIST

## 2018-08-07 PROCEDURE — 97530 THERAPEUTIC ACTIVITIES: CPT | Mod: GP | Performed by: PHYSICAL THERAPIST

## 2018-08-14 ENCOUNTER — THERAPY VISIT (OUTPATIENT)
Dept: PHYSICAL THERAPY | Facility: CLINIC | Age: 18
End: 2018-08-14
Payer: COMMERCIAL

## 2018-08-14 DIAGNOSIS — Z98.890 S/P ACL RECONSTRUCTION: ICD-10-CM

## 2018-08-14 PROCEDURE — 97112 NEUROMUSCULAR REEDUCATION: CPT | Mod: GP | Performed by: PHYSICAL THERAPIST

## 2018-08-14 PROCEDURE — 97530 THERAPEUTIC ACTIVITIES: CPT | Mod: GP | Performed by: PHYSICAL THERAPIST

## 2018-08-14 PROCEDURE — 97110 THERAPEUTIC EXERCISES: CPT | Mod: GP | Performed by: PHYSICAL THERAPIST

## 2018-08-14 NOTE — MR AVS SNAPSHOT
After Visit Summary   8/14/2018    Kat Rodriguez    MRN: [id number]           Patient Information     Date Of Birth          2000        Visit Information        Provider Department      8/14/2018 12:40 PM Zeferino Gil PT [facility]        Today's Diagnoses     S/P ACL reconstruction           Follow-ups after your visit        Your next 10 appointments already scheduled     Aug 21, 2018 12:40 PM CDT   HERMIN[facility] Extremity with Zeferino Gil PT   [facility] ([place]  )    [address]  [facility] [id number] [phone]              Who to contact     If you have questions or need follow up information about today's clinic visit or your schedule please contact [facility] directly at [phone].  Normal or non-critical lab and imaging results will be communicated to you by MyChart, letter or phone within 4 business days after the clinic has received the results. If you do not hear from us within 7 days, please contact the clinic through [employer] or phone. If you have a critical or abnormal lab result, we will notify you by phone as soon as possible.  Submit refill requests through [employer] or call your pharmacy and they will forward the refill request to us. Please allow 3 business days for your refill to be completed.          Additional Information About Your Visit        MyChart Information     [employer] gives you secure access to your electronic health record. If you see a primary care provider, you can also send messages to your care team and make appointments. If you have questions, please call your primary care clinic.  If you do not have a primary care provider, please call [phone] and they will assist you.        Care EveryWhere ID     This is your Care EveryWhere ID. This could be used by other organizations to access your [facility]  records  [id number]         Blood Pressure from Last 3 Encounters:   06/20/18 99/68   05/18/18 107/62   05/02/18 118/78    Weight from Last 3 Encounters:   06/20/18 108.3 kg (238 lb 12.8 oz) (>99 %)*   05/18/18 110 kg (242 lb 6.4 oz) (>99 %)*   05/18/18 110 kg (242 lb 6.4 oz) (>99 %)*     * Growth percentiles are based on [facility] 2-20 Years data.              We Performed the Following     Neuromuscular Re-Education     Therapeutic Activities     Therapeutic Exercises        Primary Care Provider Office Phone # Fax #    Cathleen Morgandestinee Linotn [phone]       [address]  [facility] [address]        Equal Access to Services     [facility] HARPAL : Hadii aad ku hadasho Soomaali, waaxda luqadaha, qaybta kaalmada adeegyada, katelyn madden . So [facility] [phone].    ATENCIÓN: Si habla español, tiene a guerrier disposición servicios gratuitos de asistencia lingüística. Rachel al [phone].    We comply with applicable federal civil rights laws and [place] laws. We do not discriminate on the basis of race, color, national origin, age, disability, sex, sexual orientation, or gender identity.            Thank you!     Thank you for choosing [facility]  for your care. Our goal is always to provide you with excellent care. Hearing back from our patients is one way we can continue to improve our services. Please take a few minutes to complete the written survey that you may receive in the mail after your visit with us. Thank you!             Your Updated Medication List - Protect others around you: Learn how to safely use, store and throw away your medicines at www.disposemymeds.org.          This list is accurate as of 8/14/18  1:30 PM.  Always use your most recent med list.                   Brand Name Dispense Instructions for use Diagnosis    albuterol 108 (90 Base) MCG/ACT inhaler    PROAIR HFA    2 Inhaler    Inhale 2 puffs into the  lungs every 4 hours as needed for asthma symptoms.    Mild persistent asthma without complication       EPINEPHrine 0.3 MG/0.3ML injection    EPIPEN    2 each    Inject 0.3 mLs into the muscle once as needed for anaphylaxis.    Allergy to peanuts       Spacer/Aero Chamber Mouthpiece Misc     2 each    Use with albuterol and flovent    Mild persistent asthma

## 2018-08-28 ENCOUNTER — THERAPY VISIT (OUTPATIENT)
Dept: PHYSICAL THERAPY | Facility: CLINIC | Age: 18
End: 2018-08-28
Payer: COMMERCIAL

## 2018-08-28 DIAGNOSIS — Z98.890 S/P ACL RECONSTRUCTION: ICD-10-CM

## 2018-08-28 PROCEDURE — 97530 THERAPEUTIC ACTIVITIES: CPT | Mod: GP | Performed by: PHYSICAL THERAPIST

## 2018-08-28 PROCEDURE — 97112 NEUROMUSCULAR REEDUCATION: CPT | Mod: GP | Performed by: PHYSICAL THERAPIST

## 2018-08-28 PROCEDURE — 97110 THERAPEUTIC EXERCISES: CPT | Mod: GP | Performed by: PHYSICAL THERAPIST

## 2018-09-06 ENCOUNTER — THERAPY VISIT (OUTPATIENT)
Dept: PHYSICAL THERAPY | Facility: CLINIC | Age: 18
End: 2018-09-06
Payer: COMMERCIAL

## 2018-09-06 DIAGNOSIS — Z98.890 S/P ACL RECONSTRUCTION: ICD-10-CM

## 2018-09-06 PROCEDURE — 97530 THERAPEUTIC ACTIVITIES: CPT | Mod: GP | Performed by: PHYSICAL THERAPIST

## 2018-09-06 PROCEDURE — 97110 THERAPEUTIC EXERCISES: CPT | Mod: GP | Performed by: PHYSICAL THERAPIST

## 2018-09-06 PROCEDURE — 97112 NEUROMUSCULAR REEDUCATION: CPT | Mod: GP | Performed by: PHYSICAL THERAPIST

## 2018-09-20 ENCOUNTER — THERAPY VISIT (OUTPATIENT)
Dept: PHYSICAL THERAPY | Facility: CLINIC | Age: 18
End: 2018-09-20
Payer: COMMERCIAL

## 2018-09-20 DIAGNOSIS — Z98.890 S/P ACL RECONSTRUCTION: ICD-10-CM

## 2018-09-20 PROCEDURE — 97110 THERAPEUTIC EXERCISES: CPT | Mod: GP | Performed by: PHYSICAL THERAPIST

## 2018-09-20 PROCEDURE — 97112 NEUROMUSCULAR REEDUCATION: CPT | Mod: GP | Performed by: PHYSICAL THERAPIST

## 2018-09-20 PROCEDURE — 97530 THERAPEUTIC ACTIVITIES: CPT | Mod: GP | Performed by: PHYSICAL THERAPIST

## 2018-09-20 ASSESSMENT — ACTIVITIES OF DAILY LIVING (ADL)
LIMPING: I DO NOT HAVE THE SYMPTOM
RISE FROM A CHAIR: ACTIVITY IS NOT DIFFICULT
WALK: ACTIVITY IS NOT DIFFICULT
SQUAT: ACTIVITY IS MINIMALLY DIFFICULT
HOW_WOULD_YOU_RATE_THE_CURRENT_FUNCTION_OF_YOUR_KNEE_DURING_YOUR_USUAL_DAILY_ACTIVITIES_ON_A_SCALE_FROM_0_TO_100_WITH_100_BEING_YOUR_LEVEL_OF_KNEE_FUNCTION_PRIOR_TO_YOUR_INJURY_AND_0_BEING_THE_INABILITY_TO_PERFORM_ANY_OF_YOUR_USUAL_DAILY_ACTIVITIES?: 99
STAND: ACTIVITY IS NOT DIFFICULT
RAW_SCORE: 68
PAIN: I DO NOT HAVE THE SYMPTOM
GIVING WAY, BUCKLING OR SHIFTING OF KNEE: I DO NOT HAVE THE SYMPTOM
STIFFNESS: I DO NOT HAVE THE SYMPTOM
GO DOWN STAIRS: ACTIVITY IS NOT DIFFICULT
KNEE_ACTIVITY_OF_DAILY_LIVING_SCORE: 97.14
GO UP STAIRS: ACTIVITY IS NOT DIFFICULT
KNEE_ACTIVITY_OF_DAILY_LIVING_SUM: 68
KNEEL ON THE FRONT OF YOUR KNEE: ACTIVITY IS NOT DIFFICULT
HOW_WOULD_YOU_RATE_THE_OVERALL_FUNCTION_OF_YOUR_KNEE_DURING_YOUR_USUAL_DAILY_ACTIVITIES?: NEARLY NORMAL
WEAKNESS: I HAVE THE SYMPTOM BUT IT DOES NOT AFFECT MY ACTIVITY
AS_A_RESULT_OF_YOUR_KNEE_INJURY,_HOW_WOULD_YOU_RATE_YOUR_CURRENT_LEVEL_OF_DAILY_ACTIVITY?: NORMAL
SIT WITH YOUR KNEE BENT: ACTIVITY IS NOT DIFFICULT
SWELLING: I DO NOT HAVE THE SYMPTOM

## 2018-09-20 NOTE — PROGRESS NOTES
Subjective:  HPI                    Objective:  System    Physical Exam    General     ROS    Assessment/Plan:    DISCHARGE REPORT    Progress reporting period is from 4/17/2018 to 9/20/2018.     SUBJECTIVE  Subjective: EASIER TO KNEEL.    Current Pain level: 0/10   Initial Pain level: 7/10   Changes in function: Yes, see goal flow sheet for change in function   Adverse reactions: None;   ,     The subjective and objective information are from the last SOAP note on this patient.    OBJECTIVE  Objective: EVEN GAIT ON TREADMILL.    RESISTED: EXT: 5/5, FLEX: 5/5.    PROM: 8-0-126.     SINGLE LEG SQUAT: MINIMAL QUING NEEDED, GOOD CONTROL.       ASSESSMENT/PLAN  Updated problem list and treatment plan: Diagnosis 1:  S/P LEFT ACL RECONSTRUCTION.  NO COMPLAINTS. DOING WELL.   STG/LTGs have been met or progress has been made towards goals:  Yes (See Goal flow sheet completed today.)  Assessment of Progress: The patient's condition is improving.  Self Management Plans:  Patient has been instructed in a home treatment program.  I have re-evaluated this patient and find that the nature, scope, duration and intensity of the therapy is appropriate for the medical condition of the patient.  Kat continues to require the following intervention to meet STG and LTG's: PT intervention is no longer required to meet STG/LTG.  We will discharge this patient from PT.    Recommendations:  This patient is ready to be discharged from therapy and continue their home treatment program.    Please refer to the daily flowsheet for treatment today, total treatment time and time spent performing 1:1 timed codes.

## 2018-09-20 NOTE — MR AVS SNAPSHOT
After Visit Summary   9/20/2018    Kat Rodriguez    MRN: [id number]           Patient Information     Date Of Birth          2000        Visit Information        Provider Department      9/20/2018 2:00 PM Zeferino Gil PT [facility]        Today's Diagnoses     S/P ACL reconstruction           Follow-ups after your visit        Your next 10 appointments already scheduled     Sep 26, 2018  2:00 PM CDT   [facility] with Emile Menendez MD   [facility]    [address] [place] [id number] [phone]              Who to contact     If you have questions or need follow up information about today's clinic visit or your schedule please contact [facility] directly at [phone].  Normal or non-critical lab and imaging results will be communicated to you by MyChart, letter or phone within 4 business days after the clinic has received the results. If you do not hear from us within 7 days, please contact the clinic through [employer] or phone. If you have a critical or abnormal lab result, we will notify you by phone as soon as possible.  Submit refill requests through [employer] or call your pharmacy and they will forward the refill request to us. Please allow 3 business days for your refill to be completed.          Additional Information About Your Visit        MyChart Information     [employer] gives you secure access to your electronic health record. If you see a primary care provider, you can also send messages to your care team and make appointments. If you have questions, please call your primary care clinic.  If you do not have a primary care provider, please call [phone] and they will assist you.        Care EveryWhere ID     This is your Care EveryWhere ID. This could be used by other organizations to access your [facility]  records  [id number]         Blood Pressure from Last 3 Encounters:   06/20/18 99/68   05/18/18 107/62   05/02/18 118/78    Weight from Last 3 Encounters:   06/20/18 108.3 kg (238 lb 12.8 oz) (>99 %)*   05/18/18 110 kg (242 lb 6.4 oz) (>99 %)*   05/18/18 110 kg (242 lb 6.4 oz) (>99 %)*     * Growth percentiles are based on [facility] 2-20 Years data.              We Performed the Following     HERMINIO Progress Notes Report     Neuromuscular Re-Education     Therapeutic Activities     Therapeutic Exercises        Primary Care Provider Office Phone # Fax #    Cathleen Lore Linton [phone] [address]  [facility] [address]        Equal Access to Services     JERROD ROWAN : Hadii aad ku hadasho Soomaali, waaxda luqadaha, qaybta kaalmada adeegyada, katelyn santana haykamran madden . So [facility] [phone].    ATENCIÓN: Si habla español, tiene a guerrier disposición servicios gratuitos de asistencia lingüística. Rachel al [phone].    We comply with applicable federal civil rights laws and [place] laws. We do not discriminate on the basis of race, color, national origin, age, disability, sex, sexual orientation, or gender identity.            Thank you!     Thank you for choosing [facility]  for your care. Our goal is always to provide you with excellent care. Hearing back from our patients is one way we can continue to improve our services. Please take a few minutes to complete the written survey that you may receive in the mail after your visit with us. Thank you!             Your Updated Medication List - Protect others around you: Learn how to safely use, store and throw away your medicines at www.disposemymeds.org.          This list is accurate as of 9/20/18  3:58 PM.  Always use your most recent med list.                   Brand Name Dispense Instructions for use Diagnosis    albuterol 108 (90 Base) MCG/ACT inhaler    PROAIR HFA    2  Inhaler    Inhale 2 puffs into the lungs every 4 hours as needed for asthma symptoms.    Mild persistent asthma without complication       EPINEPHrine 0.3 MG/0.3ML injection    EPIPEN    2 each    Inject 0.3 mLs into the muscle once as needed for anaphylaxis.    Allergy to peanuts       Spacer/Aero Chamber Mouthpiece Misc     2 each    Use with albuterol and flovent    Mild persistent asthma

## 2018-09-26 ENCOUNTER — OFFICE VISIT (OUTPATIENT)
Dept: ORTHOPEDICS | Facility: CLINIC | Age: 18
End: 2018-09-26
Payer: COMMERCIAL

## 2018-09-26 VITALS
WEIGHT: 246.4 LBS | HEIGHT: 62 IN | OXYGEN SATURATION: 96 % | SYSTOLIC BLOOD PRESSURE: 121 MMHG | HEART RATE: 107 BPM | DIASTOLIC BLOOD PRESSURE: 80 MMHG | BODY MASS INDEX: 45.34 KG/M2

## 2018-09-26 DIAGNOSIS — Z98.890 STATUS POST RECONSTRUCTION OF ANTERIOR CRUCIATE LIGAMENT: Primary | ICD-10-CM

## 2018-09-26 PROCEDURE — 99213 OFFICE O/P EST LOW 20 MIN: CPT | Performed by: ORTHOPAEDIC SURGERY

## 2018-09-26 ASSESSMENT — PAIN SCALES - GENERAL: PAINLEVEL: NO PAIN (0)

## 2018-09-26 NOTE — PROGRESS NOTES
Chief Complaint   Patient presents with     Left Knee - Surgical Followup     ACL reconstruction with HS autograft. DOS 3/20/18, 6 month PO. Patient states her knee is doing pretty good. She has been discharged from physical therapy last week. Everything went well. Denies any pain.      SURGERY: left knee anterior cruciate ligament reconstruction with hamstring autograft  DATE OF SURGERY: 3/20/2018.      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 Surgery occurred 6 months. She returns today doing well. Has completed physical therapy last week. She has returned to normal daily activities. No problems or concerns. Has not yet returned to MMA or soccer.    History of prior right anterior cruciate ligament reconstruction 1/2015.      Past Medical History:   Diagnosis Date     Asthma        Past Surgical History:   Procedure Laterality Date     ARTHROPLASTY REVISION KNEE Left 03/2018    ACL repair     ARTHROSCOPIC RECONSTRUCTION ANTERIOR CRUCIATE LIGAMENT Right 1/29/2015    Procedure: ARTHROSCOPIC RECONSTRUCTION ANTERIOR CRUCIATE LIGAMENT;  Surgeon: Emile Menendez MD;  Location:  OR     Patient Active Problem List    Diagnosis Date Noted     Morbid obesity with BMI of 45.0-49.9, adult (H) 05/18/2018     Priority: Medium     Medial meniscus tear 12/24/2014     Priority: Medium     Tear of lateral cartilage or meniscus of knee, current 12/24/2014     Priority: Medium     Sprain of MCL (medial collateral ligament) of knee 12/24/2014     Priority: Medium     Learning problem? 07/31/2013     Priority: Medium     Marked on form by mom, but in office visit denied learning problems.  Discuss at next office visit.        Irregular menses 07/31/2013     Priority: Medium     Future orders for labs for PCOS.         Vitamin  "D deficiency disease 07/31/2013     Priority: Medium     Mild persistent asthma 10/17/2011     Priority: Medium     Allergy to peanuts 06/19/2006     Priority: Medium     Has Epi Pen.       Medications:   Current Outpatient Prescriptions:      albuterol (ALBUTEROL) 108 (90 BASE) MCG/ACT inhaler, Inhale 2 puffs into the lungs every 4 hours as needed for asthma symptoms., Disp: 2 Inhaler, Rfl: 1     EPINEPHrine (EPIPEN) 0.3 MG/0.3ML injection, Inject 0.3 mLs into the muscle once as needed for anaphylaxis., Disp: 2 each, Rfl: 3     Spacer/Aero Chamber Mouthpiece MISC, Use with albuterol and flovent, Disp: 2 each, Rfl: 0    Allergies:   Allergies   Allergen Reactions     Cats Itching     Fish      Nuts      Tree nuts, not peanuts     Trees Swelling       REVIEW OF SYSTEMS:   CONSTITUTIONAL:NEGATIVE for fever, chills, night sweats  INTEGUMENTARY/SKIN: NEGATIVE for worrisome wound problems or redness.  MUSCULOSKELETAL:See HPI above  NEURO: NEGATIVE for weakness, dizziness or paresthesias    This document serves as a record of the services and decisions personally performed and made by Emile Menendez MD. It was created on his behalf by Francia Gregory, a trained medical scribe. The creation of this document is based the provider's statements to the medical scribe.    Scribe Francia Gregory 2:16 PM 9/26/2018       PHYSICAL EXAM:  /80  Pulse 107  Ht 1.562 m (5' 1.5\")  Wt 111.8 kg (246 lb 6.4 oz)  SpO2 96%  BMI 45.8 kg/m2   GENERAL APPEARANCE: healthy, alert, no distress  SKIN: no suspicious lesions or rashes  NEURO: Normal strength and tone, mentation intact and speech normal  PSYCH:  mentation appears normal and affect normal, not anxious  RESPIRATORY: No increased work of breathing.    LEFT LOWER EXTREMITY:  Gait: normal.  No Quad atrophy, strength slight weak compared to the right.  Intact sensation deep peroneal nerve, superficial peroneal nerve, med/lat tibial nerve, sural nerve, saphenous nerve  Intact EHL, EDL, TA, " FHL, GS, quadriceps hamstrings and hip flexors  Toes warm and well perfused, brisk capillary refill. Palpable 2+ dp pulses.  calf soft and nttp or squeeze.  Edema: none    LEFT  KNEE EXAM:    Minimal swelling. No ecchymosis, no erythema  Incisions healed, skin approximated.  Squat: 100% without discomfort.  range of motion : 3 degrees hyperextension, 120 degrees flexion  Effusion: none  Tender: nontender to palpation       Lachmans: stable. Firm endpoint.      X-RAY: no new x-rays  2 views left knee 5/2/2018: Post-surgical changes of anterior cruciate ligament surgery, lateral distal femoral metallic button noted.       Impression: Kat Rodriguez is a 17 year old female 6 months status post left knee arthroscopy and anterior cruciate ligament reconstruction with hamstring autograft and hamstring autograft, doing well.     Plan:      * continued to work on range of motion. Strengthening.  * WB status: as tolerated  * Rest  * Activity modification - avoid activities that aggravate symptoms. OK to start light jogging. Bike, elliptical  OK  * OK to return to [facility] or soccer after 3 months.   * She would like lose weight before returning to [facility] which is a great idea.  * NSAIDS - regular use for inflammation, with food, as long as no contra-indications. Please discuss with pcp if needed.  * Ice twice daily to three times daily.  * Elevation of extremity to reduce swelling  * wean to Tylenol as needed for pain  * return to clinic as needed.       The information in this document, created by a scribe for me, accurately reflects the services I personally performed and the decisions made by me. I have reviewed and approved this document for accuracy.        Emile Menendez M.D., M.S.  Dept. of Orthopaedic Surgery  [facility]

## 2018-09-26 NOTE — LETTER
9/26/2018         RE: Kat Rodriguez  [address] [place] [id number]        Dear Colleague,    Thank you for referring your patient, Kat Rodriguez, to the [facility]. Please see a copy of my visit note below.    Chief Complaint   Patient presents with     Left Knee - Surgical Followup     ACL reconstruction with HS autograft. DOS 3/20/18, 6 month PO. Patient states her knee is doing pretty good. She has been discharged from physical therapy last week. Everything went well. Denies any pain.      SURGERY: left knee anterior cruciate ligament reconstruction with hamstring autograft  DATE OF SURGERY: 3/20/2018.      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 Surgery occurred 6 months. She returns today doing well. Has completed physical therapy last week. She has returned to normal daily activities. No problems or concerns. Has not yet returned to MMA or soccer.    History of prior right anterior cruciate ligament reconstruction 1/2015.      Past Medical History:   Diagnosis Date     Asthma        Past Surgical History:   Procedure Laterality Date     ARTHROPLASTY REVISION KNEE Left 03/2018    ACL repair     ARTHROSCOPIC RECONSTRUCTION ANTERIOR CRUCIATE LIGAMENT Right 1/29/2015    Procedure: ARTHROSCOPIC RECONSTRUCTION ANTERIOR CRUCIATE LIGAMENT;  Surgeon: Emile Menendez MD;  Location:  OR     Patient Active Problem List    Diagnosis Date Noted     Morbid obesity with BMI of 45.0-49.9, adult (H) 05/18/2018     Priority: Medium     Medial meniscus tear 12/24/2014     Priority: Medium     Tear of lateral cartilage or meniscus of knee, current 12/24/2014     Priority: Medium     Sprain of MCL (medial collateral ligament) of knee 12/24/2014     Priority: Medium      "Learning problem? 07/31/2013     Priority: Medium     Marked on form by mom, but in office visit denied learning problems.  Discuss at next office visit.        Irregular menses 07/31/2013     Priority: Medium     Future orders for labs for PCOS.         Vitamin D deficiency disease 07/31/2013     Priority: Medium     Mild persistent asthma 10/17/2011     Priority: Medium     Allergy to peanuts 06/19/2006     Priority: Medium     Has Epi Pen.       Medications:   Current Outpatient Prescriptions:      albuterol (ALBUTEROL) 108 (90 BASE) MCG/ACT inhaler, Inhale 2 puffs into the lungs every 4 hours as needed for asthma symptoms., Disp: 2 Inhaler, Rfl: 1     EPINEPHrine (EPIPEN) 0.3 MG/0.3ML injection, Inject 0.3 mLs into the muscle once as needed for anaphylaxis., Disp: 2 each, Rfl: 3     Spacer/Aero Chamber Mouthpiece MISC, Use with albuterol and flovent, Disp: 2 each, Rfl: 0    Allergies:   Allergies   Allergen Reactions     Cats Itching     Fish      Nuts      Tree nuts, not peanuts     Trees Swelling       REVIEW OF SYSTEMS:   CONSTITUTIONAL:NEGATIVE for fever, chills, night sweats  INTEGUMENTARY/SKIN: NEGATIVE for worrisome wound problems or redness.  MUSCULOSKELETAL:See HPI above  NEURO: NEGATIVE for weakness, dizziness or paresthesias    This document serves as a record of the services and decisions personally performed and made by Emile Menendez MD. It was created on his behalf by Francia Gregory, a trained medical scribe. The creation of this document is based the provider's statements to the medical scribe.    Scribe Francia Gregory 2:16 PM 9/26/2018       PHYSICAL EXAM:  /80  Pulse 107  Ht 1.562 m (5' 1.5\")  Wt 111.8 kg (246 lb 6.4 oz)  SpO2 96%  BMI 45.8 kg/m2   GENERAL APPEARANCE: healthy, alert, no distress  SKIN: no suspicious lesions or rashes  NEURO: Normal strength and tone, mentation intact and speech normal  PSYCH:  mentation appears normal and affect normal, not anxious  RESPIRATORY: No increased " work of breathing.    LEFT LOWER EXTREMITY:  Gait: normal.  No Quad atrophy, strength slight weak compared to the right.  Intact sensation deep peroneal nerve, superficial peroneal nerve, med/lat tibial nerve, sural nerve, saphenous nerve  Intact EHL, EDL, TA, FHL, GS, quadriceps hamstrings and hip flexors  Toes warm and well perfused, brisk capillary refill. Palpable 2+ dp pulses.  calf soft and nttp or squeeze.  Edema: none    LEFT  KNEE EXAM:    Minimal swelling. No ecchymosis, no erythema  Incisions healed, skin approximated.  Squat: 100% without discomfort.  range of motion : 3 degrees hyperextension, 120 degrees flexion  Effusion: none  Tender: nontender to palpation       Lachmans: stable. Firm endpoint.      X-RAY: no new x-rays  2 views left knee 5/2/2018: Post-surgical changes of anterior cruciate ligament surgery, lateral distal femoral metallic button noted.       Impression: Kat Rordiguez is a 17 year old female 6 months status post left knee arthroscopy and anterior cruciate ligament reconstruction with hamstring autograft and hamstring autograft, doing well.     Plan:      * continued to work on range of motion. Strengthening.  * WB status: as tolerated  * Rest  * Activity modification - avoid activities that aggravate symptoms. OK to start light jogging. Bike, elliptical  OK  * OK to return to [facility] or soccer after 3 months.   * She would like lose weight before returning to [facility] which is a great idea.  * NSAIDS - regular use for inflammation, with food, as long as no contra-indications. Please discuss with pcp if needed.  * Ice twice daily to three times daily.  * Elevation of extremity to reduce swelling  * wean to Tylenol as needed for pain  * return to clinic as needed.       The information in this document, created by a scribe for me, accurately reflects the services I personally performed and the decisions made by me. I have reviewed and approved this document for accuracy.        Emile AGUILAR  LAYLA Menendez., M.S.  Dept. of Orthopaedic Surgery  [facility]      Again, thank you for allowing me to participate in the care of your patient.        Sincerely,        Emile Menendez MD

## 2018-09-26 NOTE — MR AVS SNAPSHOT
"              After Visit Summary   9/26/2018    Kat Rodriguez    MRN: [id number]           Patient Information     Date Of Birth          2000        Visit Information        Provider Department      9/26/2018 2:00 PM Emile Menendez MD [facility]        Today's Diagnoses     Status post reconstruction of anterior cruciate ligament    -  1       Follow-ups after your visit        Follow-up notes from your care team     Return if symptoms worsen or fail to improve.      Who to contact     If you have questions or need follow up information about today's clinic visit or your schedule please contact [facility] directly at [phone].  Normal or non-critical lab and imaging results will be communicated to you by MyChart, letter or phone within 4 business days after the clinic has received the results. If you do not hear from us within 7 days, please contact the clinic through [email or web address] or phone. If you have a critical or abnormal lab result, we will notify you by phone as soon as possible.  Submit refill requests through [employer] or call your pharmacy and they will forward the refill request to us. Please allow 3 business days for your refill to be completed.          Additional Information About Your Visit        MyChart Information     [employer] gives you secure access to your electronic health record. If you see a primary care provider, you can also send messages to your care team and make appointments. If you have questions, please call your primary care clinic.  If you do not have a primary care provider, please call [phone] and they will assist you.        Care EveryWhere ID     This is your Care EveryWhere ID. This could be used by other organizations to access your [facility] medical records  [id number]        Your Vitals Were     Pulse Height Pulse Oximetry BMI (Body Mass Index)          107 5' 1.5\" (1.562 m) 96% 45.8 kg/m2         Blood Pressure from Last 3 " Encounters:   09/26/18 121/80   06/20/18 99/68   05/18/18 107/62    Weight from Last 3 Encounters:   09/26/18 246 lb 6.4 oz (111.8 kg) (>99 %)*   06/20/18 238 lb 12.8 oz (108.3 kg) (>99 %)*   05/18/18 242 lb 6.4 oz (110 kg) (>99 %)*     * Growth percentiles are based on [facility] 2-20 Years data.              Today, you had the following     No orders found for display       Primary Care Provider Office Phone # Fax #    Cathleen Lore Linton [phone]       [address]  [facility] [address]        Equal Access to Services     NANCY ROWNA : Hadii aad ku hadasho Soomaali, waaxda luqadaha, qaybta kaalmada adeegyada, waxay idiin hayaadestinee madden . So [facility] [phone].    ATENCIÓN: Si habla español, tiene a guerrier disposición servicios gratuitos de asistencia lingüística. Llame al [phone].    We comply with applicable federal civil rights laws and [place] laws. We do not discriminate on the basis of race, color, national origin, age, disability, sex, sexual orientation, or gender identity.            Thank you!     Thank you for choosing [facility]  for your care. Our goal is always to provide you with excellent care. Hearing back from our patients is one way we can continue to improve our services. Please take a few minutes to complete the written survey that you may receive in the mail after your visit with us. Thank you!             Your Updated Medication List - Protect others around you: Learn how to safely use, store and throw away your medicines at www.disposemymeds.org.          This list is accurate as of 9/26/18  4:18 PM.  Always use your most recent med list.                   Brand Name Dispense Instructions for use Diagnosis    albuterol 108 (90 Base) MCG/ACT inhaler    PROAIR HFA    2 Inhaler    Inhale 2 puffs into the lungs every 4 hours as needed for asthma symptoms.    Mild persistent asthma without complication       EPINEPHrine 0.3  MG/0.3ML injection    EPIPEN    2 each    Inject 0.3 mLs into the muscle once as needed for anaphylaxis.    Allergy to peanuts       Spacer/Aero Chamber Mouthpiece Misc     2 each    Use with albuterol and flovent    Mild persistent asthma

## 2019-01-17 ENCOUNTER — OFFICE VISIT (OUTPATIENT)
Dept: FAMILY MEDICINE | Facility: CLINIC | Age: 19
End: 2019-01-17
Payer: COMMERCIAL

## 2019-01-17 VITALS
OXYGEN SATURATION: 96 % | TEMPERATURE: 97.9 F | BODY MASS INDEX: 42.14 KG/M2 | WEIGHT: 229 LBS | HEART RATE: 69 BPM | SYSTOLIC BLOOD PRESSURE: 102 MMHG | HEIGHT: 62 IN | DIASTOLIC BLOOD PRESSURE: 64 MMHG

## 2019-01-17 DIAGNOSIS — J30.81 CHRONIC ALLERGIC RHINITIS DUE TO ANIMAL HAIR AND DANDER: ICD-10-CM

## 2019-01-17 DIAGNOSIS — J45.30 MILD PERSISTENT ASTHMA WITHOUT COMPLICATION: Primary | ICD-10-CM

## 2019-01-17 DIAGNOSIS — Z23 NEED FOR PROPHYLACTIC VACCINATION AND INOCULATION AGAINST INFLUENZA: ICD-10-CM

## 2019-01-17 PROCEDURE — 99214 OFFICE O/P EST MOD 30 MIN: CPT | Mod: 25 | Performed by: FAMILY MEDICINE

## 2019-01-17 PROCEDURE — 90686 IIV4 VACC NO PRSV 0.5 ML IM: CPT | Performed by: FAMILY MEDICINE

## 2019-01-17 PROCEDURE — 90471 IMMUNIZATION ADMIN: CPT | Performed by: FAMILY MEDICINE

## 2019-01-17 RX ORDER — ALBUTEROL SULFATE 90 UG/1
2 AEROSOL, METERED RESPIRATORY (INHALATION) EVERY 4 HOURS PRN
Qty: 2 INHALER | Refills: 1 | Status: SHIPPED | OUTPATIENT
Start: 2019-01-17 | End: 2019-12-29

## 2019-01-17 RX ORDER — CETIRIZINE HYDROCHLORIDE 10 MG/1
10 TABLET ORAL DAILY PRN
Qty: 365 TABLET | COMMUNITY
Start: 2019-01-17 | End: 2019-12-29

## 2019-01-17 ASSESSMENT — PAIN SCALES - GENERAL: PAINLEVEL: NO PAIN (0)

## 2019-01-17 ASSESSMENT — MIFFLIN-ST. JEOR: SCORE: 1754.05

## 2019-01-17 NOTE — PROGRESS NOTES

## 2019-01-17 NOTE — LETTER
My Asthma Action Plan  Name: Kat Rodriguez   YOB: 2000  Date: 1/17/2019   My doctor: Dhruv rGaves MD   My clinic: [facility]        My Control Medicine: Beclomethasone (QVar) -  80 mcg 1 puff 2 times daily  My Rescue Medicine: Albuterol (Proair/Ventolin/Proventil) inhaler :  2 puffs every 4 hours as needed, or as directed below:   My Asthma Severity: mild persistent  Avoid your asthma triggers:   pollens  animal dander            GREEN ZONE   Good Control    I feel good    No cough or wheeze    Can work, sleep and play without asthma symptoms       Take your asthma control medicine every day.     1. If exercise triggers your asthma, take your rescue medication    15 minutes before exercise or sports, and    During exercise if you have asthma symptoms  2. Spacer to use with inhaler: If you have a spacer, make sure to use it with your inhaler             YELLOW ZONE Getting Worse  I have ANY of these:    I do not feel good    Cough or wheeze    Chest feels tight    Wake up at night   1. Keep taking your Green Zone medications  2. Start taking your rescue medicine:    every 20 minutes for up to 1 hour. Then every 4 hours for 24-48 hours.  3. If you stay in the Yellow Zone for more than 12-24 hours, contact your doctor.  4. If you do not return to the Green Zone in 12-24 hours or you get worse, start taking your oral steroid medicine if prescribed by your provider.           RED ZONE Medical Alert - Get Help  I have ANY of these:    I feel awful    Medicine is not helping    Breathing getting harder    Trouble walking or talking    Nose opens wide to breathe       1. Take your rescue medicine NOW  2. If your provider has prescribed an oral steroid medicine, start taking it NOW  3. Call your doctor NOW  4. If you are still in the Red Zone after 20 minutes and you have not reached your doctor:    Take your rescue medicine again and    Call 911 or go to the emergency room right  away    See your regular doctor within 2 weeks of an Emergency Room or Urgent Care visit for follow-up treatment.          Annual Reminders:  Meet with Asthma Educator,  Flu Shot in the Fall, consider Pneumonia Vaccination for patients with asthma (aged 19 and older).    Pharmacy:    [facility] [place], [place] - [address] [facility] [place], [place] - [address] [facility] [address]                      Asthma Triggers  How To Control Things That Make Your Asthma Worse    Triggers are things that make your asthma worse.  Look at the list below to help you find your triggers and what you can do about them.  You can help prevent asthma flare-ups by staying away from your triggers.      Trigger                                                          What you can do   Cigarette Smoke  Tobacco smoke can make asthma worse. Do not allow smoking in your home, car or around you.  Be sure no one smokes at a child s day care or school.  If you smoke, ask your health care provider for ways to help you quit.  Ask family members to quit too.  Ask your health care provider for a referral to Quit Plan to help you quit smoking, or call 4-635-164-PLAN.     Colds, Flu, Bronchitis  These are common triggers of asthma. Wash your hands often.  Don t touch your eyes, nose or mouth.  Get a flu shot every year.     Dust Mites  These are tiny bugs that live in cloth or carpet. They are too small to see. Wash sheets and blankets in hot water every week.   Encase pillows and mattress in dust mite proof covers.  Avoid having carpet if you can. If you have carpet, vacuum weekly.   Use a dust mask and HEPA vacuum.   Pollen and Outdoor Mold  Some people are allergic to trees, grass, or weed pollen, or molds. Try to keep your windows closed.  Limit time out doors when pollen count is high.   Ask you health care provider about taking medicine during allergy season.     Animal Dander  Some  people are allergic to skin flakes, urine or saliva from pets with fur or feathers. Keep pets with fur or feathers out of your home.    If you can t keep the pet outdoors, then keep the pet out of your bedroom.  Keep the bedroom door closed.  Keep pets off cloth furniture and away from stuffed toys.     Mice, Rats, and Cockroaches  Some people are allergic to the waste from these pests.   Cover food and garbage.  Clean up spills and food crumbs.  Store grease in the refrigerator.   Keep food out of the bedroom.   Indoor Mold  This can be a trigger if your home has high moisture. Fix leaking faucets, pipes, or other sources of water.   Clean moldy surfaces.  Dehumidify basement if it is damp and smelly.   Smoke, Strong Odors, and Sprays  These can reduce air quality. Stay away from strong odors and sprays, such as perfume, powder, hair spray, paints, smoke incense, paint, cleaning products, candles and new carpet.   Exercise or Sports  Some people with asthma have this trigger. Be active!  Ask your doctor about taking medicine before sports or exercise to prevent symptoms.    Warm up for 5-10 minutes before and after sports or exercise.     Other Triggers of Asthma  Cold air:  Cover your nose and mouth with a scarf.  Sometimes laughing or crying can be a trigger.  Some medicines and food can trigger asthma.

## 2019-01-17 NOTE — PROGRESS NOTES
"  SUBJECTIVE:   Kat Rodriguez is a 18 year old female who presents to clinic today for the following health issues:    Asthma Follow-Up    Was ACT completed today?    Yes    ACT Total Scores 1/17/2019   ACT TOTAL SCORE -   ASTHMA ER VISITS -   ASTHMA HOSPITALIZATIONS -   ACT TOTAL SCORE (Goal Greater than or Equal to 20) 18   In the past 12 months, how many times did you visit the emergency room for your asthma without being admitted to the hospital? 0   In the past 12 months, how many times were you hospitalized overnight because of your asthma? 0       Recent asthma triggers that patient is dealing with: pollens and animal dander  Recently ran out of her medications so it hasn't been as controlled as usual. She feels that it is a mixture of problems with her asthma and allergies. She is not currently on a controller medication. She is not currently taking anything for her allergies daily.         Amount of exercise or physical activity: 2-3 days/week for an average of greater than 60 minutes    Problems taking medications regularly: No    Medication side effects: none    Diet: regular (no restrictions)      Past medical, family, and social histories, medications, and allergies are reviewed and updated in Epic.     ROS:  WEIGHT: She   Constitutional, HEENT, cardiovascular, pulmonary, GI and  systems are negative, except as otherwise noted.    This document serves as a record of the services and decisions personally performed and made by Dr. Graves. It was created on his behalf by Emily Obando, a trained medical scribe. The creation of this document is based the provider's statements to the medical scribe.  Emily Obando January 17, 2019 3:40 PM     OBJECTIVE:                                                    /64 (BP Location: Left arm, Patient Position: Chair, Cuff Size: Adult Large)   Pulse 69   Temp 97.9  F (36.6  C) (Oral)   Ht 1.562 m (5' 1.5\")   Wt 103.9 kg (229 lb)   SpO2 96%   BMI 42.57 kg/m   "   Body mass index is 42.57 kg/m .   GENERAL: healthy, alert and no distress, obese  EYES: Eyes grossly normal to inspection, PERRL, EOMI, sclerae white and conjunctivae normal  RESP: lungs clear to auscultation - no crackles or wheezes, no areas of dullness, no tachypnea  CV: Heart regular rate and rhythm without murmur, click or rub. No peripheral edema and peripheral pulses strong  MS: no gross musculoskeletal defects noted, no edema  SKIN: no suspicious lesions or rashes  NEURO: Normal strength and tone, sensory exam grossly normal, mentation intact, oriented times 3 and cranial nerves 2-12 intact  PSYCH: mentation appears normal, affect normal/bright     ASSESSMENT/PLAN:                                                      (J45.30) Mild persistent asthma without complication  (primary encounter diagnosis)  Comment: Not controlled at this time, and she has not been using a controller med (flovent prescribed 2 years ago).   Plan: beclomethasone HFA (QVAR REDIHALER) 80 MCG/ACT         inhaler, albuterol (PROAIR HFA) 108 (90 Base)         MCG/ACT inhaler, Asthma Action Plan (AAP)        Paper copy of ACT given to patient to allow for ACT update in 5 weeks. If she is well controlled she can follow up in clinic in mid-July.     (Z23) Need for prophylactic vaccination and inoculation against influenza  Comment: Influenza vaccine offered and accepted by patient. She has received it before without problems.   Plan: HC FLU VAC PRESRV FREE QUAD SPLIT VIR 3+YRS IM            The information in this document, created by the medical scribe for me, accurately reflects the services I personally performed and the decisions made by me. I have reviewed and approved this document for accuracy prior to leaving the patient care area. January 17, 2019 3:40 PM     Dhruv Graves MD

## 2019-01-17 NOTE — PATIENT INSTRUCTIONS
Someone will call or send a [employer] message to ask you to complete another ACT in about 5 weeks. You should take an allergy medication daily to better control your allergies. .       ================================================================================  Normal Values   Blood pressure  <140/90 for most adults    <130/80 for some chronic diseases (ask your care team about yours)    BMI (body mass index)  18.5-25 kg/m2 (based on height and weight)     Thank you for visiting [facility]    Normal or non-critical lab and imaging results will be communicated to you by MyChart, letter or phone within 7 days.  If you do not hear from us within 10 days, please call the clinic. If you have a critical or abnormal lab result, we will notify you by phone as soon as possible.     If you have any questions regarding your visit please contact:     Team Comfort:   Clinic Hours Telephone Number   Dr. Dhruv Duong Dr. Vocal 7am-5pm  Monday - Friday [phone]  Rosalie Monroe RN   Pharmacy 8:00am-8pm Monday-Friday    9am-5pm Saturday-Sunday [phone]   Urgent Care 11am-9pm Monday-Friday        9am-5pm Saturday-Sunday [phone]     After hours, weekend or if you need to make an appointment with your primary provider please call [phone].   After Hours nurse advise: call [facility]: [phone]    Medication Refills:  Call your pharmacy and they will forward the refill to us. Please allow 3 business days for your refills to be completed.

## 2019-01-18 ASSESSMENT — ASTHMA QUESTIONNAIRES: ACT_TOTALSCORE: 18

## 2019-02-21 ENCOUNTER — TELEPHONE (OUTPATIENT)
Dept: FAMILY MEDICINE | Facility: CLINIC | Age: 19
End: 2019-02-21

## 2019-02-21 NOTE — LETTER
March 12, 2019      Kat Rodriguez  [address] [place] [id number]        Dear Kat Rodriguez,      At [facility] we care about your health and are committed to providing quality patient care. It has come to our attention that you are due for an Asthma Control Test.     This screening tool helps us to assess how well your asthma is controlled. Good asthma control leads to less asthma symptoms and greater health. If your asthma is not in good control (score less than 20) it is recommended you be seen by your provider for medication and lifestyle adjustments    We have enclosed an  Asthma Control Test. Please complete the enclosed asthma control test even if you are feeling well. You can mail it back to our clinic or drop if off at our .     Thank you for your time and we hope this letter finds you well!      Sincerely,    Your Team at [facility]/

## 2019-02-21 NOTE — TELEPHONE ENCOUNTER
----- Message from Dhruv Graves MD sent at 1/17/2019  3:55 PM CST -----  Regarding: Asthma  Please update ACT. Schedule follow-up in near future if les than 20. Schedule f/u in mid July if pass.

## 2019-03-04 NOTE — TELEPHONE ENCOUNTER
No ACT received back from "[employer].    Copy of ACT mailed to patient, will reach out in 5 days.      JEFF Wang MA

## 2019-03-12 NOTE — TELEPHONE ENCOUNTER
Called and spoke with patient. She states she hasn't receive ACT in mail. Would like a new one mail again to patient.  ACT mailed with stamped envelope.    Karuna Mcgill MA

## 2019-03-29 NOTE — TELEPHONE ENCOUNTER
Spoke to patient, she stated that she was out and didn't time to complete her ACT. Informed patient to call us when she had time.    ALANNA Zaman MA

## 2019-11-03 ENCOUNTER — HEALTH MAINTENANCE LETTER (OUTPATIENT)
Age: 19
End: 2019-11-03

## 2019-11-06 ENCOUNTER — DOCUMENTATION ONLY (OUTPATIENT)
Dept: FAMILY MEDICINE | Facility: CLINIC | Age: 19
End: 2019-11-06

## 2019-11-06 ENCOUNTER — OFFICE VISIT (OUTPATIENT)
Dept: FAMILY MEDICINE | Facility: CLINIC | Age: 19
End: 2019-11-06
Payer: COMMERCIAL

## 2019-11-06 VITALS
HEART RATE: 76 BPM | TEMPERATURE: 97.5 F | HEIGHT: 62 IN | RESPIRATION RATE: 18 BRPM | SYSTOLIC BLOOD PRESSURE: 114 MMHG | OXYGEN SATURATION: 96 % | DIASTOLIC BLOOD PRESSURE: 74 MMHG | WEIGHT: 221.2 LBS | BODY MASS INDEX: 40.7 KG/M2

## 2019-11-06 DIAGNOSIS — E66.01 MORBID OBESITY WITH BMI OF 45.0-49.9, ADULT (H): ICD-10-CM

## 2019-11-06 DIAGNOSIS — R07.81 COSTAL MARGIN PAIN: ICD-10-CM

## 2019-11-06 DIAGNOSIS — R10.11 RUQ ABDOMINAL PAIN: Primary | ICD-10-CM

## 2019-11-06 DIAGNOSIS — Z28.21 INFLUENZA VACCINE REFUSED: ICD-10-CM

## 2019-11-06 PROCEDURE — 99213 OFFICE O/P EST LOW 20 MIN: CPT | Performed by: PREVENTIVE MEDICINE

## 2019-11-06 RX ORDER — NAPROXEN 500 MG/1
500 TABLET ORAL 2 TIMES DAILY WITH MEALS
Qty: 20 TABLET | Refills: 0 | Status: SHIPPED | OUTPATIENT
Start: 2019-11-06 | End: 2023-05-24

## 2019-11-06 RX ORDER — METHOCARBAMOL 500 MG/1
500-1000 TABLET, FILM COATED ORAL
Qty: 20 TABLET | Refills: 0 | Status: SHIPPED | OUTPATIENT
Start: 2019-11-06 | End: 2023-05-24

## 2019-11-06 ASSESSMENT — PAIN SCALES - GENERAL: PAINLEVEL: NO PAIN (0)

## 2019-11-06 ASSESSMENT — MIFFLIN-ST. JEOR: SCORE: 1718.67

## 2019-11-06 NOTE — PROGRESS NOTES
Subjective     Kat Rodriguez is a 18 year old female who presents to clinic today for the following health issues:    HPI   ABDOMINAL   PAIN     Onset: a week ago    Description:   Character: Sharp  Location: upper RT under the rib cage  Radiation: None    Intensity: mild, moderate    Progression of Symptoms:  intermittent    Accompanying Signs & Symptoms:  Fever/Chills?: no   Gas/Bloating: no   Nausea: no   Vomitting: no   Diarrhea?: no   Constipation:no   Dysuria or Hematuria: no    History:   Trauma: no   Previous similar pain: no    Previous tests done: none    Precipitating factors:   Does the pain change with:     Food: no      BM: no     Urination: no     Alleviating factors:  Ibuprofen helps    Therapies Tried and outcome: Ibuprofen helps    LMP:  10-15-19     Started suddenly  Only happens with physical activity  More with breathing  No falls  No trauma  No rash  No fever or chills  No bruising  No coughing   No shortness of breath   No emesis  No diarrhea    Patient Active Problem List   Diagnosis     Allergy to peanuts     Mild persistent asthma     Learning problem?     Irregular menses     Vitamin D deficiency disease     Medial meniscus tear     Tear of lateral cartilage or meniscus of knee, current     Sprain of MCL (medial collateral ligament) of knee     Morbid obesity with BMI of 45.0-49.9, adult (H)     Past Surgical History:   Procedure Laterality Date     ARTHROPLASTY REVISION KNEE Left 03/2018    ACL repair     ARTHROSCOPIC RECONSTRUCTION ANTERIOR CRUCIATE LIGAMENT Right 1/29/2015    Procedure: ARTHROSCOPIC RECONSTRUCTION ANTERIOR CRUCIATE LIGAMENT;  Surgeon: Emile Menendez MD;  Location:  OR       Social History     Tobacco Use     Smoking status: Passive Smoke Exposure - Never Smoker     Smokeless tobacco: Never Used     Tobacco comment: father smokes   Substance Use Topics     Alcohol use: No     Family History   Problem Relation Age of Onset     Obesity Mother      Hypertension  "Maternal Grandmother      Hyperlipidemia Maternal Grandmother      Obesity Maternal Grandfather          Current Outpatient Medications   Medication Sig Dispense Refill     albuterol (PROAIR HFA) 108 (90 Base) MCG/ACT inhaler Inhale 2 puffs into the lungs every 4 hours as needed for asthma symptoms. 2 Inhaler 1     beclomethasone HFA (QVAR REDIHALER) 80 MCG/ACT inhaler Inhale 1 puff into the lungs 2 times daily for asthma prevention. 3 Inhaler 1     cetirizine (ZYRTEC) 10 MG tablet Take 1 tablet (10 mg) by mouth daily as needed for allergies 365 tablet prn     EPINEPHrine (EPIPEN) 0.3 MG/0.3ML injection Inject 0.3 mLs into the muscle once as needed for anaphylaxis. 2 each 3     methocarbamol (ROBAXIN) 500 MG tablet Take 1-2 tablets (500-1,000 mg) by mouth nightly as needed for muscle spasms 20 tablet 0     naproxen (NAPROSYN) 500 MG tablet Take 1 tablet (500 mg) by mouth 2 times daily (with meals) 20 tablet 0     Spacer/Aero Chamber Mouthpiece MISC Use with albuterol and flovent 2 each 0     Allergies   Allergen Reactions     Cats Itching     Fish      Nuts      Tree nuts, not peanuts     Trees Swelling     BP Readings from Last 3 Encounters:   11/06/19 114/74   01/17/19 102/64   09/26/18 121/80    Wt Readings from Last 3 Encounters:   11/06/19 100.3 kg (221 lb 3.2 oz)   01/17/19 103.9 kg (229 lb)   09/26/18 111.8 kg (246 lb 6.4 oz) (>99 %)*     * Growth percentiles are based on CDC (Girls, 2-20 Years) data.                 Reviewed and updated as needed this visit by Provider  Tobacco  Allergies  Meds  Problems  Med Hx  Surg Hx  Fam Hx  Soc Hx          Review of Systems   ROS COMP: Constitutional, HEENT, cardiovascular, pulmonary, gi and gu systems are negative, except as otherwise noted.      Objective    /74 (BP Location: Right arm, Patient Position: Sitting, Cuff Size: Adult Large)   Pulse 76   Temp 97.5  F (36.4  C) (Oral)   Resp 18   Ht 1.562 m (5' 1.5\")   Wt 100.3 kg (221 lb 3.2 oz)   LMP " "10/15/2019 (Approximate)   SpO2 96%   BMI 41.12 kg/m    Body mass index is 41.12 kg/m .  Physical Exam   GENERAL APPEARANCE: healthy, alert and no distress  EYES: Eyes grossly normal to inspection and conjunctivae and sclerae normal  NECK: no adenopathy and trachea midline and normal to palpation  RESP: lungs clear to auscultation - no rales, rhonchi or wheezes  CV: regular rates and rhythm, normal S1 S2, no S3 or S4 and no murmur, click or rub  ABDOMEN: soft, non-tender and no rebound or guarding.  MS: extremities normal- no gross deformities noted and peripheral pulses normal  SKIN: no suspicious lesions or rashes  NEURO: Normal strength and tone, mentation intact and speech normal  PSYCH: mentation appears normal      Diagnostic Test Results:  Labs reviewed in Epic  No results found for this or any previous visit (from the past 24 hour(s)).        Assessment & Plan     Kat was seen today for abdominal pain.    Diagnoses and all orders for this visit:    RUQ abdominal pain  -     CBC with platelets differential  -     Comprehensive metabolic panel    Costal margin pain  -     naproxen (NAPROSYN) 500 MG tablet; Take 1 tablet (500 mg) by mouth 2 times daily (with meals)  -     methocarbamol (ROBAXIN) 500 MG tablet; Take 1-2 tablets (500-1,000 mg) by mouth nightly as needed for muscle spasms  -Symptoms are more along the costal margin and less so the RUQ  -await labs results  -ER precautions reviewed in detail. If increased abdominal pain, fever over 101 F, emesis, rectal bleeding, melena, then needs to be seen in ER, patient expressed comprehension of this.   -Heat application   -sedation side effect of medication reviewed     Influenza vaccine refused    Morbid obesity with BMI of 45.0-49.9, adult (H)  -weight loss since last visit        BMI:   Estimated body mass index is 41.12 kg/m  as calculated from the following:    Height as of this encounter: 1.562 m (5' 1.5\").    Weight as of this encounter: 100.3 kg " (221 lb 3.2 oz).     Return in about 1 week (around 11/13/2019), or if symptoms worsen or fail to improve.    Leigh Mendez MD MPH    [facility]

## 2019-11-07 ASSESSMENT — ASTHMA QUESTIONNAIRES: ACT_TOTALSCORE: 20

## 2019-11-07 NOTE — PROGRESS NOTES
Patient did not report to Lab on 11/6/2019 to complete testing, original orders were cancelled.  Please review pended orders, complete necessary items, and sign.  If testing is not needed, please delete order.  Please contact patient with follow-up instructions as needed.  Thank you.

## 2019-12-18 ENCOUNTER — MYC MEDICAL ADVICE (OUTPATIENT)
Dept: FAMILY MEDICINE | Facility: CLINIC | Age: 19
End: 2019-12-18

## 2019-12-29 ENCOUNTER — OFFICE VISIT (OUTPATIENT)
Dept: URGENT CARE | Facility: URGENT CARE | Age: 19
End: 2019-12-29
Payer: COMMERCIAL

## 2019-12-29 VITALS
HEART RATE: 77 BPM | BODY MASS INDEX: 40.9 KG/M2 | TEMPERATURE: 98.1 F | RESPIRATION RATE: 18 BRPM | WEIGHT: 220 LBS | OXYGEN SATURATION: 98 % | SYSTOLIC BLOOD PRESSURE: 111 MMHG | DIASTOLIC BLOOD PRESSURE: 76 MMHG

## 2019-12-29 DIAGNOSIS — J30.81 CHRONIC ALLERGIC RHINITIS DUE TO ANIMAL HAIR AND DANDER: ICD-10-CM

## 2019-12-29 DIAGNOSIS — R09.81 CONGESTION OF PARANASAL SINUS: ICD-10-CM

## 2019-12-29 DIAGNOSIS — J45.20 MILD INTERMITTENT ASTHMA, UNSPECIFIED WHETHER COMPLICATED: ICD-10-CM

## 2019-12-29 DIAGNOSIS — J45.30 MILD PERSISTENT ASTHMA WITHOUT COMPLICATION: ICD-10-CM

## 2019-12-29 DIAGNOSIS — R07.0 THROAT PAIN: ICD-10-CM

## 2019-12-29 DIAGNOSIS — H65.91 OME (OTITIS MEDIA WITH EFFUSION), RIGHT: Primary | ICD-10-CM

## 2019-12-29 LAB
DEPRECATED S PYO AG THROAT QL EIA: NORMAL
SPECIMEN SOURCE: NORMAL

## 2019-12-29 PROCEDURE — 87081 CULTURE SCREEN ONLY: CPT | Performed by: FAMILY MEDICINE

## 2019-12-29 PROCEDURE — 87880 STREP A ASSAY W/OPTIC: CPT | Performed by: FAMILY MEDICINE

## 2019-12-29 PROCEDURE — 99213 OFFICE O/P EST LOW 20 MIN: CPT | Performed by: FAMILY MEDICINE

## 2019-12-29 RX ORDER — CETIRIZINE HYDROCHLORIDE 10 MG/1
10 TABLET ORAL DAILY PRN
Qty: 30 TABLET | Refills: 0 | Status: SHIPPED | OUTPATIENT
Start: 2019-12-29 | End: 2020-02-27

## 2019-12-29 RX ORDER — FLUTICASONE PROPIONATE 50 MCG
1 SPRAY, SUSPENSION (ML) NASAL
Qty: 1 G | Refills: 0 | Status: SHIPPED | OUTPATIENT
Start: 2019-12-29 | End: 2020-02-27

## 2019-12-29 RX ORDER — ALBUTEROL SULFATE 90 UG/1
2 AEROSOL, METERED RESPIRATORY (INHALATION) EVERY 4 HOURS PRN
Qty: 2 INHALER | Refills: 1 | Status: SHIPPED | OUTPATIENT
Start: 2019-12-29 | End: 2020-03-03

## 2019-12-29 RX ORDER — AMOXICILLIN 875 MG
875 TABLET ORAL 2 TIMES DAILY
Qty: 20 TABLET | Refills: 0 | Status: SHIPPED | OUTPATIENT
Start: 2019-12-29 | End: 2020-02-27

## 2019-12-29 NOTE — PROGRESS NOTES
Subjective     Kat Rodriguez is a 19 year old female who presents to clinic today for the following health issues:    HPI   Patient comes with 1 week history of sinus congestion, runny nose, pressure and fullness in the ears.  She does have history of asthma which been well controlled.  She does use her albuterol inhaler as needed.    She reports initially has fever she has postnasal drainage, sore throat worse at night.  Denies any difficulty breathing, denies wheezing, denies short of breath.    Has no diarrhea, no headache, no vomiting no history of travel.  Patient does not smoke  Patient Active Problem List   Diagnosis     Allergy to peanuts     Mild persistent asthma     Learning problem?     Irregular menses     Vitamin D deficiency disease     Medial meniscus tear     Tear of lateral cartilage or meniscus of knee, current     Sprain of MCL (medial collateral ligament) of knee     Morbid obesity with BMI of 45.0-49.9, adult (H)     Past Surgical History:   Procedure Laterality Date     ARTHROPLASTY REVISION KNEE Left 03/2018    ACL repair     ARTHROSCOPIC RECONSTRUCTION ANTERIOR CRUCIATE LIGAMENT Right 1/29/2015    Procedure: ARTHROSCOPIC RECONSTRUCTION ANTERIOR CRUCIATE LIGAMENT;  Surgeon: Emile Menendez MD;  Location:  OR       Social History     Tobacco Use     Smoking status: Passive Smoke Exposure - Never Smoker     Smokeless tobacco: Never Used     Tobacco comment: father smokes   Substance Use Topics     Alcohol use: No     Family History   Problem Relation Age of Onset     Obesity Mother      Hypertension Maternal Grandmother      Hyperlipidemia Maternal Grandmother      Obesity Maternal Grandfather          Current Outpatient Medications   Medication Sig Dispense Refill     albuterol (PROAIR HFA) 108 (90 Base) MCG/ACT inhaler Inhale 2 puffs into the lungs every 4 hours as needed for asthma symptoms. 2 Inhaler 1     amoxicillin (AMOXIL) 875 MG tablet Take 1 tablet (875 mg) by mouth 2 times  daily for 10 days 20 tablet 0     cetirizine (ZYRTEC) 10 MG tablet Take 1 tablet (10 mg) by mouth daily as needed for allergies 30 tablet 0     EPINEPHrine (EPIPEN) 0.3 MG/0.3ML injection Inject 0.3 mLs into the muscle once as needed for anaphylaxis. 2 each 3     fluticasone (FLONASE) 50 MCG/ACT nasal spray Spray 1 spray into both nostrils nightly as needed for rhinitis or allergies 1 g 0     methocarbamol (ROBAXIN) 500 MG tablet Take 1-2 tablets (500-1,000 mg) by mouth nightly as needed for muscle spasms 20 tablet 0     naproxen (NAPROSYN) 500 MG tablet Take 1 tablet (500 mg) by mouth 2 times daily (with meals) 20 tablet 0     Spacer/Aero Chamber Mouthpiece MISC Use with albuterol and flovent 2 each 0     Allergies   Allergen Reactions     Cats Itching     Fish      Nuts      Tree nuts, not peanuts     Trees Swelling     Reviewed and updated as needed this visit by Provider         Review of Systems   ROS COMP: Constitutional, HEENT, cardiovascular, pulmonary, gi and gu systems are negative, except as otherwise noted.      Objective    /76 (BP Location: Left arm, Patient Position: Chair, Cuff Size: Adult Large)   Pulse 77   Temp 98.1  F (36.7  C) (Oral)   Resp 18   Wt 99.8 kg (220 lb)   SpO2 98%   BMI 40.90 kg/m    Body mass index is 40.9 kg/m .  Physical Exam   GENERAL: healthy, alert and no distress  HENT: right ear: erythematous and bulging membrane, left ear: normal: no effusions, no erythema, normal landmarks, nose and mouth without ulcers or lesions, oropharynx clear and oral mucous membranes moist  NECK: no adenopathy, no asymmetry, masses, or scars and thyroid normal to palpation  RESP: lungs clear to auscultation - no rales, rhonchi or wheezes  CV: regular rate and rhythm, normal S1 S2, no S3 or S4, no murmur, click or rub, no peripheral edema and peripheral pulses strong  ABDOMEN: soft, nontender, no hepatosplenomegaly, no masses and bowel sounds normal  MS: no gross musculoskeletal defects  noted, no edema    Diagnostic Test Results:  Labs reviewed in Epic  none         Assessment & Plan       ICD-10-CM    1. OME (otitis media with effusion), right H65.91 amoxicillin (AMOXIL) 875 MG tablet   2. Throat pain R07.0 Strep, Rapid Screen     Beta strep group A culture   3. Mild intermittent asthma, unspecified whether complicated J45.20 amoxicillin (AMOXIL) 875 MG tablet   4. Chronic allergic rhinitis due to animal hair and dander J30.81 cetirizine (ZYRTEC) 10 MG tablet     fluticasone (FLONASE) 50 MCG/ACT nasal spray   5. Mild persistent asthma without complication J45.30 albuterol (PROAIR HFA) 108 (90 Base) MCG/ACT inhaler   6. Congestion of paranasal sinus R09.81             There are no Patient Instructions on file for this visit.    No follow-ups on file.    Don Reed MD  [facility]

## 2019-12-30 LAB
BACTERIA SPEC CULT: NORMAL
SPECIMEN SOURCE: NORMAL

## 2020-02-10 ENCOUNTER — HEALTH MAINTENANCE LETTER (OUTPATIENT)
Age: 20
End: 2020-02-10

## 2020-02-27 ENCOUNTER — OFFICE VISIT (OUTPATIENT)
Dept: FAMILY MEDICINE | Facility: CLINIC | Age: 20
End: 2020-02-27
Payer: COMMERCIAL

## 2020-02-27 VITALS
HEART RATE: 81 BPM | OXYGEN SATURATION: 98 % | TEMPERATURE: 98.2 F | BODY MASS INDEX: 41.16 KG/M2 | DIASTOLIC BLOOD PRESSURE: 68 MMHG | RESPIRATION RATE: 20 BRPM | WEIGHT: 221.4 LBS | SYSTOLIC BLOOD PRESSURE: 112 MMHG

## 2020-02-27 DIAGNOSIS — Z91.010 ALLERGY TO PEANUTS: ICD-10-CM

## 2020-02-27 DIAGNOSIS — Z12.4 ENCOUNTER FOR SCREENING FOR CERVICAL CANCER: ICD-10-CM

## 2020-02-27 DIAGNOSIS — E55.9 VITAMIN D DEFICIENCY DISEASE: ICD-10-CM

## 2020-02-27 DIAGNOSIS — J30.81 CHRONIC ALLERGIC RHINITIS DUE TO ANIMAL HAIR AND DANDER: ICD-10-CM

## 2020-02-27 DIAGNOSIS — J45.30 MILD PERSISTENT ASTHMA WITHOUT COMPLICATION: Primary | ICD-10-CM

## 2020-02-27 DIAGNOSIS — Z11.4 SCREENING FOR HIV (HUMAN IMMUNODEFICIENCY VIRUS): ICD-10-CM

## 2020-02-27 DIAGNOSIS — N89.8 VAGINAL IRRITATION: ICD-10-CM

## 2020-02-27 DIAGNOSIS — N92.6 IRREGULAR MENSES: ICD-10-CM

## 2020-02-27 DIAGNOSIS — E66.01 MORBID OBESITY WITH BMI OF 45.0-49.9, ADULT (H): ICD-10-CM

## 2020-02-27 LAB
ALBUMIN SERPL-MCNC: 3.8 G/DL (ref 3.4–5)
ALBUMIN UR-MCNC: NEGATIVE MG/DL
ALP SERPL-CCNC: 82 U/L (ref 40–150)
ALT SERPL W P-5'-P-CCNC: 18 U/L (ref 0–50)
ANION GAP SERPL CALCULATED.3IONS-SCNC: 7 MMOL/L (ref 3–14)
APPEARANCE UR: ABNORMAL
AST SERPL W P-5'-P-CCNC: 14 U/L (ref 0–45)
BILIRUB SERPL-MCNC: 0.8 MG/DL (ref 0.2–1.3)
BILIRUB UR QL STRIP: NEGATIVE
BUN SERPL-MCNC: 18 MG/DL (ref 7–30)
CALCIUM SERPL-MCNC: 9.1 MG/DL (ref 8.5–10.1)
CHLORIDE SERPL-SCNC: 111 MMOL/L (ref 94–109)
CO2 SERPL-SCNC: 23 MMOL/L (ref 20–32)
COLOR UR AUTO: YELLOW
CREAT SERPL-MCNC: 0.82 MG/DL (ref 0.52–1.04)
ERYTHROCYTE [DISTWIDTH] IN BLOOD BY AUTOMATED COUNT: 12.3 % (ref 10–15)
GFR SERPL CREATININE-BSD FRML MDRD: >90 ML/MIN/{1.73_M2}
GLUCOSE SERPL-MCNC: 102 MG/DL (ref 70–99)
GLUCOSE UR STRIP-MCNC: NEGATIVE MG/DL
HBA1C MFR BLD: 5.1 % (ref 0–5.6)
HCG UR QL: NEGATIVE
HCT VFR BLD AUTO: 40.7 % (ref 35–47)
HGB BLD-MCNC: 13.1 G/DL (ref 11.7–15.7)
HGB UR QL STRIP: ABNORMAL
INSULIN SERPL-ACNC: 93.8 MU/L (ref 3–25)
KETONES UR STRIP-MCNC: NEGATIVE MG/DL
LEUKOCYTE ESTERASE UR QL STRIP: NEGATIVE
LH SERPL-ACNC: 2.6 IU/L
MCH RBC QN AUTO: 26.4 PG (ref 26.5–33)
MCHC RBC AUTO-ENTMCNC: 32.2 G/DL (ref 31.5–36.5)
MCV RBC AUTO: 82 FL (ref 78–100)
MUCOUS THREADS #/AREA URNS LPF: PRESENT /LPF
NITRATE UR QL: NEGATIVE
NON-SQ EPI CELLS #/AREA URNS LPF: ABNORMAL /LPF
PH UR STRIP: 6 PH (ref 5–7)
PLATELET # BLD AUTO: 324 10E9/L (ref 150–450)
POTASSIUM SERPL-SCNC: 4 MMOL/L (ref 3.4–5.3)
PROT SERPL-MCNC: 7.4 G/DL (ref 6.8–8.8)
RBC # BLD AUTO: 4.97 10E12/L (ref 3.8–5.2)
RBC #/AREA URNS AUTO: ABNORMAL /HPF
SODIUM SERPL-SCNC: 141 MMOL/L (ref 133–144)
SOURCE: ABNORMAL
SP GR UR STRIP: >1.03 (ref 1–1.03)
SPECIMEN SOURCE: NORMAL
UROBILINOGEN UR STRIP-ACNC: 0.2 EU/DL (ref 0.2–1)
WBC # BLD AUTO: 6.3 10E9/L (ref 4–11)
WBC #/AREA URNS AUTO: ABNORMAL /HPF
WET PREP SPEC: NORMAL

## 2020-02-27 PROCEDURE — 87491 CHLMYD TRACH DNA AMP PROBE: CPT | Performed by: NURSE PRACTITIONER

## 2020-02-27 PROCEDURE — 83525 ASSAY OF INSULIN: CPT | Performed by: NURSE PRACTITIONER

## 2020-02-27 PROCEDURE — G0145 SCR C/V CYTO,THINLAYER,RESCR: HCPCS | Performed by: NURSE PRACTITIONER

## 2020-02-27 PROCEDURE — 87210 SMEAR WET MOUNT SALINE/INK: CPT | Performed by: NURSE PRACTITIONER

## 2020-02-27 PROCEDURE — 80053 COMPREHEN METABOLIC PANEL: CPT | Performed by: NURSE PRACTITIONER

## 2020-02-27 PROCEDURE — 85027 COMPLETE CBC AUTOMATED: CPT | Performed by: NURSE PRACTITIONER

## 2020-02-27 PROCEDURE — 83002 ASSAY OF GONADOTROPIN (LH): CPT | Performed by: NURSE PRACTITIONER

## 2020-02-27 PROCEDURE — 84270 ASSAY OF SEX HORMONE GLOBUL: CPT | Performed by: NURSE PRACTITIONER

## 2020-02-27 PROCEDURE — 87591 N.GONORRHOEAE DNA AMP PROB: CPT | Performed by: NURSE PRACTITIONER

## 2020-02-27 PROCEDURE — 83498 ASY HYDROXYPROGESTERONE 17-D: CPT | Performed by: NURSE PRACTITIONER

## 2020-02-27 PROCEDURE — 82627 DEHYDROEPIANDROSTERONE: CPT | Performed by: NURSE PRACTITIONER

## 2020-02-27 PROCEDURE — 84403 ASSAY OF TOTAL TESTOSTERONE: CPT | Performed by: NURSE PRACTITIONER

## 2020-02-27 PROCEDURE — 83036 HEMOGLOBIN GLYCOSYLATED A1C: CPT | Performed by: NURSE PRACTITIONER

## 2020-02-27 PROCEDURE — 82306 VITAMIN D 25 HYDROXY: CPT | Performed by: NURSE PRACTITIONER

## 2020-02-27 PROCEDURE — 99215 OFFICE O/P EST HI 40 MIN: CPT | Performed by: NURSE PRACTITIONER

## 2020-02-27 PROCEDURE — 36415 COLL VENOUS BLD VENIPUNCTURE: CPT | Performed by: NURSE PRACTITIONER

## 2020-02-27 PROCEDURE — 81001 URINALYSIS AUTO W/SCOPE: CPT | Performed by: NURSE PRACTITIONER

## 2020-02-27 PROCEDURE — 81025 URINE PREGNANCY TEST: CPT | Performed by: NURSE PRACTITIONER

## 2020-02-27 RX ORDER — CETIRIZINE HYDROCHLORIDE 10 MG/1
10 TABLET ORAL DAILY PRN
Qty: 90 TABLET | Refills: 1 | Status: SHIPPED | OUTPATIENT
Start: 2020-02-27 | End: 2023-05-24

## 2020-02-27 RX ORDER — EPINEPHRINE 0.3 MG/.3ML
0.3 INJECTION SUBCUTANEOUS PRN
Qty: 1 EACH | Refills: 1 | Status: SHIPPED | OUTPATIENT
Start: 2020-02-27 | End: 2023-05-24

## 2020-02-27 RX ORDER — FLUTICASONE PROPIONATE 50 MCG
1 SPRAY, SUSPENSION (ML) NASAL
Qty: 1 G | Refills: 3 | Status: SHIPPED | OUTPATIENT
Start: 2020-02-27 | End: 2023-05-24

## 2020-02-27 ASSESSMENT — PAIN SCALES - GENERAL: PAINLEVEL: NO PAIN (0)

## 2020-02-27 NOTE — PATIENT INSTRUCTIONS
Patient Education     Preventing Vaginitis     Use mild, unscented soap when you bathe or shower to avoid irritating your vagina.    Vaginitis is irritation or infection of the vagina or vulva (the outside opening of the vagina). Vaginitis can be caused by bacteria, viruses, parasites, or yeast. Chemicals (such as in perfumes or soaps or in spermicides) can sometimes be a cause. Vaginitis can be caused by hormone changes in pregnancy or with menopause. You can help prevent vaginitis. Follow the tips below. And see your healthcare provider if you have any symptoms.  Hygiene    Avoid chemicals. Do not use vaginal sprays. Do not use scented toilet paper or tampons that are scented. Sprays and scents have chemicals that can irritate your vagina.    Do not douche unless you are told to by your healthcare provider. Douching is rarely needed. And it upsets the normal balance in the vagina.    Wash yourself well. Wash the outer vaginal area (vulva) every day with mild, unscented soap. Keep it as dry as possible.    Wipe correctly. Make sure to wipe from front to back after a bowel movement. This helps keep from spreading bacteria from your anus to your vagina.    Change your tampon often. During your period, make sure to change your tampon as often as directed on the package. This allows the normal flow of vaginal discharge and blood.  Lifestyle    Limit your number of sexual partners. The more partners you have, the greater your risk of infection. Using condoms helps reduce your risk.    Get enough sleep. Sleep helps keep your body s immune system healthy. This helps you fight infection.    Lose weight, if needed. Excess weight can reduce air circulation around your vagina. This can increase your risk of infection.    Exercise regularly. Regular activity helps keep your body healthy.    Take antibiotics only as directed. Antibiotics can change the normal chemical balance in the vagina.    Clothing    Don t sit in wet  clothes. Yeast thrives when it s warm and damp.    Don t wear tight pants. And don t wear tights, leggings, or hose without a cotton crotch. These types of clothing trap warmth and moisture.    Wear cotton underwear. Cotton lets air circulate around the vagina.  Symptoms of vaginitis    Irritation, swelling, or itching of the genital area    Vaginal discharge    Bad vaginal odor    Pain or burning during urination   Date Last Reviewed: 12/1/2016 2000-2019 [employer]. [address] [place] [address], [place] [address]. All rights reserved. This information is not intended as a substitute for professional medical care. Always follow your healthcare professional's instructions.

## 2020-02-27 NOTE — LETTER
My Asthma Action Plan    Name: Kat Rodriguez   YOB: 2000  Date: 2/27/2020   My doctor: PAUL Bianchi CNP   My clinic: [facility]        My Rescue Medicine:   Albuterol inhaler (Proair/Ventolin/Proventil HFA)  2-4 puffs EVERY 4 HOURS as needed. Use a spacer if recommended by your provider.   My Asthma Severity:   Intermittent / Exercise Induced  Know your asthma triggers: smoke, upper respiratory infections, pollens, humidity, exercise or sports and cold air  animal dander  strong odors and fumes          GREEN ZONE   Good Control    I feel good    No cough or wheeze    Can work, sleep and play without asthma symptoms       Take your asthma control medicine every day.     1. If exercise triggers your asthma, take your rescue medication    15 minutes before exercise or sports, and    During exercise if you have asthma symptoms  2. Spacer to use with inhaler: If you have a spacer, make sure to use it with your inhaler             YELLOW ZONE Getting Worse  I have ANY of these:    I do not feel good    Cough or wheeze    Chest feels tight    Wake up at night   1. Keep taking your Green Zone medications  2. Start taking your rescue medicine:    every 20 minutes for up to 1 hour. Then every 4 hours for 24-48 hours.  3. If you stay in the Yellow Zone for more than 12-24 hours, contact your doctor.  4. If you do not return to the Green Zone in 12-24 hours or you get worse, start taking your oral steroid medicine if prescribed by your provider.           RED ZONE Medical Alert - Get Help  I have ANY of these:    I feel awful    Medicine is not helping    Breathing getting harder    Trouble walking or talking    Nose opens wide to breathe       1. Take your rescue medicine NOW  2. If your provider has prescribed an oral steroid medicine, start taking it NOW  3. Call your doctor NOW  4. If you are still in the Red Zone after 20 minutes and you have not reached your doctor:    Take  your rescue medicine again and    Call 911 or go to the emergency room right away    See your regular doctor within 2 weeks of an Emergency Room or Urgent Care visit for follow-up treatment.          Annual Reminders:  Meet with Asthma Educator,  Flu Shot in the Fall, consider Pneumonia Vaccination for patients with asthma (aged 19 and older).    Pharmacy:    [facility] [place] - [address] [id number] - [place] - [address] AT [facility] [address]    Electronically signed by PAUL Bianchi CNP   Date: 02/27/20                    Asthma Triggers  How To Control Things That Make Your Asthma Worse    Triggers are things that make your asthma worse.  Look at the list below to help you find your triggers and   what you can do about them. You can help prevent asthma flare-ups by staying away from your triggers.      Trigger                                                          What you can do   Cigarette Smoke  Tobacco smoke can make asthma worse. Do not allow smoking in your home, car or around you.  Be sure no one smokes at a child s day care or school.  If you smoke, ask your health care provider for ways to help you quit.  Ask family members to quit too.  Ask your health care provider for a referral to Quit Plan to help you quit smoking, or call 0-389-757-PLAN.     Colds, Flu, Bronchitis  These are common triggers of asthma. Wash your hands often.  Don t touch your eyes, nose or mouth.  Get a flu shot every year.     Dust Mites  These are tiny bugs that live in cloth or carpet. They are too small to see. Wash sheets and blankets in hot water every week.   Encase pillows and mattress in dust mite proof covers.  Avoid having carpet if you can. If you have carpet, vacuum weekly.   Use a dust mask and HEPA vacuum.   Pollen and Outdoor Mold  Some people are allergic to trees, grass, or weed pollen, or molds. Try to keep your windows  closed.  Limit time out doors when pollen count is high.   Ask you health care provider about taking medicine during allergy season.     Animal Dander  Some people are allergic to skin flakes, urine or saliva from pets with fur or feathers. Keep pets with fur or feathers out of your home.    If you can t keep the pet outdoors, then keep the pet out of your bedroom.  Keep the bedroom door closed.  Keep pets off cloth furniture and away from stuffed toys.     Mice, Rats, and Cockroaches  Some people are allergic to the waste from these pests.   Cover food and garbage.  Clean up spills and food crumbs.  Store grease in the refrigerator.   Keep food out of the bedroom.   Indoor Mold  This can be a trigger if your home has high moisture. Fix leaking faucets, pipes, or other sources of water.   Clean moldy surfaces.  Dehumidify basement if it is damp and smelly.   Smoke, Strong Odors, and Sprays  These can reduce air quality. Stay away from strong odors and sprays, such as perfume, powder, hair spray, paints, smoke incense, paint, cleaning products, candles and new carpet.   Exercise or Sports  Some people with asthma have this trigger. Be active!  Ask your doctor about taking medicine before sports or exercise to prevent symptoms.    Warm up for 5-10 minutes before and after sports or exercise.     Other Triggers of Asthma  Cold air:  Cover your nose and mouth with a scarf.  Sometimes laughing or crying can be a trigger.  Some medicines and food can trigger asthma.

## 2020-02-27 NOTE — PROGRESS NOTES
Subjective     Kat Rodriguez is a 19 year old female who presents to clinic today for the following health issues:    HPI     Asthma Follow-Up    Was ACT completed today?    Yes    ACT Total Scores 2020   ACT TOTAL SCORE -   ASTHMA ER VISITS -   ASTHMA HOSPITALIZATIONS -   ACT TOTAL SCORE (Goal Greater than or Equal to 20) 19   In the past 12 months, how many times did you visit the emergency room for your asthma without being admitted to the hospital? 0   In the past 12 months, how many times were you hospitalized overnight because of your asthma? 0       How many days per week do you miss taking your asthma controller medication?  0    Please describe any recent triggers for your asthma: animal dander and strong odors and fumes    Have you had any Emergency Room Visits, Urgent Care Visits, or Hospital Admissions since your last office visit?  No  Her allergies worsen her asthma symptoms- needs refill of Flonase and Zyrtec.    Needs refill of Epipen for peanut allergy as her pen is ..      How many servings of fruits and vegetables do you eat daily?  0-1    On average, how many sweetened beverages do you drink each day (Examples: soda, juice, sweet tea, etc.  Do NOT count diet or artificially sweetened beverages)?   0    How many days per week do you exercise enough to make your heart beat faster? 3 or less    How many minutes a day do you exercise enough to make your heart beat faster? 9 or less    How many days per week do you miss taking your medication? 0      Vaginal Symptoms  Onset: a few months back, maybe 4-5 months ago- but yesterday was when it got really bad    Description:  Vaginal Discharge: none   Itching (Pruritis): YES  Burning sensation:  YES  Odor: no     Accompanying Signs & Symptoms:  Pain with Urination: YES  Abdominal Pain: no   Fever: no     History:   Sexually active: no   New Partner: no   Possibility of Pregnancy:  No    Precipitating factors:   Recent Antibiotic Use: no      Alleviating factors:  none    Therapies Tried and outcome: none    LAST MENSTUAL PERIOD 2-3 months ago, menses first occurred at age 11 and she has always had irregular menses, gets period 3-4 times/year.    Concern - Weight concern       Description: Would like to discuss weight issues and medications to help lose weight.    She has tried several diets, none of which worked well for her.  She has difficulty adhering to a diet, consumes excessive carbohydrates.  She has  Been exercising more regularly, however. She was seen by Bariatrics in 2017 but does not want surgery.    Patient Active Problem List   Diagnosis     Allergy to peanuts     Mild persistent asthma     Learning problem?     Irregular menses     Vitamin D deficiency disease     Medial meniscus tear     Tear of lateral cartilage or meniscus of knee, current     Sprain of MCL (medial collateral ligament) of knee     Morbid obesity with BMI of 45.0-49.9, adult (H)     Past Surgical History:   Procedure Laterality Date     ARTHROPLASTY REVISION KNEE Left 03/2018    ACL repair     ARTHROSCOPIC RECONSTRUCTION ANTERIOR CRUCIATE LIGAMENT Right 1/29/2015    Procedure: ARTHROSCOPIC RECONSTRUCTION ANTERIOR CRUCIATE LIGAMENT;  Surgeon: Emile Menendez MD;  Location:  OR       Social History     Tobacco Use     Smoking status: Passive Smoke Exposure - Never Smoker     Smokeless tobacco: Never Used     Tobacco comment: father smokes   Substance Use Topics     Alcohol use: No     Family History   Problem Relation Age of Onset     Obesity Mother      Hypertension Maternal Grandmother      Hyperlipidemia Maternal Grandmother      Obesity Maternal Grandfather          Current Outpatient Medications   Medication Sig Dispense Refill     albuterol (PROAIR HFA) 108 (90 Base) MCG/ACT inhaler Inhale 2 puffs into the lungs every 4 hours as needed for asthma symptoms. 2 Inhaler 1     cetirizine (ZYRTEC) 10 MG tablet Take 1 tablet (10 mg) by mouth daily as needed for  allergies 30 tablet 0     EPINEPHrine (EPIPEN) 0.3 MG/0.3ML injection Inject 0.3 mLs into the muscle once as needed for anaphylaxis. 2 each 3     fluticasone (FLONASE) 50 MCG/ACT nasal spray Spray 1 spray into both nostrils nightly as needed for rhinitis or allergies 1 g 0     methocarbamol (ROBAXIN) 500 MG tablet Take 1-2 tablets (500-1,000 mg) by mouth nightly as needed for muscle spasms 20 tablet 0     naproxen (NAPROSYN) 500 MG tablet Take 1 tablet (500 mg) by mouth 2 times daily (with meals) 20 tablet 0     Spacer/Aero Chamber Mouthpiece MISC Use with albuterol and flovent 2 each 0     BP Readings from Last 3 Encounters:   02/27/20 112/68   12/29/19 111/76   11/06/19 114/74    Wt Readings from Last 3 Encounters:   02/27/20 100.4 kg (221 lb 6.4 oz)   12/29/19 99.8 kg (220 lb)   11/06/19 100.3 kg (221 lb 3.2 oz)            Reviewed and updated as needed this visit by Provider         Review of Systems   ROS COMP: Constitutional, HEENT, cardiovascular, pulmonary, gi and gu systems are negative, except as otherwise noted.      Objective    /68 (BP Location: Left arm, Patient Position: Chair, Cuff Size: Adult Large)   Pulse 81   Temp 98.2  F (36.8  C) (Oral)   Resp 20   Wt 100.4 kg (221 lb 6.4 oz)   LMP  (Exact Date)   SpO2 98%   Breastfeeding No   BMI 41.16 kg/m    Body mass index is 41.16 kg/m .  Physical Exam   GENERAL: healthy, alert and no distress  EYES: Eyes grossly normal to inspection, PERRL and conjunctivae and sclerae normal  HENT: ear canals and TM's normal, nose and mouth without ulcers or lesions  NECK: no adenopathy, no asymmetry, masses, or scars and thyroid normal to palpation  RESP: lungs clear to auscultation - no rales, rhonchi or wheezes  BREAST: normal without masses, tenderness or nipple discharge and no palpable axillary masses or adenopathy  CV: regular rate and rhythm, normal S1 S2, no S3 or S4, no murmur, click or rub, no peripheral edema and peripheral pulses  strong  ABDOMEN: soft, nontender, no hepatosplenomegaly, no masses and bowel sounds normal   (female): normal female external genitalia, normal urethral meatus, vaginal mucosa pink, moist, well rugated, and normal cervix/adnexa/uterus without masses, creamy, white discharge, pap, wet prep, GC obtained.  MS: no gross musculoskeletal defects noted, no edema  SKIN: no suspicious lesions or rashes  NEURO: Normal strength and tone, mentation intact and speech normal  PSYCH: mentation appears normal, affect normal/bright  LYMPH: no cervical, supraclavicular, axillary, or inguinal adenopathy    Diagnostic Test Results:  Labs reviewed in Epic  Results for orders placed or performed in visit on 02/27/20   Vitamin D Deficiency     Status: Abnormal   Result Value Ref Range    Vitamin D Deficiency screening 10 (L) 20 - 75 ug/L   Comprehensive metabolic panel     Status: Abnormal   Result Value Ref Range    Sodium 141 133 - 144 mmol/L    Potassium 4.0 3.4 - 5.3 mmol/L    Chloride 111 (H) 94 - 109 mmol/L    Carbon Dioxide 23 20 - 32 mmol/L    Anion Gap 7 3 - 14 mmol/L    Glucose 102 (H) 70 - 99 mg/dL    Urea Nitrogen 18 7 - 30 mg/dL    Creatinine 0.82 0.52 - 1.04 mg/dL    GFR Estimate >90 >60 mL/min/[1.73_m2]    GFR Estimate If Black >90 >60 mL/min/[1.73_m2]    Calcium 9.1 8.5 - 10.1 mg/dL    Bilirubin Total 0.8 0.2 - 1.3 mg/dL    Albumin 3.8 3.4 - 5.0 g/dL    Protein Total 7.4 6.8 - 8.8 g/dL    Alkaline Phosphatase 82 40 - 150 U/L    ALT 18 0 - 50 U/L    AST 14 0 - 45 U/L   Hemoglobin A1c     Status: None   Result Value Ref Range    Hemoglobin A1C 5.1 0 - 5.6 %   CBC with platelets     Status: Abnormal   Result Value Ref Range    WBC 6.3 4.0 - 11.0 10e9/L    RBC Count 4.97 3.8 - 5.2 10e12/L    Hemoglobin 13.1 11.7 - 15.7 g/dL    Hematocrit 40.7 35.0 - 47.0 %    MCV 82 78 - 100 fl    MCH 26.4 (L) 26.5 - 33.0 pg    MCHC 32.2 31.5 - 36.5 g/dL    RDW 12.3 10.0 - 15.0 %    Platelet Count 324 150 - 450 10e9/L   HCG qualitative urine      Status: None   Result Value Ref Range    HCG Qual Urine Negative NEG^Negative   DHEA sulfate     Status: None   Result Value Ref Range    DHEA Sulfate 225 35 - 430 ug/dL   17 OH progesterone     Status: None   Result Value Ref Range    17-OH Progesterone 49 ng/dL   Lutropin     Status: None   Result Value Ref Range    Lutropin 2.6 IU/L   **Testosterone Free and Total FUTURE anytime     Status: Abnormal   Result Value Ref Range    Testosterone Total 15 8 - 60 ng/dL    Sex Hormone Binding Globulin 20 (L) 30 - 135 nmol/L    Free Testosterone Calculated 0.34 0.08 - 0.74 ng/dL   Insulin level     Status: Abnormal   Result Value Ref Range    Insulin 93.8 (H) 3 - 25 mU/L   Pap imaged thin layer screen only - recommended age 21 - 24 years     Status: None   Result Value Ref Range    PAP NIL     Copath Report         Patient Name: KEVIN COLÓN  MR#: [id number]  Specimen #: [id number]  Collected: 2/27/2020  Received: 2/28/2020  Reported: 3/2/2020 14:46  Ordering Phy(s): ASHLEY SAUNDERS    For improved result formatting, select 'View Enhanced Report Format' under   Linked Documents section.    SPECIMEN/STAIN PROCESS:  Pap imaged thin layer prep screening (Surepath, FocalPoint with guided   screening)       Pap-Cyto x 1    SOURCE: Cervical, endocervical  ----------------------------------------------------------------   Pap imaged thin layer prep screening (Surepath, FocalPoint with guided   screening)  SPECIMEN ADEQUACY:  Satisfactory for evaluation.  -Transformation zone component absent.    CYTOLOGIC INTERPRETATION:    Negative for intraepithelial lesion or malignancy    Electronically signed out by:  CECILIA Mcgregor (ASCP)    CLINICAL HISTORY:    Irregular periods,    Papanicolaou Test Limitations:  Cervical cytology is a screening test with   limited sensitivity; regular  screening is critic al for cancer prevention; Pap tests are primarily   effective for the diagnosis/prevention of  squamous cell carcinoma, not  adenocarcinomas or other cancers.    COLLECTION SITE:  Client:  [facility]  Location: BK (B)    The technical component of this testing was completed at the [facility], with the professional component performed   at the [facility], [address] [place] [id number] ([phone])       UA reflex to Microscopic and Culture     Status: Abnormal   Result Value Ref Range    Color Urine Yellow     Appearance Urine Slightly Cloudy     Glucose Urine Negative NEG^Negative mg/dL    Bilirubin Urine Negative NEG^Negative    Ketones Urine Negative NEG^Negative mg/dL    Specific Gravity Urine >1.030 1.003 - 1.035    Blood Urine Large (A) NEG^Negative    pH Urine 6.0 5.0 - 7.0 pH    Protein Albumin Urine Negative NEG^Negative mg/dL    Urobilinogen Urine 0.2 0.2 - 1.0 EU/dL    Nitrite Urine Negative NEG^Negative    Leukocyte Esterase Urine Negative NEG^Negative    Source Midstream Urine    Urine Microscopic     Status: Abnormal   Result Value Ref Range    WBC Urine 0 - 5 OTO5^0 - 5 /HPF    RBC Urine 5-10 (A) OTO2^O - 2 /HPF    Squamous Epithelial /LPF Urine Few FEW^Few /LPF    Mucous Urine Present (A) NEG^Negative /LPF   Wet prep     Status: None   Result Value Ref Range    Specimen Description Vagina     Wet Prep No Trichomonas seen     Wet Prep No clue cells seen     Wet Prep No yeast seen     Wet Prep WBC'S seen  Rare      NEISSERIA GONORRHOEA PCR     Status: None   Result Value Ref Range    Specimen Descrip Cervix     N Gonorrhea PCR Negative NEG^Negative   CHLAMYDIA TRACHOMATIS PCR     Status: None   Result Value Ref Range    Specimen Description Cervix     Chlamydia Trachomatis PCR Negative NEG^Negative           Assessment & Plan     1. Mild persistent asthma without complication  Refilled Albuterol, Asthma Action Plan reviewed.      2. Morbid obesity with  BMI of 45.0-49.9, adult (H)  Discussed need for further work up before we start medication.  Explained that she needs to follow with Nutrition in order to be on weight loss medication and will need to follow monthly in clinic.  If she does not lose weight, I will not continue with weight loss medication.  She is not interested in referral to Bariatric Surgery at this time. Rx for Phentermine 15 mg daily written and she'll return to clinic 1 month for weight check., referred to Nutrtion as well.    3. Encounter for screening for cervical cancer     - Pap imaged thin layer screen only - recommended age 21 - 24 years    4. Vaginal irritation  Wet prep is negative.  She has some red cells in her urine but I think this is from scratching, reviewed genital hygiene in detail, avoid highly scented soaps, no douching, avoid spandex, etc. Increase fluid intake. Return to clinic if not improved, new, or worsening symptoms.   - Wet prep  - NEISSERIA GONORRHOEA PCR  - CHLAMYDIA TRACHOMATIS PCR  - UA reflex to Microscopic and Culture  - Urine Microscopic  5. Irregular menses    - Comprehensive metabolic panel  - Hemoglobin A1c  - CBC with platelets  - HCG qualitative urine  - DHEA sulfate  - 17 OH progesterone  - Lutropin  - **Testosterone Free and Total FUTURE anytime  - Insulin level    6. Chronic allergic rhinitis due to animal hair and dander  Refilled Zyrtec and Flonase  - cetirizine (ZYRTEC) 10 MG tablet; Take 1 tablet (10 mg) by mouth daily as needed for allergies  Dispense: 90 tablet; Refill: 1  - fluticasone (FLONASE) 50 MCG/ACT nasal spray; Spray 1 spray into both nostrils nightly as needed for rhinitis or allergies  Dispense: 1 g; Refill: 3    7. Allergy to peanuts  Refilled Epipen for prn use.  - EPINEPHrine (ANY BX GENERIC EQUIV) 0.3 MG/0.3ML injection 2-pack; Inject 0.3 mLs (0.3 mg) into the muscle as needed for anaphylaxis  Dispense: 1 each; Refill: 1    8. Vitamin D deficiency disease  Supplementing with 50,000  "international unit(s) Vit D2 weekly, recheck Vit D in 4 months, she is to take 2,000 international unit(s) OTC Vit D3 daily once she has finished the 50,000 weekly.  - Vitamin D Deficiency    9. Screening for HIV (human immunodeficiency virus)    - HIV Screening; Future     BMI:   Estimated body mass index is 41.16 kg/m  as calculated from the following:    Height as of 11/6/19: 1.562 m (5' 1.5\").    Weight as of this encounter: 100.4 kg (221 lb 6.4 oz).   Weight management plan: Discussed healthy diet and exercise guidelines        Work on weight loss  Regular exercise  See Patient Instructions    No follow-ups on file.  Approximately 45 minutes were spent face-to-face with patient and greater than 50% of that time was spent on counseling and coordination of care.   [facility]      "

## 2020-02-28 LAB
C TRACH DNA SPEC QL NAA+PROBE: NEGATIVE
DEPRECATED CALCIDIOL+CALCIFEROL SERPL-MC: 10 UG/L (ref 20–75)
DHEA-S SERPL-MCNC: 225 UG/DL (ref 35–430)
N GONORRHOEA DNA SPEC QL NAA+PROBE: NEGATIVE
SPECIMEN SOURCE: NORMAL
SPECIMEN SOURCE: NORMAL

## 2020-02-28 ASSESSMENT — ASTHMA QUESTIONNAIRES: ACT_TOTALSCORE: 19

## 2020-02-29 LAB
SHBG SERPL-SCNC: 20 NMOL/L (ref 30–135)
TESTOST FREE SERPL-MCNC: 0.34 NG/DL (ref 0.08–0.74)
TESTOST SERPL-MCNC: 15 NG/DL (ref 8–60)

## 2020-03-02 LAB
17OHP SERPL-MCNC: 49 NG/DL
COPATH REPORT: NORMAL
PAP: NORMAL

## 2020-03-03 RX ORDER — ALBUTEROL SULFATE 90 UG/1
2 AEROSOL, METERED RESPIRATORY (INHALATION) EVERY 4 HOURS PRN
Qty: 2 INHALER | Refills: 1 | Status: SHIPPED | OUTPATIENT
Start: 2020-03-03 | End: 2023-05-24

## 2020-03-03 RX ORDER — ERGOCALCIFEROL 1.25 MG/1
50000 CAPSULE, LIQUID FILLED ORAL WEEKLY
Qty: 8 CAPSULE | Refills: 0 | Status: SHIPPED | OUTPATIENT
Start: 2020-03-03 | End: 2023-05-24

## 2020-03-03 RX ORDER — PHENTERMINE HYDROCHLORIDE 15 MG/1
15 CAPSULE ORAL EVERY MORNING
Qty: 30 CAPSULE | Refills: 0 | Status: SHIPPED | OUTPATIENT
Start: 2020-03-03 | End: 2023-05-24

## 2020-11-16 ENCOUNTER — HEALTH MAINTENANCE LETTER (OUTPATIENT)
Age: 20
End: 2020-11-16

## 2020-12-18 ENCOUNTER — OFFICE VISIT (OUTPATIENT)
Dept: URGENT CARE | Facility: URGENT CARE | Age: 20
End: 2020-12-18
Payer: COMMERCIAL

## 2020-12-18 ENCOUNTER — ANCILLARY PROCEDURE (OUTPATIENT)
Dept: GENERAL RADIOLOGY | Facility: CLINIC | Age: 20
End: 2020-12-18
Attending: PHYSICIAN ASSISTANT
Payer: COMMERCIAL

## 2020-12-18 VITALS
OXYGEN SATURATION: 97 % | HEART RATE: 70 BPM | BODY MASS INDEX: 43.31 KG/M2 | RESPIRATION RATE: 16 BRPM | DIASTOLIC BLOOD PRESSURE: 70 MMHG | TEMPERATURE: 96.6 F | WEIGHT: 233 LBS | SYSTOLIC BLOOD PRESSURE: 113 MMHG

## 2020-12-18 DIAGNOSIS — M25.561 ACUTE PAIN OF RIGHT KNEE: ICD-10-CM

## 2020-12-18 DIAGNOSIS — S83.91XA SPRAIN OF RIGHT KNEE, UNSPECIFIED LIGAMENT, INITIAL ENCOUNTER: Primary | ICD-10-CM

## 2020-12-18 PROCEDURE — 99213 OFFICE O/P EST LOW 20 MIN: CPT | Performed by: PHYSICIAN ASSISTANT

## 2020-12-18 PROCEDURE — 73562 X-RAY EXAM OF KNEE 3: CPT | Mod: RT | Performed by: RADIOLOGY

## 2020-12-18 NOTE — PATIENT INSTRUCTIONS
Patient Education     RICE     Rest an injury, elevate it, and use ice and compression as directed.   RICE stands for rest, ice, compression, and elevation. These can limit pain and swelling after an injury. RICE may be recommended to help treat breaks (fractures), sprains, strains, and bruises or bumps.   Home care  Here are the details of RICE:    Rest. Limit the use of the injured body part. This helps prevent further damage to the body part and gives it time to heal. In some cases, you may need a sling, brace, splint, or cast to help keep the body part still until it has healed.    Ice. Applying ice right after an injury helps relieve pain and swelling. To make an ice pack, put ice cubes in a plastic bag that seals at the top. Wrap the bag in a clean, thin towel or cloth. Then place it over the injured area. Do this for 10 to 15 minutes every 3 to 4 hours. Continue for the next 1 to 3 days or until your symptoms improve. Never put ice directly on your skin. Don't ice an area longer than 15 minutes at a time.    Compression. Putting pressure on an injury helps reduce swelling and provides support. Wrap the injured area firmly with an elastic bandage or wrap. Make sure not to wrap the bandage too tightly or you will cut off blood flow to the injured area. If your bandage loosens, rewrap it.    Elevation. Keeping an injury raised or elevated above the level of your heart reduces swelling, pain, and throbbing. For instance, if you have a broken leg, it may help to rest your leg on several pillows when sitting or lying down. Try to keep the injured area elevated as often as possible.  Follow-up care  Follow up with your healthcare provider, or as advised.  When to seek medical advice  Call your healthcare provider right away if any of these occur:    Fever of 100.4 F (38 C) or higher, or as directed by your healthcare provider    Chills    Increased pain or swelling in the injured body part    Injured body part  becomes cold, blue, numb, or tingly    Signs of infection. These include warmth in the skin, redness, drainage, or bad smell coming from the injured body part.  [employer] last reviewed this educational content on 6/1/2018 2000-2020 [employer]. [address] [place] [phone]. All rights reserved. This information is not intended as a substitute for professional medical care. Always follow your healthcare professional's instructions.

## 2020-12-18 NOTE — PROGRESS NOTES
SUBJECTIVE:   Kat Rodriguez is a 20 year old female presenting with a chief complaint of   Chief Complaint   Patient presents with     Musculoskeletal Problem     Right knee pain x1+ week- getting worse. Had knee surgery about 5 years ago. Pain is not constant       She is an established patient of [place].    MS Injury/Pain    Onset of symptoms was 1 week(s) ago.  Location: right knee  Context: No Known injury but patient states that she does a lot of walking at work.  Course of symptoms is waxing and waning.    Severity moderate  Current and Associated symptoms: Pain  Denies  Swelling, Bruising, Warmth, Redness, Tenderness, Decreased range of motion and Stiffness  Aggravating Factors: bending her knee inward  Therapies to improve symptoms include: ice, ibuprofen, rest and elevation  This is not the first time this type of problem has occurred for this patient.       Review of Systems    Past Medical History:   Diagnosis Date     Asthma      Family History   Problem Relation Age of Onset     Obesity Mother      Hypertension Maternal Grandmother      Hyperlipidemia Maternal Grandmother      Obesity Maternal Grandfather      Current Outpatient Medications   Medication Sig Dispense Refill     albuterol (PROAIR HFA) 108 (90 Base) MCG/ACT inhaler Inhale 2 puffs into the lungs every 4 hours as needed for shortness of breath / dyspnea or wheezing for asthma symptoms. 2 Inhaler 1     Spacer/Aero Chamber Mouthpiece MISC Use with albuterol and flovent 2 each 0     cetirizine (ZYRTEC) 10 MG tablet Take 1 tablet (10 mg) by mouth daily as needed for allergies (Patient not taking: Reported on 12/18/2020) 90 tablet 1     EPINEPHrine (ANY BX GENERIC EQUIV) 0.3 MG/0.3ML injection 2-pack Inject 0.3 mLs (0.3 mg) into the muscle as needed for anaphylaxis (Patient not taking: Reported on 12/18/2020) 1 each 1     EPINEPHrine (EPIPEN) 0.3 MG/0.3ML injection Inject 0.3 mLs into the muscle once as needed for anaphylaxis. (Patient  not taking: Reported on 12/18/2020) 2 each 3     fluticasone (FLONASE) 50 MCG/ACT nasal spray Spray 1 spray into both nostrils nightly as needed for rhinitis or allergies (Patient not taking: Reported on 12/18/2020) 1 g 3     methocarbamol (ROBAXIN) 500 MG tablet Take 1-2 tablets (500-1,000 mg) by mouth nightly as needed for muscle spasms (Patient not taking: Reported on 12/18/2020) 20 tablet 0     naproxen (NAPROSYN) 500 MG tablet Take 1 tablet (500 mg) by mouth 2 times daily (with meals) (Patient not taking: Reported on 12/18/2020) 20 tablet 0     phentermine (ADIPEX-P) 15 MG capsule Take 1 capsule (15 mg) by mouth every morning (Patient not taking: Reported on 12/18/2020) 30 capsule 0     vitamin D2 (ERGOCALCIFEROL) 50098 units (1250 mcg) capsule Take 1 capsule (50,000 Units) by mouth once a week (Patient not taking: Reported on 12/18/2020) 8 capsule 0     Social History     Tobacco Use     Smoking status: Passive Smoke Exposure - Never Smoker     Smokeless tobacco: Never Used     Tobacco comment: father smokes   Substance Use Topics     Alcohol use: No       OBJECTIVE  /70   Pulse 70   Temp 96.6  F (35.9  C) (Tympanic)   Resp 16   Wt 105.7 kg (233 lb)   SpO2 97%   BMI 43.31 kg/m      Physical Exam  Constitutional:       General: She is not in acute distress.     Appearance: Normal appearance. She is normal weight. She is not ill-appearing, toxic-appearing or diaphoretic.   HENT:      Head: Normocephalic and atraumatic.   Cardiovascular:      Rate and Rhythm: Normal rate and regular rhythm.      Pulses: Normal pulses.      Heart sounds: Normal heart sounds. No murmur. No friction rub. No gallop.    Pulmonary:      Effort: Pulmonary effort is normal. No respiratory distress.      Breath sounds: Normal breath sounds. No stridor. No wheezing, rhonchi or rales.   Chest:      Chest wall: No tenderness.   Musculoskeletal:      Right knee: She exhibits normal range of motion, no swelling, no effusion, no  ecchymosis, no deformity, no erythema, normal alignment, no LCL laxity, normal patellar mobility and no MCL laxity. Tenderness found. Patellar tendon tenderness noted. No medial joint line, no lateral joint line, no MCL and no LCL tenderness noted.   Neurological:      General: No focal deficit present.      Mental Status: She is alert and oriented to person, place, and time. Mental status is at baseline.      Gait: Gait normal.      Deep Tendon Reflexes: Abnormal reflex:      Psychiatric:         Mood and Affect: Mood normal.         Behavior: Behavior normal.         Thought Content: Thought content normal.         Judgment: Judgment normal.         An X-ray was ordered today and reviewed by me. There does not appear to be any evidence of fracture. Awaiting radiology report.     ASSESSMENT/PLAN:    (S83.91XA) Sprain of right knee, unspecified ligament, initial encounter  (primary encounter diagnosis)    (M25.561) Acute pain of right knee  Plan: XR Knee Right 3 Views    Rest the affected area as much as possible.  Apply ice for 15-20 minutes intermittently as needed and especially after any offending activity. Hot packs are better for muscle spasms and cramping. Daily stretching as tolerated.  As pain recedes, begin normal activities slowly as tolerated.  Consider Physical Therapy after 6 weeks if symptoms not better with conservative care.      Okay to take acetaminophen 500 mg- 2 tabs (Total of 1000 mg) every 8 hrs   Okay to take ibuprofen 200 mg- 3 tabs (Total of 600 mg) every 6 hours      Louis Castillo PA-C on 12/18/2020 at 12:55 PM

## 2021-04-21 ENCOUNTER — IMMUNIZATION (OUTPATIENT)
Dept: PEDIATRICS | Facility: CLINIC | Age: 21
End: 2021-04-21
Payer: COMMERCIAL

## 2021-04-21 PROCEDURE — 91300 PR COVID VAC PFIZER DIL RECON 30 MCG/0.3 ML IM: CPT

## 2021-04-21 PROCEDURE — 0001A PR COVID VAC PFIZER DIL RECON 30 MCG/0.3 ML IM: CPT

## 2021-05-08 NOTE — TELEPHONE ENCOUNTER
Mother is calling on pts behalf.  Pt went to see psychiatrist, Dr. Bryson yesterday for psychological evaluation and MMPI.  She is a little concerned with the interaction that took place.  The visit did not go well.  For the very beginning Dr. Diaz told Kat she was to young to be having bariatric surgery.  He suggested she wait until her late 20's to consider bariatric surgery. Mother said he told her many stories of pts he has seen who had complications to try to scare her out of having surgery.  Kat was quite upset when she left the appointment.  He had her do the MMPI and said he would write up a report but said she would probably not be happy with the assessment her would be giving her.  Her mom believes that due to her age, she is not ready to have bariatric surgery.     I told her we would need to wait until we received the report to see what it entails. If there are some concerns regarding the validity of the evaluation we can review that and consider a re-evaluation with a psychologist within the [facility]. Pt said they actually have an appointment set up with the [facility].  I will forward a message to the provider she will be seeing to give an update.      4oz/solid 3oz thin/thin liquid

## 2021-05-12 ENCOUNTER — IMMUNIZATION (OUTPATIENT)
Dept: PEDIATRICS | Facility: CLINIC | Age: 21
End: 2021-05-12
Attending: INTERNAL MEDICINE
Payer: COMMERCIAL

## 2021-05-12 PROCEDURE — 0002A PR COVID VAC PFIZER DIL RECON 30 MCG/0.3 ML IM: CPT

## 2021-05-12 PROCEDURE — 91300 PR COVID VAC PFIZER DIL RECON 30 MCG/0.3 ML IM: CPT

## 2021-07-10 ENCOUNTER — ANCILLARY PROCEDURE (OUTPATIENT)
Dept: GENERAL RADIOLOGY | Facility: CLINIC | Age: 21
End: 2021-07-10
Attending: FAMILY MEDICINE
Payer: COMMERCIAL

## 2021-07-10 ENCOUNTER — OFFICE VISIT (OUTPATIENT)
Dept: URGENT CARE | Facility: URGENT CARE | Age: 21
End: 2021-07-10
Payer: COMMERCIAL

## 2021-07-10 VITALS
RESPIRATION RATE: 16 BRPM | TEMPERATURE: 97.2 F | HEART RATE: 82 BPM | WEIGHT: 230 LBS | SYSTOLIC BLOOD PRESSURE: 110 MMHG | BODY MASS INDEX: 42.75 KG/M2 | OXYGEN SATURATION: 98 % | DIASTOLIC BLOOD PRESSURE: 78 MMHG

## 2021-07-10 DIAGNOSIS — G44.209 TENSION HEADACHE: Primary | ICD-10-CM

## 2021-07-10 DIAGNOSIS — M25.561 ACUTE PAIN OF RIGHT KNEE: ICD-10-CM

## 2021-07-10 LAB
SARS-COV-2 RNA RESP QL NAA+PROBE: NORMAL
SPECIMEN SOURCE: NORMAL

## 2021-07-10 PROCEDURE — U0003 INFECTIOUS AGENT DETECTION BY NUCLEIC ACID (DNA OR RNA); SEVERE ACUTE RESPIRATORY SYNDROME CORONAVIRUS 2 (SARS-COV-2) (CORONAVIRUS DISEASE [COVID-19]), AMPLIFIED PROBE TECHNIQUE, MAKING USE OF HIGH THROUGHPUT TECHNOLOGIES AS DESCRIBED BY CMS-2020-01-R: HCPCS | Performed by: FAMILY MEDICINE

## 2021-07-10 PROCEDURE — 99213 OFFICE O/P EST LOW 20 MIN: CPT | Performed by: FAMILY MEDICINE

## 2021-07-10 PROCEDURE — U0005 INFEC AGEN DETEC AMPLI PROBE: HCPCS | Performed by: FAMILY MEDICINE

## 2021-07-10 PROCEDURE — 73560 X-RAY EXAM OF KNEE 1 OR 2: CPT | Mod: RT | Performed by: RADIOLOGY

## 2021-07-10 RX ORDER — ACETAMINOPHEN 500 MG
500-1000 TABLET ORAL EVERY 6 HOURS PRN
COMMUNITY
End: 2023-05-24

## 2021-07-10 NOTE — PROGRESS NOTES
SUBJECTIVE:  Kat Rodriguez, a 20 year old female scheduled an appointment to discuss the following issues:     Tension headache  Acute pain of right knee    Medical, social, surgical, and family histories reviewed.     Headache (Pain started on left side of head, that moves from nose to back of head.)   Knee Pain (Right knee pain for about a week, might have injury right knee.)    Right knee pain ( hx of ACL, MCL and menisucs surgery at age 14); onset of pain with recent twisitng of knee 1 week ago.   Headache---Pt was vaccinated for COVID-19 2-3 months ago.  She recently vacationed in [place], amongst [employer] and restaurants.  1 week ago she felt  Pain in her nose radiating to her head, wit some loss of sense of smell and taste.  She drinks alcohol and smokes weed.  No hx of migraine but has allergic rhinitis/sinus allergies.  No trauma to head/neck, no LOC.  Last Friday she also played basketball and twisted her left knee, 3/10 pain intensity; pain in right knee since, aggravated by walking and cam-crossing her legs.  LMP irregular, not pregnant.  No hx of gout.    ROS:  See HPI.  No nausea/vomiting.  No fever/chills.  No chest pain/SOB.  No BM/urine problems.  No dizziness or syncope.      OBJECTIVE:  /78 (BP Location: Left arm, Patient Position: Sitting, Cuff Size: Adult Large)   Pulse 82   Temp 97.2  F (36.2  C) (Tympanic)   Resp 16   Wt 104.3 kg (230 lb)   SpO2 98%   Breastfeeding No   BMI 42.75 kg/m    EXAM:  GENERAL APPEARANCE:  alert and mild distress; afebrile, not septic; no cyanosis or accessory muscle use  EYES: Eyes grossly normal to inspection, PERRL and conjunctivae and sclerae normal  HENT: ear canals and TM's normal and nose and mouth without ulcers or lesions; no frontal or maxillary sinus tenderness  NECK: no adenopathy, no asymmetry, masses, or scars and thyroid normal to palpation  RESP: lungs clear to auscultation - no rales, rhonchi or wheezes  CV: regular rates and  rhythm, normal S1 S2, no S3 or S4 and no murmur, click or rub  LYMPHATICS: no cervical adenopathy  ABDOMEN: soft, nontender, without hepatosplenomegaly or masses and bowel sounds normal  MS: extremities normal- no gross deformities noted; pt is 230 pounds; has well-healed anterior scar bilateral knees; right knee--mild effusion; no redness or increased temp; no ligament instability; negative anterior draw; able to walk without limping; able to fully flex and extend right knee  SKIN: no suspicious lesions or rashes  NEURO: Normal strength and tone, mentation intact and speech normal      ASSESSMENT/PLAN:  (G44.209) Tension headache  (primary encounter diagnosis)  Comment: COVID-19 test pending; could be viral  Plan: Symptomatic COVID-19 Virus (Coronavirus) by         PCR, SARS-CoV-2 COVID-19 Virus (Coronavirus) by        PCR      (M25.561) Acute pain of right knee  Comment: xray right knee showed: Negative for acute bone or joint abnormality. Normal joint alignment. No joint effusion. No fracture. Prior ACL graft reconstruction, with distal femoral and proximal tibial tunnels and a  suture button along the lateral cortex of the distal femur.  Periarticular soft tissues are unremarkable.  Plan: XR Knee Standing Right 2 Views    RICE.  Tylenol/Ibuprofen PRN pain. Quadriceps strengthening exercises advised. Fluid, rest.  Avoid smoking.  Pt to f/up PCP in 2 weeks, sooner if no improvement or worsening.  Warning signs and symptoms explained.

## 2021-07-10 NOTE — PATIENT INSTRUCTIONS
Patient Education     Tension Headache     A muscle tension headache is a very common cause of head pain. It s also called a stress headache. When some people are under stress, they tense the muscles of their shoulder, neck, and scalp without knowing it. If this tension lasts long enough, a headache can occur. A tension headache can be quite painful. It can last for hours or even days.  Home care  Follow these tips when caring for yourself at home:    Don t drive yourself home if you were given pain medicine for your headache. Instead, have someone else drive you home. Try to sleep when you get home. You should feel much better when you wake up.    Put heat on the back of your neck to help ease neck spasm.  How to prevent tension-type headaches    Figure out what is causing stress in your life. Learn new ways to handle your stress. Ideas include regular exercise, biofeedback, self-hypnosis, yoga, and meditation. Talk with your healthcare provider to find out more information about managing stress. Many books and digital media are also available on this subject.    Take time out at the first sign of a tension headache, if possible. Take yourself out of the stressful situation. Find a quiet, comfortable place to sit or lie down and let yourself relax. Heat and deep massage of the tight areas in the neck and shoulders may help ease muscle spasm. You may also get relief from a medicine such as acetaminophen, ibuprofen, naproxen, or a prescribed muscle relaxant.    Follow-up care  Follow up with your healthcare provider, or as advised. Talk with your provider if you have frequent headaches. He or she can figure out a treatment plan. Ask if you can have medicine to take at home the next time you get a bad headache. This may keep you from having to visit the emergency department in the future. You may need to see a headache specialist (neurologist) if you continue to have headaches.  When to seek medical advice  Call  your healthcare provider right away if any of these occur:    Your head pain gets worse during sexual intercourse or strenuous activity    Your head pain doesn t get better within 24 hours    You aren t able to keep liquids down (repeated vomiting)    Fever of 100.4 F (38 C) or higher, or as directed by your healthcare provider    Stiff neck    Extreme drowsiness, confusion, or fainting    Dizziness or dizziness with spinning sensation (vertigo)    Weakness in an arm or leg or one side of your face    You have trouble speaking    Your vision changes  [employer] last reviewed this educational content on 10/1/2019    [id number] [employer]. All rights reserved. This information is not intended as a substitute for professional medical care. Always follow your healthcare professional's instructions.           Patient Education     Knee Sprain    A sprain is an injury to the ligaments or capsule that holds a joint together. There are no broken bones. Most sprains take 3 to 6 weeks to heal. If it a severe sprain where the ligament is completely torn, it can take months to recover.  Most knee sprains are treated with a splint, knee immobilizer brace, or elastic wrap for support. Severe sprains may rarely require surgery.  Home care    Stay off the injured leg as much as possible until you can walk on it without pain. If you have a lot of pain with walking, crutches or a walker may be prescribed. (These can be rented or purchased at many pharmacies and surgical or orthopedic supply stores). Follow your healthcare provider's advice about when to begin putting weight on that leg.    Keep your leg elevated to reduce pain and swelling. When sleeping, place a pillow under the injured leg. When sitting, support the injured leg so it is above heart level. This is very important during the first 48 hours.    Apply an ice pack over the injured area for 15 to 20 minutes every 3 to 6 hours. You should do this for the  first 24 to 48 hours. You can make an ice pack by filling a plastic bag that seals at the top with ice cubes and then wrapping it with a thin towel. Continue to use ice packs for relief of pain and swelling as needed. As the ice melts, be careful to avoid getting your wrap, splint, or cast wet. After 48 hours, apply heat (warm shower or warm bath) for 15 to 20 minutes several times a day, or alternate ice and heat. You can place the ice pack directly over the splint. If you have to wear a hook-and-loop knee brace, you can open it to apply the ice pack, or heat, directly to the knee. Never put ice directly on the skin. Always wrap the ice in a towel or other type of cloth.    You may use over-the-counter pain medicine to control pain, unless another pain medicine was prescribed. If you have chronic liver or kidney disease or ever had a stomach ulcer or gastrointestinal bleeding, talk with your healthcare provider before using these medicines.    If you were given a splint, keep it completely dry at all times. Bathe with your splint out of the water, protected with 2 large plastic bags, sealed with rubber bands or tape at the top end. If a fiberglass splint gets wet, you can dry it with a hair dryer set to cool. If you have a hook-and-loop knee brace, you can remove this to bathe, unless told otherwise.  Follow-up care  Follow up with your doctor as advised. Any X-rays you had today don t show any broken bones, breaks, or fractures. Sometimes fractures don t show up on the first X-ray. Bruises and sprains can sometimes hurt as much as a fracture. These injuries can take time to heal completely. If your symptoms don t improve or they get worse, talk with your doctor. You may need a repeat X-ray. If X-rays were taken, you will be told of any new findings that may affect your care.  Call 911  Call 911 if you have:     Shortness of breath     Chest pain  When to seek medical advice  Call your healthcare provider right  away if any of these occur:    The splint or knee immobilizer brace becomes wet or soft    The fiberglass cast or splint remains wet for more than 24 hours    Pain or swelling increases    The injured leg or toes become cold, blue, numb, or tingly  [employer] last reviewed this educational content on 5/1/2018 2000-2021 [employer]. All rights reserved. This information is not intended as a substitute for professional medical care. Always follow your healthcare professional's instructions.

## 2021-07-11 LAB
LABORATORY COMMENT REPORT: ABNORMAL
SARS-COV-2 RNA RESP QL NAA+PROBE: POSITIVE
SPECIMEN SOURCE: ABNORMAL

## 2021-09-18 ENCOUNTER — HEALTH MAINTENANCE LETTER (OUTPATIENT)
Age: 21
End: 2021-09-18

## 2022-01-08 ENCOUNTER — HEALTH MAINTENANCE LETTER (OUTPATIENT)
Age: 22
End: 2022-01-08

## 2022-04-20 ENCOUNTER — LAB (OUTPATIENT)
Dept: URGENT CARE | Facility: URGENT CARE | Age: 22
End: 2022-04-20
Attending: FAMILY MEDICINE
Payer: COMMERCIAL

## 2022-04-20 DIAGNOSIS — Z20.822 SUSPECTED 2019 NOVEL CORONAVIRUS INFECTION: ICD-10-CM

## 2022-04-20 PROCEDURE — U0005 INFEC AGEN DETEC AMPLI PROBE: HCPCS

## 2022-04-20 PROCEDURE — U0003 INFECTIOUS AGENT DETECTION BY NUCLEIC ACID (DNA OR RNA); SEVERE ACUTE RESPIRATORY SYNDROME CORONAVIRUS 2 (SARS-COV-2) (CORONAVIRUS DISEASE [COVID-19]), AMPLIFIED PROBE TECHNIQUE, MAKING USE OF HIGH THROUGHPUT TECHNOLOGIES AS DESCRIBED BY CMS-2020-01-R: HCPCS

## 2022-04-21 LAB — SARS-COV-2 RNA RESP QL NAA+PROBE: NEGATIVE

## 2022-04-22 ENCOUNTER — OFFICE VISIT (OUTPATIENT)
Dept: URGENT CARE | Facility: URGENT CARE | Age: 22
End: 2022-04-22
Payer: COMMERCIAL

## 2022-04-22 VITALS
SYSTOLIC BLOOD PRESSURE: 112 MMHG | TEMPERATURE: 97.5 F | BODY MASS INDEX: 38.78 KG/M2 | DIASTOLIC BLOOD PRESSURE: 79 MMHG | HEART RATE: 81 BPM | WEIGHT: 208.6 LBS | OXYGEN SATURATION: 98 %

## 2022-04-22 DIAGNOSIS — R51.9 ACUTE NONINTRACTABLE HEADACHE, UNSPECIFIED HEADACHE TYPE: Primary | ICD-10-CM

## 2022-04-22 LAB
ALBUMIN SERPL-MCNC: 4.1 G/DL (ref 3.4–5)
ALP SERPL-CCNC: 61 U/L (ref 40–150)
ALT SERPL W P-5'-P-CCNC: 23 U/L (ref 0–50)
ANION GAP SERPL CALCULATED.3IONS-SCNC: 6 MMOL/L (ref 3–14)
AST SERPL W P-5'-P-CCNC: 16 U/L (ref 0–45)
BASOPHILS # BLD AUTO: 0 10E3/UL (ref 0–0.2)
BASOPHILS NFR BLD AUTO: 0 %
BILIRUB SERPL-MCNC: 0.9 MG/DL (ref 0.2–1.3)
BUN SERPL-MCNC: 17 MG/DL (ref 7–30)
CALCIUM SERPL-MCNC: 10 MG/DL (ref 8.5–10.1)
CHLORIDE BLD-SCNC: 106 MMOL/L (ref 94–109)
CO2 SERPL-SCNC: 26 MMOL/L (ref 20–32)
CREAT SERPL-MCNC: 0.82 MG/DL (ref 0.52–1.04)
EOSINOPHIL # BLD AUTO: 0.1 10E3/UL (ref 0–0.7)
EOSINOPHIL NFR BLD AUTO: 1 %
ERYTHROCYTE [DISTWIDTH] IN BLOOD BY AUTOMATED COUNT: 11.9 % (ref 10–15)
GFR SERPL CREATININE-BSD FRML MDRD: >90 ML/MIN/1.73M2
GLUCOSE BLD-MCNC: 95 MG/DL (ref 70–99)
HBA1C MFR BLD: 5.4 % (ref 0–5.6)
HCT VFR BLD AUTO: 43.1 % (ref 35–47)
HGB BLD-MCNC: 14.2 G/DL (ref 11.7–15.7)
IMM GRANULOCYTES # BLD: 0 10E3/UL
IMM GRANULOCYTES NFR BLD: 0 %
LYMPHOCYTES # BLD AUTO: 2.3 10E3/UL (ref 0.8–5.3)
LYMPHOCYTES NFR BLD AUTO: 39 %
MCH RBC QN AUTO: 26.9 PG (ref 26.5–33)
MCHC RBC AUTO-ENTMCNC: 32.9 G/DL (ref 31.5–36.5)
MCV RBC AUTO: 82 FL (ref 78–100)
MONOCYTES # BLD AUTO: 0.5 10E3/UL (ref 0–1.3)
MONOCYTES NFR BLD AUTO: 9 %
NEUTROPHILS # BLD AUTO: 3 10E3/UL (ref 1.6–8.3)
NEUTROPHILS NFR BLD AUTO: 51 %
PLATELET # BLD AUTO: 331 10E3/UL (ref 150–450)
POTASSIUM BLD-SCNC: 4 MMOL/L (ref 3.4–5.3)
PROT SERPL-MCNC: 8.1 G/DL (ref 6.8–8.8)
RBC # BLD AUTO: 5.27 10E6/UL (ref 3.8–5.2)
SODIUM SERPL-SCNC: 138 MMOL/L (ref 133–144)
WBC # BLD AUTO: 5.9 10E3/UL (ref 4–11)

## 2022-04-22 PROCEDURE — 36415 COLL VENOUS BLD VENIPUNCTURE: CPT | Performed by: PHYSICIAN ASSISTANT

## 2022-04-22 PROCEDURE — 96372 THER/PROPH/DIAG INJ SC/IM: CPT | Performed by: PHYSICIAN ASSISTANT

## 2022-04-22 PROCEDURE — 99213 OFFICE O/P EST LOW 20 MIN: CPT | Mod: 25 | Performed by: PHYSICIAN ASSISTANT

## 2022-04-22 PROCEDURE — 83036 HEMOGLOBIN GLYCOSYLATED A1C: CPT | Performed by: PHYSICIAN ASSISTANT

## 2022-04-22 PROCEDURE — 85025 COMPLETE CBC W/AUTO DIFF WBC: CPT | Performed by: PHYSICIAN ASSISTANT

## 2022-04-22 PROCEDURE — 80053 COMPREHEN METABOLIC PANEL: CPT | Performed by: PHYSICIAN ASSISTANT

## 2022-04-22 RX ORDER — KETOROLAC TROMETHAMINE 30 MG/ML
30 INJECTION, SOLUTION INTRAMUSCULAR; INTRAVENOUS ONCE
Status: COMPLETED | OUTPATIENT
Start: 2022-04-22 | End: 2022-04-22

## 2022-04-22 RX ORDER — CYCLOBENZAPRINE HCL 10 MG
10 TABLET ORAL 2 TIMES DAILY PRN
Qty: 20 TABLET | Refills: 0 | Status: SHIPPED | OUTPATIENT
Start: 2022-04-22 | End: 2022-05-02

## 2022-04-22 RX ORDER — AMOXICILLIN 875 MG
875 TABLET ORAL 2 TIMES DAILY
Qty: 20 TABLET | Refills: 0 | Status: SHIPPED | OUTPATIENT
Start: 2022-04-22 | End: 2022-05-02

## 2022-04-22 RX ADMIN — KETOROLAC TROMETHAMINE 30 MG: 30 INJECTION, SOLUTION INTRAMUSCULAR; INTRAVENOUS at 11:08

## 2022-04-22 NOTE — PROGRESS NOTES
Chief Complaint   Patient presents with     Headache     Headache lasting for almost a week, movement clements headache instanly     Chest Pain     Chest pain started today out of nowhere, really bad pain off and on this morning, sharp pain     Results for orders placed or performed in visit on 04/22/22   Hemoglobin A1c     Status: Normal   Result Value Ref Range    Hemoglobin A1C 5.4 0.0 - 5.6 %   Comprehensive metabolic panel (BMP + Alb, Alk Phos, ALT, AST, Total. Bili, TP)     Status: Normal   Result Value Ref Range    Sodium 138 133 - 144 mmol/L    Potassium 4.0 3.4 - 5.3 mmol/L    Chloride 106 94 - 109 mmol/L    Carbon Dioxide (CO2) 26 20 - 32 mmol/L    Anion Gap 6 3 - 14 mmol/L    Urea Nitrogen 17 7 - 30 mg/dL    Creatinine 0.82 0.52 - 1.04 mg/dL    Calcium 10.0 8.5 - 10.1 mg/dL    Glucose 95 70 - 99 mg/dL    Alkaline Phosphatase 61 40 - 150 U/L    AST 16 0 - 45 U/L    ALT 23 0 - 50 U/L    Protein Total 8.1 6.8 - 8.8 g/dL    Albumin 4.1 3.4 - 5.0 g/dL    Bilirubin Total 0.9 0.2 - 1.3 mg/dL    GFR Estimate >90 >60 mL/min/1.73m2   CBC with platelets and differential     Status: Abnormal   Result Value Ref Range    WBC Count 5.9 4.0 - 11.0 10e3/uL    RBC Count 5.27 (H) 3.80 - 5.20 10e6/uL    Hemoglobin 14.2 11.7 - 15.7 g/dL    Hematocrit 43.1 35.0 - 47.0 %    MCV 82 78 - 100 fL    MCH 26.9 26.5 - 33.0 pg    MCHC 32.9 31.5 - 36.5 g/dL    RDW 11.9 10.0 - 15.0 %    Platelet Count 331 150 - 450 10e3/uL    % Neutrophils 51 %    % Lymphocytes 39 %    % Monocytes 9 %    % Eosinophils 1 %    % Basophils 0 %    % Immature Granulocytes 0 %    Absolute Neutrophils 3.0 1.6 - 8.3 10e3/uL    Absolute Lymphocytes 2.3 0.8 - 5.3 10e3/uL    Absolute Monocytes 0.5 0.0 - 1.3 10e3/uL    Absolute Eosinophils 0.1 0.0 - 0.7 10e3/uL    Absolute Basophils 0.0 0.0 - 0.2 10e3/uL    Absolute Immature Granulocytes 0.0 <=0.4 10e3/uL   CBC with platelets and differential     Status: Abnormal    Narrative    The following orders were created for  panel order CBC with platelets and differential.  Procedure                               Abnormality         Status                     ---------                               -----------         ------                     CBC with platelets and d...[[id number]]  Abnormal            Final result                 Please view results for these tests on the individual orders.                   ASSESSMENT:     PLAN: Neurologically intact.  Vital signs stable.  Headache that comes and goes over the past week.  Started with some nasal congestion and sore throat.  Negative COVID test.  Unclear etiology.  Sinusitis vs musculoskeletal etiology.  Seems to be worse with movements.  Chest pain that she woke up with this morning.  Initially when she arrived she stated she had no further chest pain.  Now feels there is some mild chest pain.  She is giving a GI cocktail which she states did resolve the chest pain.  Chest pain likely gastritis from Excedrin and Aleve.  Monitor symptoms.  Could try over-the-counter Tums if returns.  If chest pain is not resolved with Tums then instructed to go to the ER for evaluation and treatment.  I prescribed antibiotic and muscle relaxant for the headache.  Do not drive if taking the muscle relaxant.  If symptoms worsen or new signs or symptoms develop instructed to go to the ER over the weekend.   I have discussed clinical findings with patient.  Side effects of medications discussed.  Symptomatic care is discussed.  I have discussed the possibility of  worsening symptoms and indication to RTC or go to the ER if they occur.  All questions are answered, patient indicates understanding of these issues and is in agreement with plan.   Patient care instructions are discussed/given at the end of visit.   Lots of rest and fluids.  CBC, hemoglobin A1c and comprehensive metabolic panel all normal here today.    Bruna Gale PA-C      SUBJECTIVE: 21-year-old female presents for intermittent  headache for 1 week.   Mainly right forehead.  Better today.  Rates it 5 out of 10.  Seems to be worse with movements.  Denies neck pain.  Did have some photophobia when outside today but lites today here in clinic do not seem to bother her.  She denies any face arm or leg paresthesias.  No nausea or vomiting.  On Easter did have some sore throat and nasal congestion but this resolved.  Does come planing of a mild cough.  Negative COVID test 4/20.  No blurry vision.  No history of any frequent headaches.  Family history of diabetes.  She denies acid taste in her mouth.  No except for the belching or burping.  Some abdominal discomfort last night but none today.  Has tried Aleve, Tylenol and Excedrin.  Excedrin helped a little bit.  No tooth pain.  Denies clenching or grinding her teeth.  No watery eyes.  Did not sleep last night because of the headache.  Did fall asleep at 5 AM and woke up at 8 AM with sternal chest pain.  It is gone right now.  No fever or chills.  No dizziness.  No calf pain or swelling.  Is not on any hormones.        Allergies   Allergen Reactions     Cats Itching     Fish      Nuts      Tree nuts, not peanuts     Trees Swelling       Past Medical History:   Diagnosis Date     Asthma        albuterol (PROAIR HFA) 108 (90 Base) MCG/ACT inhaler, Inhale 2 puffs into the lungs every 4 hours as needed for shortness of breath / dyspnea or wheezing for asthma symptoms.  acetaminophen (TYLENOL) 500 MG tablet, Take 500-1,000 mg by mouth every 6 hours as needed for mild pain (Patient not taking: Reported on 4/22/2022)  cetirizine (ZYRTEC) 10 MG tablet, Take 1 tablet (10 mg) by mouth daily as needed for allergies (Patient not taking: No sig reported)  EPINEPHrine (ANY BX GENERIC EQUIV) 0.3 MG/0.3ML injection 2-pack, Inject 0.3 mLs (0.3 mg) into the muscle as needed for anaphylaxis (Patient not taking: No sig reported)  EPINEPHrine (EPIPEN) 0.3 MG/0.3ML injection, Inject 0.3 mLs into the muscle once as  needed for anaphylaxis. (Patient not taking: No sig reported)  fluticasone (FLONASE) 50 MCG/ACT nasal spray, Spray 1 spray into both nostrils nightly as needed for rhinitis or allergies (Patient not taking: No sig reported)  methocarbamol (ROBAXIN) 500 MG tablet, Take 1-2 tablets (500-1,000 mg) by mouth nightly as needed for muscle spasms (Patient not taking: No sig reported)  naproxen (NAPROSYN) 500 MG tablet, Take 1 tablet (500 mg) by mouth 2 times daily (with meals) (Patient not taking: No sig reported)  phentermine (ADIPEX-P) 15 MG capsule, Take 1 capsule (15 mg) by mouth every morning (Patient not taking: No sig reported)  Spacer/Aero Chamber Mouthpiece MISC, Use with albuterol and flovent (Patient not taking: No sig reported)  vitamin D2 (ERGOCALCIFEROL) 17003 units (1250 mcg) capsule, Take 1 capsule (50,000 Units) by mouth once a week (Patient not taking: No sig reported)    No current facility-administered medications on file prior to visit.      Social History     Tobacco Use     Smoking status: Passive Smoke Exposure - Never Smoker     Smokeless tobacco: Never Used     Tobacco comment: father smokes   Substance Use Topics     Alcohol use: No       ROS:  CONSTITUTIONAL: Negative for fatigue or fever.  EYES: Negative for eye problems.  ENT: Neg for ST.  RESP: Neg for SHORTNESS OF BREATH.  CV: Negative for chest pains.  GI: Negative for vomiting.  MUSCULOSKELETAL:  As above.  NEUROLOGIC: as above.  SKIN: Negative for rash.  PSYCH: Normal mentation for age.    OBJECTIVE:  /79   Pulse 81   Temp 97.5  F (36.4  C) (Axillary)   Wt 94.6 kg (208 lb 9.6 oz)   SpO2 98%   BMI 38.78 kg/m    GENERAL APPEARANCE: Healthy, alert and no distress.  EYES:Conjunctiva/sclera clear.  EARS: No cerumen.   Ear canals w/o erythema.  TM's intact w/o erythema.    NOSE/MOUTH: Nose without ulcers, erythema or lesions.  SINUSES: No maxillary sinus tenderness.  THROAT: No erythema w/o tonsillar enlargement . No exudates.  NECK:  Supple, nontender, no lymphadenopathy.  Full range of motion without discomfort.  RESP: Lungs clear to auscultation - no rales, rhonchi or wheezes  CV: Regular rate and rhythm, normal S1 S2, no murmur noted.  NEURO: Awake, alert    SKIN: No rashes  Chest wall-nontender to palpation.  Abdomen-soft, nontender.  No hepatosplenomegaly.  Negative pronator drift.  Smile symmetric.  No tongue deviation.  Cranial nerves II through XII grossly intact.    Bruna Gale PA-C

## 2022-04-22 NOTE — NURSING NOTE
Clinic Administered Medication Documentation    Oral Medication Documentation    Patient was given GI Cocktail. Prior to medication administration, verified patients identity using patient s name and date of birth. Please see MAR and medication order for additional information.     Was entire amount of medication used? Yes  Expiration Date: 07/2023, 09/23    Donnie Doan CMA

## 2022-04-22 NOTE — NURSING NOTE
Clinic Administered Medication Documentation      Injectable Medication Documentation    Patient was given Ketorolac Tromethamine (Toradol). Prior to medication administration, verified patients identity using patient s name and date of birth. Please see MAR and medication order for additional information. Patient instructed to remain in clinic for 15 minutes.      Was entire vial of medication used? Yes  Vial/Syringe: Single dose vial  Expiration Date:  01/24  Was this medication supplied by the patient? No     Donnie Doan CMA

## 2022-04-23 ENCOUNTER — HOSPITAL ENCOUNTER (EMERGENCY)
Facility: CLINIC | Age: 22
Discharge: HOME | End: 2022-04-23
Attending: EMERGENCY MEDICINE | Admitting: EMERGENCY MEDICINE
Payer: COMMERCIAL

## 2022-04-23 ENCOUNTER — APPOINTMENT (OUTPATIENT)
Dept: CT IMAGING | Facility: CLINIC | Age: 22
End: 2022-04-23
Attending: EMERGENCY MEDICINE
Payer: COMMERCIAL

## 2022-04-23 VITALS
OXYGEN SATURATION: 98 % | TEMPERATURE: 98 F | DIASTOLIC BLOOD PRESSURE: 74 MMHG | SYSTOLIC BLOOD PRESSURE: 116 MMHG | HEART RATE: 74 BPM | RESPIRATION RATE: 16 BRPM

## 2022-04-23 DIAGNOSIS — G44.209 TENSION HEADACHE: ICD-10-CM

## 2022-04-23 LAB
ANION GAP SERPL CALCULATED.3IONS-SCNC: 6 MMOL/L (ref 3–14)
BASOPHILS # BLD AUTO: 0 10E3/UL (ref 0–0.2)
BASOPHILS NFR BLD AUTO: 0 %
BUN SERPL-MCNC: 19 MG/DL (ref 7–30)
CALCIUM SERPL-MCNC: 9.3 MG/DL (ref 8.5–10.1)
CHLORIDE BLD-SCNC: 112 MMOL/L (ref 94–109)
CO2 SERPL-SCNC: 23 MMOL/L (ref 20–32)
CREAT SERPL-MCNC: 0.83 MG/DL (ref 0.52–1.04)
EOSINOPHIL # BLD AUTO: 0.1 10E3/UL (ref 0–0.7)
EOSINOPHIL NFR BLD AUTO: 2 %
ERYTHROCYTE [DISTWIDTH] IN BLOOD BY AUTOMATED COUNT: 12 % (ref 10–15)
FLUAV RNA SPEC QL NAA+PROBE: NEGATIVE
FLUBV RNA RESP QL NAA+PROBE: NEGATIVE
GFR SERPL CREATININE-BSD FRML MDRD: >90 ML/MIN/1.73M2
GLUCOSE BLD-MCNC: 81 MG/DL (ref 70–99)
HCT VFR BLD AUTO: 41.2 % (ref 35–47)
HGB BLD-MCNC: 13.5 G/DL (ref 11.7–15.7)
IMM GRANULOCYTES # BLD: 0 10E3/UL
IMM GRANULOCYTES NFR BLD: 0 %
LYMPHOCYTES # BLD AUTO: 3 10E3/UL (ref 0.8–5.3)
LYMPHOCYTES NFR BLD AUTO: 44 %
MCH RBC QN AUTO: 27.2 PG (ref 26.5–33)
MCHC RBC AUTO-ENTMCNC: 32.8 G/DL (ref 31.5–36.5)
MCV RBC AUTO: 83 FL (ref 78–100)
MONOCYTES # BLD AUTO: 0.5 10E3/UL (ref 0–1.3)
MONOCYTES NFR BLD AUTO: 7 %
NEUTROPHILS # BLD AUTO: 3.1 10E3/UL (ref 1.6–8.3)
NEUTROPHILS NFR BLD AUTO: 47 %
NRBC # BLD AUTO: 0 10E3/UL
NRBC BLD AUTO-RTO: 0 /100
PLATELET # BLD AUTO: 323 10E3/UL (ref 150–450)
POTASSIUM BLD-SCNC: 4.1 MMOL/L (ref 3.4–5.3)
RBC # BLD AUTO: 4.96 10E6/UL (ref 3.8–5.2)
SARS-COV-2 RNA RESP QL NAA+PROBE: NEGATIVE
SODIUM SERPL-SCNC: 141 MMOL/L (ref 133–144)
WBC # BLD AUTO: 6.7 10E3/UL (ref 4–11)

## 2022-04-23 PROCEDURE — 87636 SARSCOV2 & INF A&B AMP PRB: CPT | Performed by: EMERGENCY MEDICINE

## 2022-04-23 PROCEDURE — 99285 EMERGENCY DEPT VISIT HI MDM: CPT | Mod: 25 | Performed by: EMERGENCY MEDICINE

## 2022-04-23 PROCEDURE — C9803 HOPD COVID-19 SPEC COLLECT: HCPCS | Performed by: EMERGENCY MEDICINE

## 2022-04-23 PROCEDURE — 80048 BASIC METABOLIC PNL TOTAL CA: CPT | Performed by: EMERGENCY MEDICINE

## 2022-04-23 PROCEDURE — 85004 AUTOMATED DIFF WBC COUNT: CPT | Performed by: EMERGENCY MEDICINE

## 2022-04-23 PROCEDURE — 250N000011 HC RX IP 250 OP 636: Performed by: EMERGENCY MEDICINE

## 2022-04-23 PROCEDURE — 96375 TX/PRO/DX INJ NEW DRUG ADDON: CPT | Performed by: EMERGENCY MEDICINE

## 2022-04-23 PROCEDURE — 258N000003 HC RX IP 258 OP 636: Performed by: EMERGENCY MEDICINE

## 2022-04-23 PROCEDURE — 99284 EMERGENCY DEPT VISIT MOD MDM: CPT | Performed by: EMERGENCY MEDICINE

## 2022-04-23 PROCEDURE — 36415 COLL VENOUS BLD VENIPUNCTURE: CPT | Performed by: EMERGENCY MEDICINE

## 2022-04-23 PROCEDURE — 96361 HYDRATE IV INFUSION ADD-ON: CPT | Performed by: EMERGENCY MEDICINE

## 2022-04-23 PROCEDURE — 96374 THER/PROPH/DIAG INJ IV PUSH: CPT | Performed by: EMERGENCY MEDICINE

## 2022-04-23 PROCEDURE — 250N000013 HC RX MED GY IP 250 OP 250 PS 637: Performed by: EMERGENCY MEDICINE

## 2022-04-23 PROCEDURE — 70450 CT HEAD/BRAIN W/O DYE: CPT

## 2022-04-23 RX ORDER — HYDROCODONE BITARTRATE AND ACETAMINOPHEN 5; 325 MG/1; MG/1
1 TABLET ORAL ONCE
Status: COMPLETED | OUTPATIENT
Start: 2022-04-23 | End: 2022-04-23

## 2022-04-23 RX ORDER — KETOROLAC TROMETHAMINE 15 MG/ML
15 INJECTION, SOLUTION INTRAMUSCULAR; INTRAVENOUS ONCE
Status: COMPLETED | OUTPATIENT
Start: 2022-04-23 | End: 2022-04-23

## 2022-04-23 RX ORDER — DIPHENHYDRAMINE HYDROCHLORIDE 50 MG/ML
50 INJECTION INTRAMUSCULAR; INTRAVENOUS ONCE
Status: COMPLETED | OUTPATIENT
Start: 2022-04-23 | End: 2022-04-23

## 2022-04-23 RX ORDER — SODIUM CHLORIDE 9 MG/ML
INJECTION, SOLUTION INTRAVENOUS CONTINUOUS
Status: DISCONTINUED | OUTPATIENT
Start: 2022-04-23 | End: 2022-04-23 | Stop reason: HOSPADM

## 2022-04-23 RX ORDER — ONDANSETRON 2 MG/ML
4 INJECTION INTRAMUSCULAR; INTRAVENOUS EVERY 30 MIN PRN
Status: DISCONTINUED | OUTPATIENT
Start: 2022-04-23 | End: 2022-04-23 | Stop reason: HOSPADM

## 2022-04-23 RX ADMIN — DIPHENHYDRAMINE HYDROCHLORIDE 50 MG: 50 INJECTION, SOLUTION INTRAMUSCULAR; INTRAVENOUS at 18:15

## 2022-04-23 RX ADMIN — KETOROLAC TROMETHAMINE 15 MG: 15 INJECTION, SOLUTION INTRAMUSCULAR; INTRAVENOUS at 18:15

## 2022-04-23 RX ADMIN — SODIUM CHLORIDE 1000 ML: 9 INJECTION, SOLUTION INTRAVENOUS at 18:13

## 2022-04-23 RX ADMIN — HYDROCODONE BITARTRATE AND ACETAMINOPHEN 1 TABLET: 5; 325 TABLET ORAL at 21:20

## 2022-04-23 ASSESSMENT — ENCOUNTER SYMPTOMS
NAUSEA: 0
COUGH: 1
CHILLS: 1
RHINORRHEA: 0
SORE THROAT: 0
PHOTOPHOBIA: 1
VOMITING: 0
HEADACHES: 1
NECK PAIN: 1
SHORTNESS OF BREATH: 0
FEVER: 0

## 2022-04-23 NOTE — ED PROVIDER NOTES
[facility] EMERGENCY DEPARTMENT ([facility])  4/23/22  History     Chief Complaint   Patient presents with     Headache     For a week: prescribed meds from urgent care and not helping; never had these headaches before. Some light and noise sensitivity. Feeling ill lately also. Now feeling better.      The history is provided by the patient.     Kat Rodriguez is a 21 year old female who has a significant medical history of asthma who presents to the Emergency Department with c/o persistent headache ongoing for 1 week.  The headache worsens with ambulating and motion.  She has tried taking Tylenol and Excedrin for the headache, with no relief.  She states that the headache was more severe initially, but has slightly decreased in severity and has remained constant.  Headache was initially generalized and bilateral, in now persists on the right side from the temporal to the occipital regions.  She reports intermittent shooting pain in her neck and facial tenderness.  She also reports some mild photophobia.  Patient states that the headache was initially accompanied by cough, runny nose, and congestion.  His infectious symptoms have resolved, but the headache has persisted along with a mild cough.  She denies sneezing. She denies history of headaches. She had a negative PCR COVID swab on 4/20/2022.  She reports being fully vaccinated for COVID-19.  She reports being worked up at [facility] urgent [facility] in [place] yesterday, she was prescribed amoxicillin and Excedrin for possible sinusitis.  She was also administered Toradol injection for the headache, but with no relief.  She reports taking amoxicillin and Excedrin today, with no relief of symptoms. She denies fever, but reports hot and cold flashes.  She denies chance of pregnancy, last menstrual period was 1 month ago, she reports having history of irregular menstrual periods.  She denies nausea or vomiting.    Past Medical History  Past Medical  History:   Diagnosis Date     Asthma      Past Surgical History:   Procedure Laterality Date     ARTHROPLASTY REVISION KNEE Left 03/2018    ACL repair     ARTHROSCOPIC RECONSTRUCTION ANTERIOR CRUCIATE LIGAMENT Right 1/29/2015    Procedure: ARTHROSCOPIC RECONSTRUCTION ANTERIOR CRUCIATE LIGAMENT;  Surgeon: Emile Menendez MD;  Location: [facility] OR     acetaminophen (TYLENOL) 500 MG tablet  albuterol (PROAIR HFA) 108 (90 Base) MCG/ACT inhaler  amoxicillin (AMOXIL) 875 MG tablet  cetirizine (ZYRTEC) 10 MG tablet  cyclobenzaprine (FLEXERIL) 10 MG tablet  EPINEPHrine (ANY BX GENERIC EQUIV) 0.3 MG/0.3ML injection 2-pack  EPINEPHrine (EPIPEN) 0.3 MG/0.3ML injection  fluticasone (FLONASE) 50 MCG/ACT nasal spray  methocarbamol (ROBAXIN) 500 MG tablet  naproxen (NAPROSYN) 500 MG tablet  phentermine (ADIPEX-P) 15 MG capsule  Spacer/Aero Chamber Mouthpiece MISC  vitamin D2 (ERGOCALCIFEROL) 41084 units (1250 mcg) capsule      Allergies   Allergen Reactions     Cats Itching     Fish      Nuts      Tree nuts, not peanuts     Trees Swelling     Family History  Family History   Problem Relation Age of Onset     Obesity Mother      Hypertension Maternal Grandmother      Hyperlipidemia Maternal Grandmother      Obesity Maternal Grandfather      Social History   Social History     Tobacco Use     Smoking status: Passive Smoke Exposure - Never Smoker     Smokeless tobacco: Never Used     Tobacco comment: father smokes   Substance Use Topics     Alcohol use: No     Drug use: No      Past medical history, past surgical history, medications, allergies, family history, and social history were reviewed with the patient. No additional pertinent items.     I have reviewed the Medications, Allergies, Past Medical and Surgical History, and Social History in the Epic system.    Review of Systems   Constitutional: Positive for chills. Negative for fever.   HENT: Negative for congestion, rhinorrhea, sneezing and sore throat.    Eyes: Positive for  photophobia.   Respiratory: Positive for cough. Negative for shortness of breath.    Gastrointestinal: Negative for nausea and vomiting.   Musculoskeletal: Positive for neck pain.   Neurological: Positive for headaches.     A complete review of systems was performed with pertinent positives and negatives noted in the HPI, and all other systems negative.    Physical Exam   BP: 116/81  Pulse: 75  Temp: 98  F (36.7  C)  Resp: 16  SpO2: 98 %      Physical Exam  Constitutional:       General: She is not in acute distress.     Appearance: She is well-developed. She is not ill-appearing, toxic-appearing or diaphoretic.   HENT:      Head: Normocephalic and atraumatic.   Eyes:      Extraocular Movements: Extraocular movements intact.      Conjunctiva/sclera: Conjunctivae normal.      Pupils: Pupils are equal, round, and reactive to light.   Cardiovascular:      Rate and Rhythm: Normal rate and regular rhythm.      Heart sounds: Normal heart sounds.   Pulmonary:      Effort: Pulmonary effort is normal. No respiratory distress.      Breath sounds: Normal breath sounds.   Abdominal:      General: There is no distension.      Palpations: Abdomen is soft.      Tenderness: There is no abdominal tenderness. There is no rebound.   Musculoskeletal:         General: No tenderness.      Cervical back: Normal range of motion and neck supple. No rigidity or tenderness.      Right lower leg: No edema.      Left lower leg: No edema.   Skin:     General: Skin is warm and dry.   Neurological:      General: No focal deficit present.      Mental Status: She is alert and oriented to person, place, and time.      Cranial Nerves: No cranial nerve deficit.      Sensory: No sensory deficit.      Motor: No weakness.   Psychiatric:         Behavior: Behavior normal.         Thought Content: Thought content normal.         ED Course     At 5:45 PM the patient was seen and examined by Shira Gonzalez MD in Room ED08.        Procedures                The medical record was reviewed and interpreted.  Current labs reviewed and interpreted.     No results found for this or any previous visit (from the past 24 hour(s)).  Medications - No data to display         Results for orders placed or performed during the hospital encounter of 04/23/22   Head CT w/o contrast     Status: None    Narrative    EXAM: CT HEAD W/O CONTRAST  LOCATION: [facility]  DATE/TIME: 4/23/2022 7:21 PM    INDICATION: Headache, tension-type  COMPARISON: None.  TECHNIQUE: Routine CT Head without IV contrast. Multiplanar reformats. Dose reduction techniques were used.    FINDINGS:  INTRACRANIAL CONTENTS: No intracranial hemorrhage, extraaxial collection, or mass effect.  No CT evidence of acute infarct. Normal parenchymal attenuation. Normal ventricles and sulci. Incidentally noted is cavum septum pellucidum et vergae.     VISUALIZED ORBITS/SINUSES/MASTOIDS: No intraorbital abnormality. No paranasal sinus mucosal disease. No middle ear or mastoid effusion.    BONES/SOFT TISSUES: No acute abnormality.      Impression    IMPRESSION:  1.  Unremarkable noncontrast head CT. No acute intracranial pathology.   Basic metabolic panel     Status: Abnormal   Result Value Ref Range    Sodium 141 133 - 144 mmol/L    Potassium 4.1 3.4 - 5.3 mmol/L    Chloride 112 (H) 94 - 109 mmol/L    Carbon Dioxide (CO2) 23 20 - 32 mmol/L    Anion Gap 6 3 - 14 mmol/L    Urea Nitrogen 19 7 - 30 mg/dL    Creatinine 0.83 0.52 - 1.04 mg/dL    Calcium 9.3 8.5 - 10.1 mg/dL    Glucose 81 70 - 99 mg/dL    GFR Estimate >90 >60 mL/min/1.73m2   CBC with platelets and differential     Status: None   Result Value Ref Range    WBC Count 6.7 4.0 - 11.0 10e3/uL    RBC Count 4.96 3.80 - 5.20 10e6/uL    Hemoglobin 13.5 11.7 - 15.7 g/dL    Hematocrit 41.2 35.0 - 47.0 %    MCV 83 78 - 100 fL    MCH 27.2 26.5 - 33.0 pg    MCHC 32.8 31.5 - 36.5 g/dL    RDW 12.0 10.0 - 15.0 %    Platelet Count 323  150 - 450 10e3/uL    % Neutrophils 47 %    % Lymphocytes 44 %    % Monocytes 7 %    % Eosinophils 2 %    % Basophils 0 %    % Immature Granulocytes 0 %    NRBCs per 100 WBC 0 <1 /100    Absolute Neutrophils 3.1 1.6 - 8.3 10e3/uL    Absolute Lymphocytes 3.0 0.8 - 5.3 10e3/uL    Absolute Monocytes 0.5 0.0 - 1.3 10e3/uL    Absolute Eosinophils 0.1 0.0 - 0.7 10e3/uL    Absolute Basophils 0.0 0.0 - 0.2 10e3/uL    Absolute Immature Granulocytes 0.0 <=0.4 10e3/uL    Absolute NRBCs 0.0 10e3/uL   Symptomatic; Yes; 5/15/2022 Influenza A/B & SARS-CoV2 (COVID-19) Virus PCR Multiplex Nasopharyngeal     Status: Normal    Specimen: Nasopharyngeal; Swab   Result Value Ref Range    Influenza A PCR Negative Negative    Influenza B PCR Negative Negative    SARS CoV2 PCR Negative Negative    Narrative    Testing was performed using the daphne SARS-CoV-2 & Influenza A/B Assay on the daphne Sharri System. This test should be ordered for the detection of SARS-CoV-2 and influenza viruses in individuals who meet clinical and/or epidemiological criteria. Test performance is unknown in asymptomatic patients. This test is for in vitro diagnostic use under the FDA EUA for laboratories certified under CLIA to perform moderate and/or high complexity testing. This test has not been FDA cleared or approved. A negative result does not rule out the presence of PCR inhibitors in the specimen or target RNA in concentration below the limit of detection for the assay. If only one viral target is positive but coinfection with multiple targets is suspected, the sample should be re-tested with another FDA cleared, approved or authorized test, if coinfection would change clinical management. [facility] are certified under the Clinical Laboratory Improvement Amendments of 1988 (CLIA-88) as  qualified to perform moderate and/or high complexity laboratory testing.   CBC with platelets differential     Status: None    Narrative    The following  orders were created for panel order CBC with platelets differential.  Procedure                               Abnormality         Status                     ---------                               -----------         ------                     CBC with platelets and d...[[id number]]                      Final result                 Please view results for these tests on the individual orders.     Medications   0.9% sodium chloride BOLUS (0 mLs Intravenous Stopped 4/23/22 1909)     Followed by   sodium chloride 0.9% infusion (has no administration in time range)   ondansetron (ZOFRAN) injection 4 mg (has no administration in time range)   ketorolac (TORADOL) injection 15 mg (15 mg Intravenous Given 4/23/22 1815)   diphenhydrAMINE (BENADRYL) injection 50 mg (50 mg Intravenous Given 4/23/22 1815)   HYDROcodone-acetaminophen (NORCO) 5-325 MG per tablet 1 tablet (1 tablet Oral Given 4/23/22 2120)         Assessments & Plan (with Medical Decision Making)   Kat Rodriguez is a 21 year old female who presents to the ER complaining of intermittent headache for the past 1 week.  Patient was seen in a urgent care 2 days prior for similar headache.  Patient says that she was prescribed amoxicillin and has been taking it since yesterday but has not relieved her symptoms.  Patient here has no sinus tenderness.  Initial concern for possible viral meningitis with patient has no neck stiffness no neck pain and no fever.  Could be that patient has a postviral headache still ongoing.  We check labs and a COVID and influenza which were all negative.  Due to her note never had any previous head imaging we did do a CT head which is also normal.  Patient received some fluid and Toradol and is feeling better.  Results of the lab work and CT were discussed.  At this time I feel she has a nonspecific headache and do not recommend any further treatment in the hospital.  Patient is to follow-up with her PCP for further care.    I have  reviewed the nursing notes.    I have reviewed the findings, diagnosis, plan and need for follow up with the patient.    New Prescriptions    No medications on file       Final diagnoses:   Tension headache       I, Henrique Menendez am serving as a trained medical scribe to document services personally performed by Shira Gonzalez, based on the provider's statements to me.      I, Shira Gonzalez, was physically present and have reviewed and verified the accuracy of this note documented by Henrique Menendez.     Shira Gonzalez MD  4/23/2022   [facility] EMERGENCY DEPARTMENT     Shira Gonzalez MD  04/23/22 6015

## 2022-04-24 NOTE — DISCHARGE INSTRUCTIONS
Your blood work is stable.     Your CT head is normal.     Drink lots of fluid and take ibuprofen/tylenol for headache.     Please make an appointment to follow up with Primary Care - [facility] (phone: [phone]) in 3-5 days even if entirely better.

## 2022-10-07 ENCOUNTER — LAB REQUISITION (OUTPATIENT)
Dept: LAB | Facility: CLINIC | Age: 22
End: 2022-10-07

## 2022-10-07 PROCEDURE — 86765 RUBEOLA ANTIBODY: CPT | Performed by: INTERNAL MEDICINE

## 2022-10-07 PROCEDURE — 86735 MUMPS ANTIBODY: CPT | Performed by: INTERNAL MEDICINE

## 2022-10-07 PROCEDURE — 86481 TB AG RESPONSE T-CELL SUSP: CPT | Performed by: INTERNAL MEDICINE

## 2022-10-07 PROCEDURE — 86762 RUBELLA ANTIBODY: CPT | Performed by: INTERNAL MEDICINE

## 2022-10-07 PROCEDURE — 86706 HEP B SURFACE ANTIBODY: CPT | Performed by: INTERNAL MEDICINE

## 2022-10-09 LAB
GAMMA INTERFERON BACKGROUND BLD IA-ACNC: 0.05 IU/ML
HBV SURFACE AB SERPL IA-ACNC: 9.77 M[IU]/ML
HBV SURFACE AB SERPL IA-ACNC: NORMAL M[IU]/ML
M TB IFN-G BLD-IMP: NEGATIVE
M TB IFN-G CD4+ BCKGRND COR BLD-ACNC: 9.95 IU/ML
MITOGEN IGNF BCKGRD COR BLD-ACNC: 0 IU/ML
MITOGEN IGNF BCKGRD COR BLD-ACNC: 0.02 IU/ML
QUANTIFERON MITOGEN: 10 IU/ML
QUANTIFERON NIL TUBE: 0.05 IU/ML
QUANTIFERON TB1 TUBE: 0.07 IU/ML
QUANTIFERON TB2 TUBE: 0.05

## 2022-10-10 LAB
MEV IGG SER IA-ACNC: 236 AU/ML
MEV IGG SER IA-ACNC: POSITIVE
MUMPS ANTIBODY IGG INSTRUMENT VALUE: 11.2 AU/ML
MUV IGG SER QL IA: POSITIVE
RUBV IGG SERPL QL IA: 3.14 INDEX
RUBV IGG SERPL QL IA: POSITIVE

## 2022-11-19 ENCOUNTER — HEALTH MAINTENANCE LETTER (OUTPATIENT)
Age: 22
End: 2022-11-19

## 2023-04-12 ENCOUNTER — NURSE TRIAGE (OUTPATIENT)
Dept: NURSING | Facility: CLINIC | Age: 23
End: 2023-04-12

## 2023-04-12 NOTE — TELEPHONE ENCOUNTER
Writer reviewed chart, patient has not been seen by primary care in over 3 years - patient requires provider visit to discuss treatment.    Called patient and relayed information above, patient verbalized understanding. Virtual UC visit scheduled for later today to discuss tx options for COVID. Patient aware of appt time.    Patient also asked to schedule appt to re-establish care in system, new patient slot with different provider (per patient request) scheduled for later this month. No further questions or concerns.    Edit: did reach out to provider re: new patient slot, asked to move patient to different time that day. Rescheduled patient from 4/20/23 8:40 AM appt to 10:40 AM appt same day. Called patient and left VM with this information, clinic call back number also left if patient has any further questions or needs to switch appt times as well. Will also send MyC message with updated appt time.      JOSE GUADALUPE Coles, RN  [facility]

## 2023-04-12 NOTE — TELEPHONE ENCOUNTER
Triage Call:     Pt calling to report that she tested positive for COVID-19 x2 today with at home tests.   Sx started two days ago    Sx:   Scratchy throat and congestion  Stuffy nose  97.6  Coughed up blood-tinged sputum x1    Pt reports a diagnosis of Asthma    COVID Positive/Requesting COVID treatment    Patient is positive for COVID and requesting treatment options.    Date of positive COVID test (PCR or at home)? At home test x2 on 4/12/2023    Current COVID symptoms: cough, sore throat and congestion or runny nose  Date COVID symptoms began: 4/10/2023    Message should be routed to clinic RN pool. Best phone number to use for call back: [phone]  Reason for Disposition    [1] HIGH RISK for severe COVID complications (e.g., weak immune system, age > 64 years, obesity with BMI of 30 or higher, pregnant, chronic lung disease or other chronic medical condition) AND [2] COVID symptoms (e.g., cough, fever)  (Exceptions: Already seen by PCP and no new or worsening symptoms.)    Additional Information    Negative: SEVERE difficulty breathing (e.g., struggling for each breath, speaks in single words)    Negative: Difficult to awaken or acting confused (e.g., disoriented, slurred speech)    Negative: Bluish (or gray) lips or face now    Negative: Shock suspected (e.g., cold/pale/clammy skin, too weak to stand, low BP, rapid pulse)    Negative: Sounds like a life-threatening emergency to the triager    Negative: SEVERE or constant chest pain or pressure  (Exception: Mild central chest pain, present only when coughing.)    Negative: MODERATE difficulty breathing (e.g., speaks in phrases, SOB even at rest, pulse 100-120)    Negative: Headache and stiff neck (can't touch chin to chest)    Negative: Oxygen level (e.g., pulse oximetry) 90 percent or lower    Negative: Chest pain or pressure  (Exception: MILD central chest pain, present only when coughing)    Negative: Patient sounds very sick or weak to the triager     Negative: MILD difficulty breathing (e.g., minimal/no SOB at rest, SOB with walking, pulse <100)    Negative: Fever > 103 F (39.4 C)    Negative: [1] Fever > 101 F (38.3 C) AND [2] over 60 years of age    Negative: [1] Fever > 100.0 F (37.8 C) AND [2] bedridden (e.g., nursing home patient, CVA, chronic illness, recovering from surgery)    Protocols used: CORONAVIRUS (COVID-19) DIAGNOSED OR SUFEPVAXD-W-OS    Yoon Ochoa RN  [facility] Nurse Advisor 12:25 PM 4/12/2023

## 2023-04-15 ENCOUNTER — HEALTH MAINTENANCE LETTER (OUTPATIENT)
Age: 23
End: 2023-04-15

## 2023-05-24 ENCOUNTER — ANCILLARY PROCEDURE (OUTPATIENT)
Dept: GENERAL RADIOLOGY | Facility: CLINIC | Age: 23
End: 2023-05-24
Attending: FAMILY MEDICINE
Payer: COMMERCIAL

## 2023-05-24 ENCOUNTER — OFFICE VISIT (OUTPATIENT)
Dept: FAMILY MEDICINE | Facility: CLINIC | Age: 23
End: 2023-05-24
Payer: COMMERCIAL

## 2023-05-24 VITALS
DIASTOLIC BLOOD PRESSURE: 81 MMHG | HEART RATE: 94 BPM | SYSTOLIC BLOOD PRESSURE: 112 MMHG | RESPIRATION RATE: 15 BRPM | HEIGHT: 62 IN | OXYGEN SATURATION: 96 % | WEIGHT: 254 LBS | TEMPERATURE: 97.9 F | BODY MASS INDEX: 46.74 KG/M2

## 2023-05-24 DIAGNOSIS — Z11.59 NEED FOR HEPATITIS C SCREENING TEST: ICD-10-CM

## 2023-05-24 DIAGNOSIS — N92.6 IRREGULAR MENSES: ICD-10-CM

## 2023-05-24 DIAGNOSIS — Z11.4 SCREENING FOR HUMAN IMMUNODEFICIENCY VIRUS: ICD-10-CM

## 2023-05-24 DIAGNOSIS — R41.840 ATTENTION DEFICIT: ICD-10-CM

## 2023-05-24 DIAGNOSIS — J45.30 MILD PERSISTENT ASTHMA WITHOUT COMPLICATION: ICD-10-CM

## 2023-05-24 DIAGNOSIS — J30.81 CHRONIC ALLERGIC RHINITIS DUE TO ANIMAL HAIR AND DANDER: ICD-10-CM

## 2023-05-24 DIAGNOSIS — M79.644 PAIN OF FINGER OF RIGHT HAND: ICD-10-CM

## 2023-05-24 DIAGNOSIS — Z91.013 FISH ALLERGY: ICD-10-CM

## 2023-05-24 DIAGNOSIS — E66.01 CLASS 3 SEVERE OBESITY WITHOUT SERIOUS COMORBIDITY WITH BODY MASS INDEX (BMI) OF 45.0 TO 49.9 IN ADULT, UNSPECIFIED OBESITY TYPE (H): ICD-10-CM

## 2023-05-24 DIAGNOSIS — Z23 ENCOUNTER FOR VACCINATION: ICD-10-CM

## 2023-05-24 DIAGNOSIS — Z12.4 CERVICAL CANCER SCREENING: ICD-10-CM

## 2023-05-24 DIAGNOSIS — E66.813 CLASS 3 SEVERE OBESITY WITHOUT SERIOUS COMORBIDITY WITH BODY MASS INDEX (BMI) OF 45.0 TO 49.9 IN ADULT, UNSPECIFIED OBESITY TYPE (H): ICD-10-CM

## 2023-05-24 DIAGNOSIS — F32.A DEPRESSION, UNSPECIFIED DEPRESSION TYPE: ICD-10-CM

## 2023-05-24 DIAGNOSIS — Z91.018 ALLERGY TO TREE NUTS: ICD-10-CM

## 2023-05-24 DIAGNOSIS — J35.8 TONSIL STONE: ICD-10-CM

## 2023-05-24 DIAGNOSIS — Z11.3 SCREEN FOR STD (SEXUALLY TRANSMITTED DISEASE): ICD-10-CM

## 2023-05-24 DIAGNOSIS — Z00.00 ROUTINE GENERAL MEDICAL EXAMINATION AT A HEALTH CARE FACILITY: Primary | ICD-10-CM

## 2023-05-24 PROCEDURE — 96127 BRIEF EMOTIONAL/BEHAV ASSMT: CPT | Performed by: FAMILY MEDICINE

## 2023-05-24 PROCEDURE — 99214 OFFICE O/P EST MOD 30 MIN: CPT | Mod: 25 | Performed by: FAMILY MEDICINE

## 2023-05-24 PROCEDURE — 87389 HIV-1 AG W/HIV-1&-2 AB AG IA: CPT | Performed by: FAMILY MEDICINE

## 2023-05-24 PROCEDURE — 73140 X-RAY EXAM OF FINGER(S): CPT | Mod: TC | Performed by: RADIOLOGY

## 2023-05-24 PROCEDURE — G0145 SCR C/V CYTO,THINLAYER,RESCR: HCPCS | Performed by: FAMILY MEDICINE

## 2023-05-24 PROCEDURE — 36415 COLL VENOUS BLD VENIPUNCTURE: CPT | Performed by: FAMILY MEDICINE

## 2023-05-24 PROCEDURE — 86803 HEPATITIS C AB TEST: CPT | Performed by: FAMILY MEDICINE

## 2023-05-24 PROCEDURE — 90471 IMMUNIZATION ADMIN: CPT | Performed by: FAMILY MEDICINE

## 2023-05-24 PROCEDURE — 90677 PCV20 VACCINE IM: CPT | Performed by: FAMILY MEDICINE

## 2023-05-24 PROCEDURE — 99395 PREV VISIT EST AGE 18-39: CPT | Mod: 25 | Performed by: FAMILY MEDICINE

## 2023-05-24 PROCEDURE — 86696 HERPES SIMPLEX TYPE 2 TEST: CPT | Performed by: FAMILY MEDICINE

## 2023-05-24 PROCEDURE — 86695 HERPES SIMPLEX TYPE 1 TEST: CPT | Performed by: FAMILY MEDICINE

## 2023-05-24 RX ORDER — EPINEPHRINE 0.3 MG/.3ML
0.3 INJECTION SUBCUTANEOUS PRN
Qty: 2 EACH | Refills: 0 | Status: SHIPPED | OUTPATIENT
Start: 2023-05-24

## 2023-05-24 RX ORDER — CETIRIZINE HYDROCHLORIDE 10 MG/1
10 TABLET ORAL DAILY PRN
Qty: 90 TABLET | Refills: 3 | Status: SHIPPED | OUTPATIENT
Start: 2023-05-24

## 2023-05-24 RX ORDER — ALBUTEROL SULFATE 90 UG/1
2 AEROSOL, METERED RESPIRATORY (INHALATION) EVERY 4 HOURS PRN
Qty: 18 G | Refills: 1 | Status: SHIPPED | OUTPATIENT
Start: 2023-05-24

## 2023-05-24 ASSESSMENT — PAIN SCALES - GENERAL: PAINLEVEL: MILD PAIN (3)

## 2023-05-24 ASSESSMENT — ENCOUNTER SYMPTOMS
JOINT SWELLING: 0
SHORTNESS OF BREATH: 0
PARESTHESIAS: 0
MYALGIAS: 0
ARTHRALGIAS: 1
DIARRHEA: 0
NAUSEA: 0
HEMATURIA: 0
BREAST MASS: 0
HEMATOCHEZIA: 1
DIZZINESS: 0
DYSURIA: 0
COUGH: 0
FEVER: 0
HEARTBURN: 0
PALPITATIONS: 0
EYE PAIN: 0
ABDOMINAL PAIN: 0
SORE THROAT: 0
HEADACHES: 0
NERVOUS/ANXIOUS: 0
WEAKNESS: 0
CONSTIPATION: 0
FREQUENCY: 0
CHILLS: 1

## 2023-05-24 ASSESSMENT — PATIENT HEALTH QUESTIONNAIRE - PHQ9
SUM OF ALL RESPONSES TO PHQ QUESTIONS 1-9: 10
SUM OF ALL RESPONSES TO PHQ QUESTIONS 1-9: 10
10. IF YOU CHECKED OFF ANY PROBLEMS, HOW DIFFICULT HAVE THESE PROBLEMS MADE IT FOR YOU TO DO YOUR WORK, TAKE CARE OF THINGS AT HOME, OR GET ALONG WITH OTHER PEOPLE: SOMEWHAT DIFFICULT

## 2023-05-24 ASSESSMENT — ASTHMA QUESTIONNAIRES
QUESTION_3 LAST FOUR WEEKS HOW OFTEN DID YOUR ASTHMA SYMPTOMS (WHEEZING, COUGHING, SHORTNESS OF BREATH, CHEST TIGHTNESS OR PAIN) WAKE YOU UP AT NIGHT OR EARLIER THAN USUAL IN THE MORNING: NOT AT ALL
ACT_TOTALSCORE: 24
QUESTION_1 LAST FOUR WEEKS HOW MUCH OF THE TIME DID YOUR ASTHMA KEEP YOU FROM GETTING AS MUCH DONE AT WORK, SCHOOL OR AT HOME: NONE OF THE TIME
QUESTION_2 LAST FOUR WEEKS HOW OFTEN HAVE YOU HAD SHORTNESS OF BREATH: NOT AT ALL
QUESTION_5 LAST FOUR WEEKS HOW WOULD YOU RATE YOUR ASTHMA CONTROL: WELL CONTROLLED
QUESTION_4 LAST FOUR WEEKS HOW OFTEN HAVE YOU USED YOUR RESCUE INHALER OR NEBULIZER MEDICATION (SUCH AS ALBUTEROL): NOT AT ALL
ACT_TOTALSCORE: 24
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
## 2023-05-24 NOTE — NURSING NOTE
Prior to immunization administration, verified patients identity using patient s name and date of birth. Please see Immunization Activity for additional information.     Screening Questionnaire for Adult Immunization    Are you sick today?   No   Do you have allergies to medications, food, a vaccine component or latex?   Yes  Cats Not Specified Itching    Fish Not Specified     Nuts Not Specified  Tree nuts, not peanuts   Trees             Have you ever had a serious reaction after receiving a vaccination?   No   Do you have a long-term health problem with heart, lung, kidney, or metabolic disease (e.g., diabetes), asthma, a blood disorder, no spleen, complement component deficiency, a cochlear implant, or a spinal fluid leak?  Are you on long-term aspirin therapy?   No   Do you have cancer, leukemia, HIV/AIDS, or any other immune system problem?   No   Do you have a parent, brother, or sister with an immune system problem?   No   In the past 3 months, have you taken medications that affect  your immune system, such as prednisone, other steroids, or anticancer drugs; drugs for the treatment of rheumatoid arthritis, Crohn s disease, or psoriasis; or have you had radiation treatments?   No   Have you had a seizure, or a brain or other nervous system problem?   No   During the past year, have you received a transfusion of blood or blood    products, or been given immune (gamma) globulin or antiviral drug?   No   For women: Are you pregnant or is there a chance you could become       pregnant during the next month?   No   Have you received any vaccinations in the past 4 weeks?   No     Immunization questionnaire was positive for at least one answer.  Notified JULIO.      Injection of  PCV20 given by Holden Doan MA. Patient instructed to remain in clinic for 15 minutes afterwards, and to report any adverse reactions.     Screening performed by Holden Doan MA on 5/24/2023 at 3:40 PM.

## 2023-05-25 DIAGNOSIS — M79.644 PAIN OF FINGER OF RIGHT HAND: Primary | ICD-10-CM

## 2023-05-25 LAB
HCV AB SERPL QL IA: NONREACTIVE
HIV 1+2 AB+HIV1 P24 AG SERPL QL IA: NONREACTIVE
HSV1 IGG SERPL QL IA: 0.26 INDEX
HSV1 IGG SERPL QL IA: NORMAL
HSV2 IGG SERPL QL IA: 0.11 INDEX
HSV2 IGG SERPL QL IA: NORMAL

## 2023-05-28 LAB
BKR LAB AP GYN ADEQUACY: NORMAL
BKR LAB AP GYN INTERPRETATION: NORMAL
BKR LAB AP HPV REFLEX: NO
BKR LAB AP PREVIOUS ABNORMAL: NORMAL
PATH REPORT.COMMENTS IMP SPEC: NORMAL
PATH REPORT.COMMENTS IMP SPEC: NORMAL
PATH REPORT.RELEVANT HX SPEC: NORMAL

## 2023-06-28 ENCOUNTER — OFFICE VISIT (OUTPATIENT)
Dept: FAMILY MEDICINE | Facility: CLINIC | Age: 23
End: 2023-06-28
Payer: COMMERCIAL

## 2023-06-28 VITALS
BODY MASS INDEX: 49.14 KG/M2 | DIASTOLIC BLOOD PRESSURE: 68 MMHG | OXYGEN SATURATION: 98 % | RESPIRATION RATE: 16 BRPM | TEMPERATURE: 98.1 F | HEIGHT: 61 IN | WEIGHT: 260.3 LBS | HEART RATE: 77 BPM | SYSTOLIC BLOOD PRESSURE: 118 MMHG

## 2023-06-28 DIAGNOSIS — E66.01 MORBID OBESITY WITH BMI OF 45.0-49.9, ADULT (H): Primary | ICD-10-CM

## 2023-06-28 PROCEDURE — 99215 OFFICE O/P EST HI 40 MIN: CPT | Performed by: FAMILY MEDICINE

## 2023-06-28 ASSESSMENT — ANXIETY QUESTIONNAIRES
3. WORRYING TOO MUCH ABOUT DIFFERENT THINGS: NEARLY EVERY DAY
8. IF YOU CHECKED OFF ANY PROBLEMS, HOW DIFFICULT HAVE THESE MADE IT FOR YOU TO DO YOUR WORK, TAKE CARE OF THINGS AT HOME, OR GET ALONG WITH OTHER PEOPLE?: SOMEWHAT DIFFICULT
5. BEING SO RESTLESS THAT IT IS HARD TO SIT STILL: SEVERAL DAYS
7. FEELING AFRAID AS IF SOMETHING AWFUL MIGHT HAPPEN: NOT AT ALL
GAD7 TOTAL SCORE: 10
4. TROUBLE RELAXING: NOT AT ALL
IF YOU CHECKED OFF ANY PROBLEMS ON THIS QUESTIONNAIRE, HOW DIFFICULT HAVE THESE PROBLEMS MADE IT FOR YOU TO DO YOUR WORK, TAKE CARE OF THINGS AT HOME, OR GET ALONG WITH OTHER PEOPLE: SOMEWHAT DIFFICULT
6. BECOMING EASILY ANNOYED OR IRRITABLE: MORE THAN HALF THE DAYS
2. NOT BEING ABLE TO STOP OR CONTROL WORRYING: NEARLY EVERY DAY
GAD7 TOTAL SCORE: 10
7. FEELING AFRAID AS IF SOMETHING AWFUL MIGHT HAPPEN: NOT AT ALL
GAD7 TOTAL SCORE: 10
1. FEELING NERVOUS, ANXIOUS, OR ON EDGE: SEVERAL DAYS

## 2023-06-28 ASSESSMENT — PATIENT HEALTH QUESTIONNAIRE - PHQ9
10. IF YOU CHECKED OFF ANY PROBLEMS, HOW DIFFICULT HAVE THESE PROBLEMS MADE IT FOR YOU TO DO YOUR WORK, TAKE CARE OF THINGS AT HOME, OR GET ALONG WITH OTHER PEOPLE: SOMEWHAT DIFFICULT
SUM OF ALL RESPONSES TO PHQ QUESTIONS 1-9: 13
SUM OF ALL RESPONSES TO PHQ QUESTIONS 1-9: 13

## 2023-06-28 ASSESSMENT — PAIN SCALES - GENERAL: PAINLEVEL: NO PAIN (0)

## 2023-06-28 NOTE — PROGRESS NOTES
Assessment & Plan     Morbid obesity with BMI of 45.0-49.9, adult (H)  -Pt good candidate for weight loss therapy with Weygovy. She is motivated to make changes to her diet and lifestyle. We discussed side effects of Wegovy and how to use it. Will start with 0.25 mg a day for 4 weeks.  -Patient's goals: going to gym 3 times a week with 30 minutes of walking on treadmill. She also plans to start meal prepping. Discussed heathy food options, if using canned beans rinse first. Incorporate more veg and lean meat. Modify carb intake.  -We will follow-up in 6 weeks for monitoring as it will likely take 1-2 week for insurance to approve med.   - Semaglutide-Weight Management (WEGOVY) 0.25 MG/0.5ML pen  Dispense: 2 mL; Refill: 0      40 minutes spent by me on the date of the encounter doing chart review, history and exam, documentation and further activities per the note.    Jv Shaikh DO  [facility]    Renae Stephens is a 22 year old, presenting for the following health issues:  Follow Up (To talk about weight loss and thinking about doing injection for weight loss. )        6/28/2023    10:31 AM   Additional Questions   Roomed by ameya carias   Accompanied by self         6/28/2023    10:31 AM   Patient Reported Additional Medications   Patient reports taking the following new medications none     Patient here to discuss help for weight loss.  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  Endorses that her eating habits are not the best. Gravitates towards pre-made and frozen meals. She is heavily reliant on carbs as the main source for most meals. Not big on sweets. Eats mostly red meat. Eats about one large meal a day. Ha difficulty affording healthier food options. Shops at [employer] and [employer]. Monet's mom sometimes cooks but may not always be healthy, feels like she needs to eat it to avoid being rude. Drinks energy drinks and water, no soda. Occasional  "etoh.    Has a gym membership, goes when she has time. Has difficulty with staying physically active due to her weight and knee pain. Has had surgery on both knees prior.    She is interested in try injectable weight loss medication.       History of Present Illness       Reason for visit:  Weight loss    She eats 0-1 servings of fruits and vegetables daily.She consumes 0 sweetened beverage(s) daily.She exercises with enough effort to increase her heart rate 30 to 60 minutes per day.  She exercises with enough effort to increase her heart rate 3 or less days per week.   She is taking medications regularly.    Today's PHQ-9         PHQ-9 Total Score: 13    PHQ-9 Q9 Thoughts of better off dead/self-harm past 2 weeks :   Not at all    How difficult have these problems made it for you to do your work, take care of things at home, or get along with other people: Somewhat difficult  Today's MIKE-7 Score: 10     Review of Systems         Objective    /68 (BP Location: Right arm, Patient Position: Sitting, Cuff Size: Adult Large)   Pulse 77   Temp 98.1  F (36.7  C) (Temporal)   Resp 16   Ht 1.549 m (5' 1\")   Wt 118.1 kg (260 lb 4.8 oz)   SpO2 98%   BMI 49.18 kg/m    Body mass index is 49.18 kg/m .  Physical Exam   GENERAL: alert, no distress and obese  RESP: breathing comfortably, no acute respiratory distress  NEURO: Normal strength and tone, mentation intact and speech normal  PSYCH: mentation appears normal, affect normal/bright                    "

## 2023-07-07 DIAGNOSIS — E66.01 MORBID OBESITY WITH BMI OF 45.0-49.9, ADULT (H): ICD-10-CM

## 2023-07-07 NOTE — TELEPHONE ENCOUNTER
Patient would like wegovy filled at [facility] in [place]. When we called [facility] to have the script transferred, [facility] stated that they did not have any prescriptions for wegovy on file. Can you re-send to [facility] in [place]?    Thank you,  Cheyenne Ortega, PharmD  [facility] [place]  P: [phone]

## 2023-07-11 ENCOUNTER — TELEPHONE (OUTPATIENT)
Dept: FAMILY MEDICINE | Facility: CLINIC | Age: 23
End: 2023-07-11
Payer: COMMERCIAL

## 2023-07-11 NOTE — TELEPHONE ENCOUNTER
Thank you,  Bk Castaneda, [facility]  [facility]  @~~~~~~

## 2023-07-13 NOTE — TELEPHONE ENCOUNTER
PA Initiation    Medication: WEGOVY 0.25 MG/0.5ML SC SOAJ  Insurance Company: [employer] - Phone [phone] Fax [phone]  Pharmacy Filling the Rx: [place], [place] - [facility]  Filling Pharmacy Phone: [phone]  Filling Pharmacy Fax:    Start Date: 7/13/2023    Central Prior Authorization Team   Phone: [phone]

## 2023-07-14 NOTE — TELEPHONE ENCOUNTER
Prior Authorization Approval    Medication: WEGOVY 0.25 MG/0.5ML SC SOAJ  0.5mg 1mg and 1.7mg have also been approved Total of 7 Fills for all  Authorization Effective Date: 7/14/2023  Authorization Expiration Date: 2/2/2024    Approved Dose/Quantity: 2mls per 28 days  Reference #:     Insurance Company: [employer] [phone] Fax [phone]  Expected CoPay:       CoPay Card Available:      Financial Assistance Needed:   Which Pharmacy is filling the prescription: [facility] [place], [place] - [address]  Pharmacy Notified: Yes  Patient Notified: Yes

## 2023-07-17 ENCOUNTER — MYC MEDICAL ADVICE (OUTPATIENT)
Dept: FAMILY MEDICINE | Facility: CLINIC | Age: 23
End: 2023-07-17
Payer: COMMERCIAL

## 2023-07-17 DIAGNOSIS — E66.01 MORBID OBESITY WITH BMI OF 45.0-49.9, ADULT (H): Primary | ICD-10-CM

## 2023-07-20 NOTE — TELEPHONE ENCOUNTER
Patient called, anxious to get an alternative to Wegovy.  Her copay would be $1,500.  Is there something else she could try?  Pharmacy pended  Alba Landa RN  [facility]

## 2023-07-20 NOTE — TELEPHONE ENCOUNTER
Dr. Shaikh: would you prescribe an alternative to Wegovy?    Anshul Shaikh it looks like my co pay for the shot is just as much as the shot itself so this shot is probably not going to work out for me. What other options do I have?    Blanca JAVIER RN  [facility]

## 2023-07-25 ENCOUNTER — TELEPHONE (OUTPATIENT)
Dept: FAMILY MEDICINE | Facility: CLINIC | Age: 23
End: 2023-07-25
Payer: COMMERCIAL

## 2023-07-25 DIAGNOSIS — E66.01 MORBID OBESITY WITH BMI OF 45.0-49.9, ADULT (H): Primary | ICD-10-CM

## 2023-07-25 NOTE — TELEPHONE ENCOUNTER
I sent in for Saxenda. I am unsure if it will have better coverage. Let me know.     Jv Shaikh, DO

## 2023-07-25 NOTE — TELEPHONE ENCOUNTER
We need pen needles script for the saxenda.  Thank you   Vianey Mendez. Pharmacy Technician   [facility] [place]

## 2023-07-29 NOTE — TELEPHONE ENCOUNTER
Central Prior Authorization Team   Phone: [phone]      PA Initiation    Medication: SAXENDA 18 MG/3ML SC SOPN  Insurance Company: [employer] - Phone [phone] Fax [phone]  Pharmacy Filling the Rx: [facility] - [address]  Filling Pharmacy Phone: [phone]  Filling Pharmacy Fax:    Start Date: 7/29/2023

## 2023-07-31 NOTE — TELEPHONE ENCOUNTER
Prior Authorization Approval    Medication: SAXENDA 18 MG/3ML SC SOPN  Authorization Effective Date: 7/31/2023  Authorization Expiration Date: 11/20/2023    Insurance Company: [employer] - Phone [phone] Fax [phone]  Which Pharmacy is filling the prescription: [facility] [place], [place] - [address]  Pharmacy Notified: Yes  Patient Notified: Yes (pharmacy will notify patient when ready)

## 2023-12-05 ENCOUNTER — MYC MEDICAL ADVICE (OUTPATIENT)
Dept: FAMILY MEDICINE | Facility: CLINIC | Age: 23
End: 2023-12-05
Payer: COMMERCIAL

## 2023-12-05 DIAGNOSIS — E66.01 MORBID OBESITY WITH BMI OF 45.0-49.9, ADULT (H): Primary | ICD-10-CM

## 2024-01-03 ENCOUNTER — MYC MEDICAL ADVICE (OUTPATIENT)
Dept: FAMILY MEDICINE | Facility: CLINIC | Age: 24
End: 2024-01-03
Payer: COMMERCIAL

## 2024-01-03 DIAGNOSIS — E66.01 MORBID OBESITY WITH BMI OF 45.0-49.9, ADULT (H): Primary | ICD-10-CM

## 2024-01-04 NOTE — TELEPHONE ENCOUNTER
Patient is asking about being switched from Wegovy to Zepbound due to availability.  Please advise.  Alba Landa RN  [facility]

## 2024-01-08 NOTE — TELEPHONE ENCOUNTER
Dr. Shaikh-Please review and sign if agree.  Patient confirmed pharmacy preference, which is pended.    Thank you!  PARAMJIT PanchalN, RN-BC  [facility]

## 2024-01-08 NOTE — TELEPHONE ENCOUNTER
Writer responded via [employer].  PARAMJIT PanchalN, RN-BC  [facility]

## 2024-01-16 ENCOUNTER — TELEPHONE (OUTPATIENT)
Dept: SURGERY | Facility: CLINIC | Age: 24
End: 2024-01-16
Payer: COMMERCIAL

## 2024-01-16 ENCOUNTER — VIRTUAL VISIT (OUTPATIENT)
Dept: CARDIOLOGY | Facility: CLINIC | Age: 24
End: 2024-01-16
Attending: PHYSICIAN ASSISTANT
Payer: COMMERCIAL

## 2024-01-16 VITALS — WEIGHT: 256 LBS | BODY MASS INDEX: 48.37 KG/M2

## 2024-01-16 DIAGNOSIS — E66.01 MORBID OBESITY WITH BMI OF 45.0-49.9, ADULT (H): Primary | ICD-10-CM

## 2024-01-16 ASSESSMENT — PAIN SCALES - GENERAL: PAINLEVEL: NO PAIN (0)

## 2024-01-16 NOTE — PATIENT INSTRUCTIONS
"Recommendations from today's MTM visit:                                                    MTM (medication therapy management) is a service provided by a clinical pharmacist designed to help you get the most of out of your medicines.      Start Zepbound 2.5 mg once weekly for 4 weeks then increase to Zepbound 5 mg once weekly for 4 weeks.     Follow-up with me as scheduled in March over the phone.    It was great speaking with you today.  I value your experience and would be very thankful for your time in providing feedback in our clinic survey. In the next few days, you may receive an email or text message from [employer] with a link to a survey related to your  clinical pharmacist.\"     To schedule another MTM appointment, please call the clinic directly or you may call the MTM scheduling line at [phone].    My Clinical Pharmacist's contact information:                                                      Please feel free to contact me with any questions or concerns you have.      Gagandeep Gramajo, PharmD, BCACP  Medication Therapy Management Pharmacist  [facility]    "

## 2024-01-16 NOTE — NURSING NOTE
Is the patient currently in the state of [place]? YES    Visit mode:TELEPHONE    If the visit is dropped, the patient can be reconnected by: TELEPHONE VISIT: Phone number:   Telephone Information:   Mobile [phone]       Will anyone else be joining the visit? NO  (If patient encounters technical issues they should call [phone] :[phone])    How would you like to obtain your AVS? MyChart    Are changes needed to the allergy or medication list? No, Pt stated no changes to allergies, and Pt stated no med changes    Reason for visit: Consult    Swapna SUNSHINE

## 2024-01-16 NOTE — Clinical Note
1/16/2024      RE: Kat Rodriguez  [address]  [facility] [address]       Dear Colleague,    Thank you for the opportunity to participate in the care of your patient, Kat Rodriguez, at the [facility] [place] [facility]. Please see a copy of my visit note below.    Virtual Visit Details    Type of service:  Telephone Visit   Phone call duration: 20 minutes       Medication Therapy Management (MTM) Encounter    ASSESSMENT:                            Medication Adherence/Access: No issues identified    Weight management: Weight unchanged, recent recommendation to initiate injectable weight management therapy.  Patient appropriate candidate for initiation of GLP-1/GIP agonist therapy, pretreatment BMI greater than 40 kg/m .  Negative GI symptoms at baseline.  Discussed mechanism, adverse effects, monitoring, safety with use of Zepbound.  Will advise close follow-up. Negative history of pancreatitis, medullary thyroid cancer and multiple endocrine neoplasia type 2.      For patients that are under [employer] insurance coverage, it is mandated by insurance that each qualifying patient meet with hospital based Weight Management Medication Therapy Management pharmacist to continue therapy coverage. The following patient meets the below coverage criteria and can therefore continue GLP-1/GIP agonist therapy for Weight Management:    Adult  BMI >40 with or without comorbidities   OR   BMI >30 + NAFLD*   at time of initiating GLP-1/GIP agonist therapy Approved for 29 weeks  Met Updated Initial Criteria   At least 5% weight loss of baseline body weight  Approved for 12 months        PLAN:                            Start Zepbound 2.5 mg once weekly for 4 weeks then increase to Zepbound 5 mg once weekly for 4 weeks.    Follow-up with me as scheduled in March over the phone.      SUBJECTIVE/OBJECTIVE:                           Kat Rodriguez is a 23 year old female called for an initial visit. She was referred to me from [facility] insurance requirements .      Reason for visit: GLP-1 agonist/GIP agonist consult.    Allergies/ADRs: Reviewed in chart  Past Medical History: Reviewed in chart  Tobacco: She reports that she has been smoking cigarettes. She has been exposed to tobacco smoke. She has never used smokeless tobacco.      Medication Adherence/Access: no issues reported    Weight management:  Phone consult to discuss initiation of Zepbound.  Patient notes that she was prescribed Wegovy, not able to start due to shortages.  She was prescribed Zepbound instead.  Has family members who have used injectable weight management medications. Has not used any weight loss modalities historically.  Notes that she works as a  and is regularly physically active.    Fluid/Water intake: 3 cups of water daily, recognizes is a challenge   Diet: Protein is not a problem, fiber is more challenging, has been improving over time.   Physical activity: , gym membership, 3 days/week after work, MMA and resistance training    Medical History:  MEN2/Medullary Thyroid Cancer: Negative   Pancreatitis: Negative   Baseline GI symptomatology: Negative      Current weight: 256 lb, BMI 48.37 kg/m2     Wt Readings from Last 4 Encounters:   01/16/24 116.1 kg (256 lb)   06/28/23 118.1 kg (260 lb 4.8 oz)   05/24/23 115.2 kg (254 lb)   04/22/22 94.6 kg (208 lb 9.6 oz)     Body Mass Index (BMI) Body mass index is 48.37 kg/m .    Today's Vitals: Wt 116.1 kg (256 lb)   BMI 48.37 kg/m      Lab Results   Component Value Date    A1C 5.4 04/22/2022    A1C 5.1 02/27/2020    A1C 5.5 09/23/2017    A1C 5.5 07/30/2016    A1C 5.4 01/19/2015    A1C 5.2 11/02/2013     Lab Results   Component Value Date    CHOL 135 07/30/2016     Lab Results   Component Value Date    HDL 49 07/30/2016     Lab Results   Component Value Date    LDL 76 07/30/2016      Lab Results   Component Value Date    TRIG 49 07/30/2016     Lab Results   Component Value Date    JORDENHDLRCECILE 3.4 11/02/2013     ----------------      I spent 20 minutes with this patient today. All changes were made via collaborative practice agreement with Yarely Bethea PA-C . A copy of the visit note was provided to the patient's provider(s).    A summary of these recommendations was sent via [employer].    Calvin Gramajo PharmD, BCACP  Medication Therapy Management Pharmacist  [facility]     Telemedicine Visit Details  Type of service:  Telephone visit  Start Time:  205pm  End Time:  225pm     Medication Therapy Recommendations  No medication therapy recommendations to display           Please do not hesitate to contact me if you have any questions/concerns.     Sincerely,     CALVIN GRAMAJO RP

## 2024-01-16 NOTE — PROGRESS NOTES
Medication Therapy Management (MTM) Encounter    ASSESSMENT:                            Medication Adherence/Access: No issues identified    Weight management: Weight unchanged, recent recommendation to initiate injectable weight management therapy.  Patient appropriate candidate for initiation of GLP-1/GIP agonist therapy, pretreatment BMI greater than 40 kg/m .  Negative GI symptoms at baseline.  Discussed mechanism, adverse effects, monitoring, safety with use of Zepbound.  Will advise close follow-up. Negative history of pancreatitis, medullary thyroid cancer and multiple endocrine neoplasia type 2.      For patients that are under [employer] insurance coverage, it is mandated by insurance that each qualifying patient meet with hospital based Weight Management Medication Therapy Management pharmacist to continue therapy coverage. The following patient meets the below coverage criteria and can therefore continue GLP-1/GIP agonist therapy for Weight Management:    Adult  BMI >40 with or without comorbidities   OR   BMI >30 + NAFLD*   at time of initiating GLP-1/GIP agonist therapy Approved for 29 weeks  Met Updated Initial Criteria   At least 5% weight loss of baseline body weight  Approved for 12 months        PLAN:                            Start Zepbound 2.5 mg once weekly for 4 weeks then increase to Zepbound 5 mg once weekly for 4 weeks.    Follow-up with me as scheduled in March over the phone.      SUBJECTIVE/OBJECTIVE:                          Kat Rodriguez is a 23 year old female called for an initial visit. She was referred to me from [facility] insurance requirements .      Reason for visit: GLP-1 agonist/GIP agonist consult.    Allergies/ADRs: Reviewed in chart  Past Medical History: Reviewed in chart  Tobacco: She reports that she has been smoking cigarettes. She has been exposed to tobacco smoke. She has never used smokeless tobacco.      Medication Adherence/Access: no issues  reported    Weight management:  Phone consult to discuss initiation of Zepbound.  Patient notes that she was prescribed Wegovy, not able to start due to shortages.  She was prescribed Zepbound instead.  Has family members who have used injectable weight management medications. Has not used any weight loss modalities historically.  Notes that she works as a  and is regularly physically active.    Fluid/Water intake: 3 cups of water daily, recognizes is a challenge   Diet: Protein is not a problem, fiber is more challenging, has been improving over time.   Physical activity: , gym membership, 3 days/week after work, MMA and resistance training    Medical History:  MEN2/Medullary Thyroid Cancer: Negative   Pancreatitis: Negative   Baseline GI symptomatology: Negative      Current weight: 256 lb, BMI 48.37 kg/m2     Wt Readings from Last 4 Encounters:   01/16/24 116.1 kg (256 lb)   06/28/23 118.1 kg (260 lb 4.8 oz)   05/24/23 115.2 kg (254 lb)   04/22/22 94.6 kg (208 lb 9.6 oz)     Body Mass Index (BMI) Body mass index is 48.37 kg/m .    Today's Vitals: Wt 116.1 kg (256 lb)   BMI 48.37 kg/m      Lab Results   Component Value Date    A1C 5.4 04/22/2022    A1C 5.1 02/27/2020    A1C 5.5 09/23/2017    A1C 5.5 07/30/2016    A1C 5.4 01/19/2015    A1C 5.2 11/02/2013     Lab Results   Component Value Date    CHOL 135 07/30/2016     Lab Results   Component Value Date    HDL 49 07/30/2016     Lab Results   Component Value Date    LDL 76 07/30/2016     Lab Results   Component Value Date    TRIG 49 07/30/2016     Lab Results   Component Value Date    CHOLHDLRATIO 3.4 11/02/2013     ----------------      I spent 20 minutes with this patient today. All changes were made via collaborative practice agreement with Yarely Bethea PA-C . A copy of the visit note was provided to the patient's provider(s).    A summary of these recommendations was sent via [employer].    Gagandeep Gramajo, PharmD,  BCACP  Medication Therapy Management Pharmacist  [facility]     Telemedicine Visit Details  Type of service:  Telephone visit  Start Time:  205pm  End Time:  225pm     Medication Therapy Recommendations  No medication therapy recommendations to display

## 2024-01-16 NOTE — Clinical Note
Anshul Shaikh,  I am one of the [facility] pharmacist with the weight management clinic out of the clinics and surgery center.  I met with Kat as a part of the [facility] insurance requirements and changes.  She should be able to proceed with initiation of Zepbound.  I do have a planned follow-up call with her to check in after initiation and titration.  Please let me know if you have questions or concerns.  Thank you, Rosalio

## 2024-01-16 NOTE — Clinical Note
REYNALDO Gates this patient was prescribed/advised to initiate Zepbound by primary care recently.  She has follow-up scheduled with Yulan primary care as well as scheduled follow-up with me in 2 months.  She is an appropriate candidate for use of Zepbound with negative baseline GI symptomatology.  Please let me know if you have any questions or concerns.  Thank you, Rosalio

## 2024-01-31 ENCOUNTER — VIRTUAL VISIT (OUTPATIENT)
Dept: PSYCHOLOGY | Facility: CLINIC | Age: 24
End: 2024-01-31
Payer: COMMERCIAL

## 2024-01-31 DIAGNOSIS — F33.1 MAJOR DEPRESSIVE DISORDER, RECURRENT EPISODE, MODERATE (H): ICD-10-CM

## 2024-01-31 DIAGNOSIS — F88 DEVELOPMENTAL MENTAL DISORDER: ICD-10-CM

## 2024-01-31 DIAGNOSIS — F41.1 GENERALIZED ANXIETY DISORDER: Primary | ICD-10-CM

## 2024-01-31 PROCEDURE — 90834 PSYTX W PT 45 MINUTES: CPT | Mod: 95 | Performed by: PSYCHOLOGIST

## 2024-01-31 ASSESSMENT — COLUMBIA-SUICIDE SEVERITY RATING SCALE - C-SSRS
6. HAVE YOU EVER DONE ANYTHING, STARTED TO DO ANYTHING, OR PREPARED TO DO ANYTHING TO END YOUR LIFE?: NO
2. HAVE YOU ACTUALLY HAD ANY THOUGHTS OF KILLING YOURSELF?: YES
TOTAL  NUMBER OF INTERRUPTED ATTEMPTS LIFETIME: NO
1. IN THE PAST MONTH, HAVE YOU WISHED YOU WERE DEAD OR WISHED YOU COULD GO TO SLEEP AND NOT WAKE UP?: NO
5. HAVE YOU STARTED TO WORK OUT OR WORKED OUT THE DETAILS OF HOW TO KILL YOURSELF? DO YOU INTEND TO CARRY OUT THIS PLAN?: NO
ATTEMPT LIFETIME: NO
1. HAVE YOU WISHED YOU WERE DEAD OR WISHED YOU COULD GO TO SLEEP AND NOT WAKE UP?: YES
TOTAL  NUMBER OF ABORTED OR SELF INTERRUPTED ATTEMPTS LIFETIME: NO
4. HAVE YOU HAD THESE THOUGHTS AND HAD SOME INTENTION OF ACTING ON THEM?: NO
2. HAVE YOU ACTUALLY HAD ANY THOUGHTS OF KILLING YOURSELF?: NO
3. HAVE YOU BEEN THINKING ABOUT HOW YOU MIGHT KILL YOURSELF?: NO

## 2024-01-31 ASSESSMENT — ANXIETY QUESTIONNAIRES
2. NOT BEING ABLE TO STOP OR CONTROL WORRYING: NEARLY EVERY DAY
5. BEING SO RESTLESS THAT IT IS HARD TO SIT STILL: NEARLY EVERY DAY
1. FEELING NERVOUS, ANXIOUS, OR ON EDGE: MORE THAN HALF THE DAYS
6. BECOMING EASILY ANNOYED OR IRRITABLE: NEARLY EVERY DAY
3. WORRYING TOO MUCH ABOUT DIFFERENT THINGS: NEARLY EVERY DAY
GAD7 TOTAL SCORE: 14
7. FEELING AFRAID AS IF SOMETHING AWFUL MIGHT HAPPEN: NOT AT ALL
GAD7 TOTAL SCORE: 14

## 2024-01-31 ASSESSMENT — PATIENT HEALTH QUESTIONNAIRE - PHQ9
10. IF YOU CHECKED OFF ANY PROBLEMS, HOW DIFFICULT HAVE THESE PROBLEMS MADE IT FOR YOU TO DO YOUR WORK, TAKE CARE OF THINGS AT HOME, OR GET ALONG WITH OTHER PEOPLE: SOMEWHAT DIFFICULT
SUM OF ALL RESPONSES TO PHQ QUESTIONS 1-9: 13
5. POOR APPETITE OR OVEREATING: NOT AT ALL
SUM OF ALL RESPONSES TO PHQ QUESTIONS 1-9: 13
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
## 2024-01-31 NOTE — PATIENT INSTRUCTIONS
Name: Kat Rodriguez  YOB: 2000  Date: January 31, 2024   My primary care provider: Jv Shaikh   My Triggers:  conflict with family, worsening mental health symptoms     Additional People, Places, and Things that I can access for support: family, work         What is important to me and makes life worth living: mother, friends, pets.         GREEN    Good Control  1. I feel good  2. No suicidal thoughts   3. Can work, sleep and play      Action Steps  1. Self-care: balanced meals, exercising, sleep practices, etc.  2. Take your medications as prescribed.  3. Continue meetings with therapist and prescriber.  4.  Do the healthy things that I enjoy.           YELLOW  Getting Worse  I have ANY of these:  1. I do not feel good  2. Difficulty Concentrating  3. Sleep is changing  4. Increase/Change in my thoughts to hurt self and/or others, but I can still manage and not act on it.   5. Not taking care of self.             Action Steps (in addition to the above):  1. Inform your therapist and psychiatric prescriber/PCP.  2. Keep taking your medications as prescribed.    3. Turn to people you can ask for help.  4. Use internal coping strategies -see below.  5. Create safe environment: store firearms for safety, lock and limit medications, and notify friends/family of increase in symptoms           RED  Get Help  If I have ANY of these:  1. Current and uncontrollable thoughts and/or behaviors to hurt self and/or others.    Actions to manage my safety  1. Contact your emergency person  2. Call or Text [phone]  3. Call my crisis team- [facility] [phone] [facility] Emergencies  3. Or Call 911 or go to the emergency room right away        My Internal Coping Strategies include the following:  play with my pet, exercise, and use my coping skills    [End for Brief Safety Plan]     Safety Concerns  How To Identify Situations That Make Your Mental Health Worse:  Triggers are things  that make your mental health worse.  Look at the list below to help you find your triggers and what you can do about them.     1. Identify Early Warning Signs:    Sometimes symptoms return, even when people do their best to stay well. Symptoms can develop over a short period of time with little or no warning, but most of the time they emerge gradually over several weeks.  Early warning signs are changes that people experience when a relapse is starting. Some early warning signs are common and others are not as common.   Common Early Warning Signs:    Feeling tense or nervous, Eating less or eating more, Trouble sleeping -either too much or too little sleep, Feeling depressed or low, Feeling irritable, Feeling like not being around other people, Trouble concentrating, Urges to harm self, and Urges to use drugs or alcohol     2. Identify action steps to take when warning signs are noticed:    Taking Action- It is important to take action if you are experiencing early warning signs of a relapse.  The faster you act, the more likely it is that you can avoid a full relapse.  It is helpful to identify several specific ways to cope with symptoms.      The following is my list of symptoms and coping strategies that I can use when they are present:    Symptom Coping Strategies   Anxiety -Talk with someone in your support system and let him or her know how you are feeling.  -Use relaxation techniques such as deep breathing or imagery.  -Use positive affirmations to counteract negative self-talk such as  I am learning to let go of worry.    Depression - Schedule your day; include activities you have to do and activities you enjoy doing.  - Get some exercise - walk, run, bike, or swim.  - Give yourself credit for even the smallest things you get done.   Sleep Difficulties   - Go to sleep at the same time every day.  - Do something relaxing before bed, such as drinking herbal tea or listening to music.  - Avoid having  discussions about upsetting topics before going to bed.   Delusions   - Distract yourself from the disturbing thought by doing something that requires your attention such as a puzzle.  - Check out your beliefs by talking to someone you trust.    Hallucinations   - Use headphones to listen to music.  - Tell voices to  stop  or say to yourself,  I am safe.   - Ignore the hallucinations as much as possible; focus on other things.   Concentration Difficulties - Minimize distractions so there is only one thing for you to focus on at a time.    - Ask the person you are having a conversation with to slow down or repeat things you are unsure of.

## 2024-02-07 ENCOUNTER — VIRTUAL VISIT (OUTPATIENT)
Dept: PSYCHOLOGY | Facility: CLINIC | Age: 24
End: 2024-02-07
Payer: COMMERCIAL

## 2024-02-07 DIAGNOSIS — F33.1 MAJOR DEPRESSIVE DISORDER, RECURRENT EPISODE, MODERATE (H): ICD-10-CM

## 2024-02-07 DIAGNOSIS — F41.1 GENERALIZED ANXIETY DISORDER: Primary | ICD-10-CM

## 2024-02-07 DIAGNOSIS — F88 DEVELOPMENTAL MENTAL DISORDER: ICD-10-CM

## 2024-02-07 PROCEDURE — 90791 PSYCH DIAGNOSTIC EVALUATION: CPT | Mod: 95 | Performed by: PSYCHOLOGIST
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
## 2024-02-17 ENCOUNTER — OFFICE VISIT (OUTPATIENT)
Dept: URGENT CARE | Facility: URGENT CARE | Age: 24
End: 2024-02-17
Payer: COMMERCIAL

## 2024-02-17 VITALS
DIASTOLIC BLOOD PRESSURE: 76 MMHG | BODY MASS INDEX: 46.63 KG/M2 | HEART RATE: 106 BPM | RESPIRATION RATE: 16 BRPM | TEMPERATURE: 97.5 F | WEIGHT: 246.8 LBS | SYSTOLIC BLOOD PRESSURE: 109 MMHG | OXYGEN SATURATION: 97 %

## 2024-02-17 DIAGNOSIS — B00.2 ORAL HERPES SIMPLEX INFECTION: ICD-10-CM

## 2024-02-17 DIAGNOSIS — B00.2 RECURRENT ORAL HERPES SIMPLEX: ICD-10-CM

## 2024-02-17 DIAGNOSIS — K13.0 SORE LIP: Primary | ICD-10-CM

## 2024-02-17 PROCEDURE — 86695 HERPES SIMPLEX TYPE 1 TEST: CPT | Performed by: INTERNAL MEDICINE

## 2024-02-17 PROCEDURE — 86696 HERPES SIMPLEX TYPE 2 TEST: CPT | Performed by: INTERNAL MEDICINE

## 2024-02-17 PROCEDURE — 36415 COLL VENOUS BLD VENIPUNCTURE: CPT | Performed by: INTERNAL MEDICINE

## 2024-02-17 PROCEDURE — 99213 OFFICE O/P EST LOW 20 MIN: CPT | Performed by: INTERNAL MEDICINE

## 2024-02-17 RX ORDER — VALACYCLOVIR HYDROCHLORIDE 1 G/1
2000 TABLET, FILM COATED ORAL 2 TIMES DAILY
Qty: 4 TABLET | Refills: 0 | Status: SHIPPED | OUTPATIENT
Start: 2024-02-17 | End: 2024-04-17

## 2024-02-17 ASSESSMENT — PAIN SCALES - GENERAL: PAINLEVEL: NO PAIN (0)

## 2024-02-17 NOTE — PROGRESS NOTES
"ASSESSMENT AND PLAN:      ICD-10-CM    1. Sore lip  K13.0 Herpes Simplex Virus 1 and 2 IgG     valACYclovir (VALTREX) 1000 mg tablet      2. Oral herpes simplex infection  B00.2       3. Recurrent oral herpes simplex  B00.2         Patient Instructions       Lab Test of Herpes HSV type 1 (oral) 2 (genital) blood test  You will get results by mychart    If active sore, could do swab.    If developing blister, start valtrex medication.              Katarina Keys MD  [facility] URGENT [facility]ARE    Subjective     Kat Rodriguez is a 23 year old who presents for Patient presents with:  Mouth/Lip Problem: Patient presents today requesting testing for \"oral herpes.\" Patient states she has been getting \"pimples\" around lips with burning/tingling pain; patient also reports body has been aching with headaches.     an established patient of [facility].    Hx of lip sores  Possibly pimples  Some burning currently on lower lip without active sore  Would like to get tested.    Mother with cold sores    Review of Systems        Objective    /76 (BP Location: Left arm, Patient Position: Sitting, Cuff Size: Adult Large)   Pulse 106   Temp 97.5  F (36.4  C) (Tympanic)   Resp 16   Wt 111.9 kg (246 lb 12.8 oz)   SpO2 97%   BMI 46.63 kg/m    Physical Exam  Vitals reviewed.   Constitutional:       Appearance: Normal appearance.   HENT:      Mouth/Throat:      Comments: Lips appear normal in area of burning.  Old pinpoint scar  Neurological:      Mental Status: She is alert.                  "

## 2024-02-17 NOTE — PATIENT INSTRUCTIONS
Lab Test of Herpes HSV type 1 (oral) 2 (genital) blood test  You will get results by mychart    If active sore, could do swab.    If developing blister, start valtrex medication.

## 2024-02-19 LAB
HSV1 IGG SERPL QL IA: 0.26 INDEX
HSV1 IGG SERPL QL IA: NORMAL
HSV2 IGG SERPL QL IA: 0.07 INDEX
HSV2 IGG SERPL QL IA: NORMAL

## 2024-02-20 ENCOUNTER — VIRTUAL VISIT (OUTPATIENT)
Dept: FAMILY MEDICINE | Facility: CLINIC | Age: 24
End: 2024-02-20
Payer: COMMERCIAL

## 2024-02-20 DIAGNOSIS — E66.01 MORBID OBESITY WITH BMI OF 45.0-49.9, ADULT (H): ICD-10-CM

## 2024-02-20 PROCEDURE — 99213 OFFICE O/P EST LOW 20 MIN: CPT | Mod: 95 | Performed by: FAMILY MEDICINE

## 2024-02-20 ASSESSMENT — ASTHMA QUESTIONNAIRES
QUESTION_3 LAST FOUR WEEKS HOW OFTEN DID YOUR ASTHMA SYMPTOMS (WHEEZING, COUGHING, SHORTNESS OF BREATH, CHEST TIGHTNESS OR PAIN) WAKE YOU UP AT NIGHT OR EARLIER THAN USUAL IN THE MORNING: NOT AT ALL
QUESTION_2 LAST FOUR WEEKS HOW OFTEN HAVE YOU HAD SHORTNESS OF BREATH: NOT AT ALL
ACT_TOTALSCORE: 25
QUESTION_4 LAST FOUR WEEKS HOW OFTEN HAVE YOU USED YOUR RESCUE INHALER OR NEBULIZER MEDICATION (SUCH AS ALBUTEROL): NOT AT ALL
QUESTION_5 LAST FOUR WEEKS HOW WOULD YOU RATE YOUR ASTHMA CONTROL: COMPLETELY CONTROLLED
QUESTION_1 LAST FOUR WEEKS HOW MUCH OF THE TIME DID YOUR ASTHMA KEEP YOU FROM GETTING AS MUCH DONE AT WORK, SCHOOL OR AT HOME: NONE OF THE TIME
ACT_TOTALSCORE: 25

## 2024-02-20 NOTE — PROGRESS NOTES
Kat is a 23 year old who is being evaluated via a billable video visit.      How would you like to obtain your AVS? MyChart  If the video visit is dropped, the invitation should be resent by: Text to cell phone: [phone]  Will anyone else be joining your video visit? No          Assessment & Plan     Morbid obesity with BMI of 45.0-49.9, adult (H)  -Doing well with Zepbound, no side effects  -Finds significant appetite decrease with 5 mg dosage, well stay on this dosage.  -Follow-up in 3 months in person for monitoring  - tirzepatide-Weight Management (ZEPBOUND) 5 MG/0.5ML prefilled pen  Dispense: 2 mL; Refill: 2      Subjective   Kat is a 23 year old, presenting for the following health issues:  RECHECK      2/20/2024     3:17 PM   Additional Questions   Roomed by Bhargavi BABB     History of Present Illness       Reason for visit:  Recheck    She eats 0-1 servings of fruits and vegetables daily.She consumes 0 sweetened beverage(s) daily.She exercises with enough effort to increase her heart rate 30 to 60 minutes per day.  She exercises with enough effort to increase her heart rate 3 or less days per week.   She is taking medications regularly.     Here today for weight loss follow-up.  Started Zepbound 6 weeks ago. 1 month noticed slight change in appetite.   Increased dosage has felt significant decrease in appetite. Down to one meal a day, but spreads out eating.  Cooking more. Eating more fruits and veg.   Cutting back on red meat.  Lost 16 lbs in the first 4 weeks.   Going the gym 2-3 times a week.               Objective           Vitals:  No vitals were obtained today due to virtual visit.    Physical Exam   GENERAL: alert and no distress  EYES: Eyes grossly normal to inspection.  No discharge or erythema, or obvious scleral/conjunctival abnormalities.  RESP: No audible wheeze, cough, or visible cyanosis.    SKIN: Visible skin clear. No significant rash, abnormal pigmentation or lesions.  NEURO:  Cranial nerves grossly intact.  Mentation and speech appropriate for age.  PSYCH: Appropriate affect, tone, and pace of words          Video-Visit Details    Type of service:  Video Visit     Originating Location (pt. Location): Home    Distant Location (provider location):  On-site  Platform used for Video Visit: Jamil  Signed Electronically by: Jv Shaikh DO

## 2024-04-09 DIAGNOSIS — E66.01 MORBID OBESITY WITH BMI OF 45.0-49.9, ADULT (H): ICD-10-CM

## 2024-04-16 ENCOUNTER — TELEPHONE (OUTPATIENT)
Dept: SURGERY | Facility: CLINIC | Age: 24
End: 2024-04-16
Payer: COMMERCIAL

## 2024-04-16 NOTE — TELEPHONE ENCOUNTER
Stef Rosalio,    Another patient of yours that can't get her prescription refilled because the mail order pharmacy told her she needs an MTM visit first. She tried explaining it to them that she's already seen you before and has another appointment coming up but they won't fill it. You can reach her at [phone].    Kwadwo Merino, [facility]

## 2024-04-17 ENCOUNTER — VIRTUAL VISIT (OUTPATIENT)
Dept: CARDIOLOGY | Facility: CLINIC | Age: 24
End: 2024-04-17
Attending: PHYSICIAN ASSISTANT
Payer: COMMERCIAL

## 2024-04-17 VITALS — WEIGHT: 229 LBS | BODY MASS INDEX: 43.27 KG/M2

## 2024-04-17 DIAGNOSIS — E66.01 MORBID OBESITY WITH BMI OF 45.0-49.9, ADULT (H): Primary | ICD-10-CM

## 2024-04-17 RX ORDER — TIRZEPATIDE 5 MG/.5ML
INJECTION, SOLUTION SUBCUTANEOUS
Qty: 2 ML | Refills: 0 | OUTPATIENT
Start: 2024-04-17

## 2024-04-17 ASSESSMENT — PAIN SCALES - GENERAL: PAINLEVEL: NO PAIN (0)

## 2024-04-17 NOTE — PATIENT INSTRUCTIONS
"Recommendations from today's MTM visit:                                                    MTM (medication therapy management) is a service provided by a clinical pharmacist designed to help you get the most of out of your medicines.      When able based off of supply and availability, restart Zepbound at 2.5 mg once weekly for at least 4 weeks.  You may increase to 5 mg once weekly thereafter again, dependent on supply.     Follow-up with me as scheduled in July.    It was great speaking with you today.  I value your experience and would be very thankful for your time in providing feedback in our clinic survey. In the next few days, you may receive an email or text message from [facility] [employer] with a link to a survey related to your  clinical pharmacist.\"     To schedule another MTM appointment, please call the clinic directly or you may call the MTM scheduling line at [phone].    My Clinical Pharmacist's contact information:                                                      Please feel free to contact me with any questions or concerns you have.      Gagandeep Gramajo, PharmD, BCACP  Medication Therapy Management Pharmacist  [facility]    "

## 2024-04-17 NOTE — NURSING NOTE
Is the patient currently in the state of [place]? YES    Visit mode:TELEPHONE    If the visit is dropped, the patient can be reconnected by: TELEPHONE VISIT: Phone number:   Telephone Information:   Mobile [phone]       Will anyone else be joining the visit? NO  (If patient encounters technical issues they should call [phone] :[phone])    How would you like to obtain your AVS? MyChart    Are changes needed to the allergy or medication list? No    Are refills needed on medications prescribed by this physician? NO    Reason for visit: RECHELMA SUNSHINE

## 2024-04-17 NOTE — Clinical Note
4/17/2024      RE: Kat Rodriguez  [address]  [facility] [address]       Dear Colleague,    Thank you for the opportunity to participate in the care of your patient, Kat Rodriguez, at the [facility] CL[facility] [place] at [facility]. Please see a copy of my visit note below.    Medication Therapy Management (MTM) Encounter    ASSESSMENT:                            Medication Adherence/Access: See below for considerations    Weight management: Positive, well-tolerated response to initiation of Zepbound.  Unfortunately, therapy has been interrupted currently.  Additionally, due to supply and back orders, advise reinitiation at 2.5 mg strength.  Adverse effects limited to mild, transient nausea.  Positive, notable effects on satiety, appetite.    PLAN:                            When able based off of supply and availability, restart Zepbound at 2.5 mg once weekly for at least 4 weeks.  You may increase to 5 mg once weekly thereafter again, dependent on supply.    Follow-up with me as scheduled in July.    SUBJECTIVE/OBJECTIVE:                          Kat Rodriguez is a 23 year old female called for a follow-up visit.       Reason for visit: Zepbound follow up.    Allergies/ADRs: Reviewed in chart  Past Medical History: Reviewed in chart  Tobacco: She reports that she has been smoking cigarettes. She has been exposed to tobacco smoke. She has never used smokeless tobacco.Nicotine/Tobacco Cessation Plan  Information offered: Patient not interested at this time      Medication Adherence/Access: As below    Weight management:  Phone follow-up after previous use of Zepbound.  Last dose of Zepbound was 2 weeks ago, had been on since late January. Overall, had been going well. Most notable adverse effect would be nausea. Nausea was  relatively sporadic, presents in waves, usually only for a few seconds. Usually will present 1-2 days after  injections. No vomiting. Diarrhea fairly infrequent, only associated with dietary indiscretion. Does note cravings are affected positively, early satiety. Has helped significantly with portion control. Limiting sugary foods. Has been doing a lot more meal prep and portion control. Initially with dose increase to 5 mg strength did have a degree of over-restriction with appetite, though this improved with continued use.     Wt Readings from Last 4 Encounters:   04/17/24 103.9 kg (229 lb)   02/17/24 111.9 kg (246 lb 12.8 oz)   01/16/24 116.1 kg (256 lb)   06/28/23 118.1 kg (260 lb 4.8 oz)     Body Mass Index (BMI) Body mass index is 43.27 kg/m .    Today's Vitals: Wt 103.9 kg (229 lb)   BMI 43.27 kg/m      Lab Results   Component Value Date    A1C 5.4 04/22/2022    A1C 5.1 02/27/2020    A1C 5.5 09/23/2017    A1C 5.5 07/30/2016    A1C 5.4 01/19/2015    A1C 5.2 11/02/2013     ----------------      I spent 15 minutes with this patient today. All changes were made via collaborative practice agreement with Yarely Bethea PA-C . A copy of the visit note was provided to the patient's provider(s).    A summary of these recommendations was sent via Solfo.    Gagandeep Gramajo, PharmD, BCACP  Medication Therapy Management Pharmacist  [facility]     Telemedicine Visit Details  Type of service:  Telephone visit  Start Time:  1100am  End Time:  1115am     Medication Therapy Recommendations  No medication therapy recommendations to display         Please do not hesitate to contact me if you have any questions/concerns.     Sincerely,     GAGANDEEP GRAMAJO [facility]

## 2024-04-17 NOTE — PROGRESS NOTES
Medication Therapy Management (MTM) Encounter    ASSESSMENT:                            Medication Adherence/Access: See below for considerations    Weight management: Positive, well-tolerated response to initiation of Zepbound.  Unfortunately, therapy has been interrupted currently.  Additionally, due to supply and back orders, advise reinitiation at 2.5 mg strength.  Adverse effects limited to mild, transient nausea.  Positive, notable effects on satiety, appetite.    PLAN:                            When able based off of supply and availability, restart Zepbound at 2.5 mg once weekly for at least 4 weeks.  You may increase to 5 mg once weekly thereafter again, dependent on supply.    Follow-up with me as scheduled in July.    SUBJECTIVE/OBJECTIVE:                          Kat Rodriguez is a 23 year old female called for a follow-up visit.       Reason for visit: Zepbound follow up.    Allergies/ADRs: Reviewed in chart  Past Medical History: Reviewed in chart  Tobacco: She reports that she has been smoking cigarettes. She has been exposed to tobacco smoke. She has never used smokeless tobacco.Nicotine/Tobacco Cessation Plan  Information offered: Patient not interested at this time      Medication Adherence/Access: As below    Weight management:  Phone follow-up after previous use of Zepbound.  Last dose of Zepbound was 2 weeks ago, had been on since late January. Overall, had been going well. Most notable adverse effect would be nausea. Nausea was  relatively sporadic, presents in waves, usually only for a few seconds. Usually will present 1-2 days after injections. No vomiting. Diarrhea fairly infrequent, only associated with dietary indiscretion. Does note cravings are affected positively, early satiety. Has helped significantly with portion control. Limiting sugary foods. Has been doing a lot more meal prep and portion control. Initially with dose increase to 5 mg strength did have a degree of  over-restriction with appetite, though this improved with continued use.     Wt Readings from Last 4 Encounters:   04/17/24 103.9 kg (229 lb)   02/17/24 111.9 kg (246 lb 12.8 oz)   01/16/24 116.1 kg (256 lb)   06/28/23 118.1 kg (260 lb 4.8 oz)     Body Mass Index (BMI) Body mass index is 43.27 kg/m .    Today's Vitals: Wt 103.9 kg (229 lb)   BMI 43.27 kg/m      Lab Results   Component Value Date    A1C 5.4 04/22/2022    A1C 5.1 02/27/2020    A1C 5.5 09/23/2017    A1C 5.5 07/30/2016    A1C 5.4 01/19/2015    A1C 5.2 11/02/2013     ----------------      I spent 15 minutes with this patient today. All changes were made via collaborative practice agreement with Yarely Bethea PA-C . A copy of the visit note was provided to the patient's provider(s).    A summary of these recommendations was sent via AppThwack.    Gagandeep Gramajo, PharmD, BCACP  Medication Therapy Management Pharmacist  [facility]     Telemedicine Visit Details  Type of service:  Telephone visit  Start Time:  1100am  End Time:  1115am     Medication Therapy Recommendations  No medication therapy recommendations to display

## 2024-04-24 ENCOUNTER — PATIENT OUTREACH (OUTPATIENT)
Dept: CARE COORDINATION | Facility: CLINIC | Age: 24
End: 2024-04-24
Payer: COMMERCIAL

## 2024-05-08 ENCOUNTER — PATIENT OUTREACH (OUTPATIENT)
Dept: CARE COORDINATION | Facility: CLINIC | Age: 24
End: 2024-05-08
Payer: COMMERCIAL

## 2024-07-18 ENCOUNTER — VIRTUAL VISIT (OUTPATIENT)
Dept: CARDIOLOGY | Facility: CLINIC | Age: 24
End: 2024-07-18
Attending: PHYSICIAN ASSISTANT
Payer: COMMERCIAL

## 2024-07-18 VITALS — BODY MASS INDEX: 40.78 KG/M2 | HEIGHT: 61 IN | WEIGHT: 216 LBS

## 2024-07-18 DIAGNOSIS — E66.01 MORBID OBESITY WITH BMI OF 45.0-49.9, ADULT (H): Primary | ICD-10-CM

## 2024-07-18 NOTE — PROGRESS NOTES
"Virtual Visit Details    Type of service:  Telephone Visit   Phone call duration: *** minutes   Originating Location (pt. Location): {patient location:502948::\"Home\"}  {PROVIDER LOCATION On-site should be selected for visits conducted from your clinic location or adjoining [facility] hospital, academic office, or other nearby [facility] building. Off-site should be selected for all other provider locations, including home:360968}  Distant Location (provider location):  {virtual location provider:220684}      "

## 2024-07-18 NOTE — PATIENT INSTRUCTIONS
"Recommendations from today's MTM visit:                                                    MTM (medication therapy management) is a service provided by a clinical pharmacist designed to help you get the most of out of your medicines.      As discussed, complete your supply of Zepbound 5 mg once weekly and increase to 7.5 mg once weekly thereafter.     If you do have any difficulties with nausea or other adverse effects, reduce back to 5 mg once weekly.     Follow-up with Dyan Alfred PharmD as scheduled in October.    It was great speaking with you today.  I value your experience and would be very thankful for your time in providing feedback in our clinic survey. In the next few days, you may receive an email or text message from [facility] [employer] with a link to a survey related to your  clinical pharmacist.\"     To schedule another MTM appointment, please call the clinic directly or you may call the MTM scheduling line at [phone].    My Clinical Pharmacist's contact information:                                                      Please feel free to contact me with any questions or concerns you have.      Gagandeep Gramajo PharmD, [facility]  Medication Therapy Management Pharmacist  [facility]    "

## 2024-07-18 NOTE — PROGRESS NOTES
Medication Therapy Management (MTM) Encounter    ASSESSMENT:                            Medication Adherence/Access: No issues identified    Weight management: Sustained weight loss progress with ongoing use of Zepbound 5 mg once weekly.  Therapy is very well-tolerated with adverse effects limited to mild, transient nausea without vomiting.  Her weight loss progress has sustained but become somewhat slowed of late, she expresses interest in dose increase which is reasonable given her positive tolerability profile and response to date.    PLAN:                            As discussed, complete your supply of Zepbound 5 mg once weekly and increase to 7.5 mg once weekly thereafter.    If you do have any difficulties with nausea or other adverse effects, reduce back to 5 mg once weekly.    Follow-up with Dyan Alfred PharmD as scheduled in October.    SUBJECTIVE/OBJECTIVE:                          Kat Rodriguez is a 23 year old female called for a follow-up visit.       Reason for visit: Zepbound follow up.    Allergies/ADRs: Reviewed in chart  Past Medical History: Reviewed in chart  Tobacco: She reports that she has been smoking cigarettes. She has been exposed to tobacco smoke. She has never used smokeless tobacco.Nicotine/Tobacco Cessation Plan  Information offered: Patient not interested at this time      Medication Adherence/Access: no issues reported    Weight management:  Zepbound 5 mg weekly     In April resumed use of the 2.5 mg strength, increased to the 5 mg strength afterwards, has been on for 2 months. Occasional mild, transient nausea, once weekly if that. Seemingly sporadic. No vomiting. No constipation. Loose stools with dietary triggers, sugary foods. No abdominal pain or acid reflux. Has continued to make weight loss progress, though progress has slowed. Hasn't been able to work out as much as she'd like. Notes she still encounters early satiety as well as reduced cravings, but able to eat  "adequately. Would like to evaluate her response at next strength.     Wt Readings from Last 4 Encounters:   07/18/24 98 kg (216 lb)   04/17/24 103.9 kg (229 lb)   02/17/24 111.9 kg (246 lb 12.8 oz)   01/16/24 116.1 kg (256 lb)     Body Mass Index (BMI) Body mass index is 40.16 kg/m .    Today's Vitals: Ht 1.562 m (5' 1.5\")   Wt 98 kg (216 lb)   BMI 40.16 kg/m      Lab Results   Component Value Date    A1C 5.4 04/22/2022    A1C 5.1 02/27/2020    A1C 5.5 09/23/2017    A1C 5.5 07/30/2016    A1C 5.4 01/19/2015    A1C 5.2 11/02/2013     ----------------      I spent 15 minutes with this patient today. All changes were made via collaborative practice agreement with Yarely Bethea. A copy of the visit note was provided to the patient's provider(s).    A summary of these recommendations was sent via Missingames.    Gagandeep Gramajo, PharmD, BCACP  Medication Therapy Management Pharmacist  [facility]     Telemedicine Visit Details  Type of service:  Telephone visit  Start Time:  835am  End Time:  850am     Medication Therapy Recommendations  No medication therapy recommendations to display     "

## 2024-07-18 NOTE — NURSING NOTE
Current patient location:  [place]    Is the patient currently in the state [facility]? YES    Visit mode:TELEPHONE    If the visit is dropped, the patient can be reconnected by: TELEPHONE VISIT: Phone number: [phone]    Will anyone else be joining the visit? NO  (If patient encounters technical issues they should call [phone] :[phone])    How would you like to obtain your AVS? MyChart    Are changes needed to the allergy or medication list? No    Are refills needed on medications prescribed by this physician? NO    Reason for visit: RECHECK    Cindy SUNSHINE

## 2024-07-18 NOTE — Clinical Note
7/18/2024      RE: Kat Rodriguez  [address]   [address]  [facility] [address]       Dear Colleague,    Thank you for the opportunity to participate in the care of your patient, Kat Rodriguez, at the [facility] HEART CLINIC [place] at [facility]. Please see a copy of my visit note below.    Medication Therapy Management (MTM) Encounter    ASSESSMENT:                            Medication Adherence/Access: {adherencechoices:490119}    Weight management: ***    PLAN:                            ***    SUBJECTIVE/OBJECTIVE:                          Kat Rodriguez is a 23 year old female called for a follow-up visit.       Reason for visit: Zepbound follow up.    Allergies/ADRs: Reviewed in chart  Past Medical History: Reviewed in chart  Tobacco: She reports that she has been smoking cigarettes. She has been exposed to tobacco smoke. She has never used smokeless tobacco.Nicotine/Tobacco Cessation Plan  Information offered: Patient not interested at this time      Medication Adherence/Access: no issues reported    Weight management:  Zepbound 5 mg weekly     In April returned to the 2.5 mg strength, increased to the 5 mg strength afterwards, has been on for 2 months. Occasional mild, transient nausea, once weekly if that. Seemingly sporadic. No vomiting. No constipation. Loose stools with dietary triggers, sugary foods. No abdominal pain or acid reflux. Has continued to make weight loss progress, though progress has slowed. Hasn't been able to work out as much as she'd like. Notes she still encounters early satiety as well as reduced cravings, but able to eat adequately. Would like to evaluate her response at next strength.     Wt Readings from Last 4 Encounters:   07/18/24 98 kg (216 lb)   04/17/24 103.9 kg (229 lb)   02/17/24 111.9 kg (246 lb 12.8 oz)   01/16/24 116.1 kg (256 lb)     Body Mass Index (BMI) Body mass index is 40.16 kg/m .    Today's  "Vitals: Ht 1.562 m (5' 1.5\")   Wt 98 kg (216 lb)   BMI 40.16 kg/m      Lab Results   Component Value Date    A1C 5.4 04/22/2022    A1C 5.1 02/27/2020    A1C 5.5 09/23/2017    A1C 5.5 07/30/2016    A1C 5.4 01/19/2015    A1C 5.2 11/02/2013     ----------------  {SO?:282973}    I spent {mtm total time 3:088205} with this patient today. { :955031}. A copy of the visit note was provided to the patient's provider(s).    A summary of these recommendations {GIVEN/NOT GIVEN:389123}.    Gagandeep Gramajo, PharmD, BCACP  Medication Therapy Management Pharmacist  [facility]     Telemedicine Visit Details  Type of service:  {telemedvisitmtm:170925::\"Telephone visit\"}  Start Time: {video/phone visit start time:397203}  End Time: {video/phone visit end time:066723}     Medication Therapy Recommendations  No medication therapy recommendations to display       Virtual Visit Details    Type of service:  Telephone Visit   Phone call duration: *** minutes   Originating Location (pt. Location): {patient location:403586::\"Home\"}  {PROVIDER LOCATION On-site should be selected for visits conducted from your clinic location or adjoining [facility] hospital, academic office, or other nearby [facility] building. Off-site should be selected for all other provider locations, including home:943466}  Distant Location (provider location):  {virtual location provider:360873}          Please do not hesitate to contact me if you have any questions/concerns.     Sincerely,     GAGANDEEP GRAMAJO RPH  "

## 2024-07-26 ENCOUNTER — TELEPHONE (OUTPATIENT)
Dept: ENDOCRINOLOGY | Facility: CLINIC | Age: 24
End: 2024-07-26

## 2024-07-26 NOTE — TELEPHONE ENCOUNTER
[facility]  Fax:[phone]  Spec: [phone]  [place]: [phone]

## 2024-07-30 NOTE — TELEPHONE ENCOUNTER
PA Initiation    Medication: ZEPBOUND 7.5 MG/0.5ML SC SOAJ  Insurance Company: [employer] - Phone [phone] Fax [phone]  Pharmacy Filling the Rx: [facility] - [place], [place] - [address]  Filling Pharmacy Phone: [phone]  Filling Pharmacy Fax:    Start Date: 7/30/2024

## 2024-08-01 NOTE — TELEPHONE ENCOUNTER
Prior Authorization Approval    Medication: ZEPBOUND 7.5 MG/0.5ML SC SOAJ  Authorization Effective Date: 8/1/2024  Authorization Expiration Date: 7/31/2025  Approved Dose/Quantity:     Reference #: [id number]   Insurance Company: [employer] - Phone [phone] Fax [phone]  Which Pharmacy is filling the prescription: [facility] - [place], [place] - [address]  Pharmacy Notified: yes  Patient Notified: NO

## 2024-08-05 ENCOUNTER — MYC MEDICAL ADVICE (OUTPATIENT)
Dept: FAMILY MEDICINE | Facility: CLINIC | Age: 24
End: 2024-08-05
Payer: COMMERCIAL

## 2024-08-05 DIAGNOSIS — F32.A DEPRESSION, UNSPECIFIED DEPRESSION TYPE: ICD-10-CM

## 2024-08-05 DIAGNOSIS — R45.89 NEED FOR EMOTIONAL SUPPORT: Primary | ICD-10-CM

## 2024-08-06 NOTE — TELEPHONE ENCOUNTER
Writer replied to patient via Lipocalyxhart.  PARAMJIT SalinasN, RN (she/her)  [facility] Primary [facility] Clinic RN

## 2024-08-06 NOTE — TELEPHONE ENCOUNTER
Dr. Shaikh - margy Armijo, mental health referral pended below.    SUN Garcia, PARAMJITN, RN (she/her)  [facility] RN

## 2024-08-07 NOTE — TELEPHONE ENCOUNTER
Writer replied to patient via [employer].  PARAMJIT SalinasN, RN (she/her)  [facility] Care Clinic RN

## 2024-08-25 ENCOUNTER — HEALTH MAINTENANCE LETTER (OUTPATIENT)
Age: 24
End: 2024-08-25

## 2024-09-03 ENCOUNTER — VIRTUAL VISIT (OUTPATIENT)
Dept: PSYCHOLOGY | Facility: CLINIC | Age: 24
End: 2024-09-03
Payer: COMMERCIAL

## 2024-09-03 DIAGNOSIS — F33.1 MODERATE EPISODE OF RECURRENT MAJOR DEPRESSIVE DISORDER (H): ICD-10-CM

## 2024-09-03 DIAGNOSIS — F41.1 GAD (GENERALIZED ANXIETY DISORDER): Primary | ICD-10-CM

## 2024-09-03 PROCEDURE — 90832 PSYTX W PT 30 MINUTES: CPT | Mod: 95 | Performed by: PSYCHOLOGIST

## 2024-09-03 ASSESSMENT — PATIENT HEALTH QUESTIONNAIRE - PHQ9
SUM OF ALL RESPONSES TO PHQ QUESTIONS 1-9: 9
SUM OF ALL RESPONSES TO PHQ QUESTIONS 1-9: 9
10. IF YOU CHECKED OFF ANY PROBLEMS, HOW DIFFICULT HAVE THESE PROBLEMS MADE IT FOR YOU TO DO YOUR WORK, TAKE CARE OF THINGS AT HOME, OR GET ALONG WITH OTHER PEOPLE: SOMEWHAT DIFFICULT

## 2024-09-03 NOTE — PROGRESS NOTES
[facility]                                     Progress Note    Patient Name: Kat Rodriguez  Date: 9/3/2024         Service Type: Individual      Session Start Time: 11:00am  Session End Time: 11:20am     Session Length: 20min    Session #: 1    Attendees: Client attended alone    Service Modality:  Video Visit:      Provider verified identity through the following two step process.  Patient provided:  Patient     Telemedicine Visit: The patient's condition can be safely assessed and treated via synchronous audio and visual telemedicine encounter.      Reason for Telemedicine Visit: Patient has requested telehealth visit    Originating Site (Patient Location): Patient's home    Distant Site (Provider Location): [facility]    Consent:  The patient/guardian has verbally consented to: the potential risks and benefits of telemedicine (video visit) versus in person care; bill my insurance or make self-payment for services provided; and responsibility for payment of non-covered services.     Patient would like the video invitation sent by:  My Chart    Mode of Communication:  Video Conference via [facility]    Distant Location (Provider):  On-site    As the provider I attest to compliance with applicable laws and regulations related to telemedicine.    DATA  Interactive Complexity: No  Crisis: No        Progress Since Last Session (Related to Symptoms / Goals / Homework):   Symptoms: No change patient reported no changes in ADHD symptoms, mental health symptoms, or substance abuse. No changes in social history.     Homework:  N/A      Episode of Care Goals:  N/A     Current / Ongoing Stressors and Concerns:   Requesting an ADHD assessment but had completed the DA with another provider in /Feb and had completed the Simon's and CNS. Never completed the MMPI. I reached out to the previous provider and she will finish the assessment. This provider will send the  MMPI3 to the patient.     Treatment Objective(s) Addressed in This Session:   ADHD assessment continued.       Intervention:   ADHD assessment continued.    Assessments completed prior to visit: None     ASSESSMENT: Current Emotional / Mental Status (status of significant symptoms):   Risk status (Self / Other harm or suicidal ideation)   Patient denies current fears or concerns for personal safety.   Patient denies current or recent suicidal ideation or behaviors.   Patient denies current or recent homicidal ideation or behaviors.   Patient denies current or recent self injurious behavior or ideation.   Patient denies other safety concerns.   Patient reports there has been no change in risk factors since their last session.     Patient reports there has been no change in protective factors since their last session.     Recommended that patient call 911 or go to the local ED should there be a change in any of these risk factors.     Appearance:   Appropriate    Eye Contact:   Good    Psychomotor Behavior: Normal    Attitude:   Cooperative    Orientation:   All   Speech    Rate / Production: Normal     Volume:  Normal    Mood:    Normal   Affect:    Appropriate    Thought Content:  Clear    Thought Form:  Coherent  Logical    Insight:    Good      Medication Review:   No changes to current psychiatric medication(s)     Medication Compliance:   NA     Changes in Health Issues:   None reported     Chemical Use Review:   Substance Use: No change     Tobacco Use: No change    Diagnosis: Per previous DA in chart  MIKE  MDD, recurrent, moderate  Developmental mental disorder    Collateral Reports Completed:   Not Applicable    PLAN: (Patient Tasks / Therapist Tasks / Other)  Administer MMPI3 and patient will follow-up with previous provider for feedback session.        Destiny Velarde, PhD

## 2024-09-18 ENCOUNTER — VIRTUAL VISIT (OUTPATIENT)
Dept: PSYCHOLOGY | Facility: CLINIC | Age: 24
End: 2024-09-18
Payer: COMMERCIAL

## 2024-09-18 DIAGNOSIS — F41.1 GAD (GENERALIZED ANXIETY DISORDER): Primary | ICD-10-CM

## 2024-09-18 DIAGNOSIS — F33.1 MODERATE EPISODE OF RECURRENT MAJOR DEPRESSIVE DISORDER (H): ICD-10-CM

## 2024-09-18 PROCEDURE — 96131 PSYCL TST EVAL PHYS/QHP EA: CPT | Mod: 95 | Performed by: PSYCHOLOGIST

## 2024-09-18 PROCEDURE — 96136 PSYCL/NRPSYC TST PHY/QHP 1ST: CPT | Mod: 95 | Performed by: PSYCHOLOGIST

## 2024-09-18 PROCEDURE — 96130 PSYCL TST EVAL PHYS/QHP 1ST: CPT | Mod: 95 | Performed by: PSYCHOLOGIST

## 2024-09-18 NOTE — PROGRESS NOTES
[facility]   Mental Health & Addiction Services     Progress Note - ADHD Feedback Session     Patient Name: Kat Rodriguez  Date: 2024       Service Type:  Individual       Session Start Time: 5:00pm  Session End Time:  5:31pm     Session Length: 31 minutes    Session #: 3    Attendees: Patient attended alone    Service Modality: Video Visit:      Provider verified identity through the following two step process.  Patient provided:  Patient  and Patient address    Telemedicine Visit: The patient's condition can be safely assessed and treated via synchronous audio and visual telemedicine encounter.      Reason for Telemedicine Visit: Services only offered telehealth    Originating Site (Patient Location): Patient's home    Distant Site (Provider Location): Provider Remote Setting- Home Office    Consent:  The patient/guardian has verbally consented to: the potential risks and benefits of telemedicine (video visit) versus in person care; bill my insurance or make self-payment for services provided; and responsibility for payment of non-covered services.     Patient would like the video invitation sent by:  Send to e-mail at: [email or web address]    Mode of Communication:  [facility]    Distant Location (Provider):  Off-site    As the provider I attest to compliance with applicable laws and regulations related to telemedicine.          2023    10:25 AM 2024     8:39 AM 9/3/2024    10:27 AM   PHQ   PHQ-9 Total Score 13 13 9   Q9: Thoughts of better off dead/self-harm past 2 weeks Not at all Not at all Not at all           2023    10:26 AM 2024     8:39 AM   MIKE-7 SCORE   Total Score 10 (moderate anxiety)    Total Score 10 14         DATA      Progress Since Last Session (Related to Symptoms / Goals / Homework):   Symptoms: Stable.    Homework: Completed.      Treatment Objective(s) Addressed in  This Session:   Provided feedback on ADHD evaluation. Reviewed test results in depth. Plan of care and recommendations were discussed based on testing data. See full report attached on secondary note in this encounter.     Intervention:   Provided feedback to patient regarding testing results, diagnoses, and treatment recommendations. Test results are not consistent with an ADHD diagnosis. Symptoms are better explained by depression and anxiety disorders. Personalized suggestions regarding symptoms were offered. Patient had the opportunity to ask questions; she expressed understanding.        ASSESSMENT: Current Emotional / Mental Status (status of significant symptoms):   Risk status (Self / Other harm or suicidal ideation)   Patient denies current fears or concerns for personal safety.   Patient denies current or recent suicidal ideation or behaviors.   Patient denies current or recent homicidal ideation or behaviors.   Patient denies current or recent self injurious behavior or ideation.   Patient denies other safety concerns.   Patient reports there has been no change in risk factors since their last session.     Patient reports there has been no change in protective factors since their last session.     Recommended that patient call 911 or go to the local ED should there be a change in any of these risk factors.     Appearance:   Appropriate    Eye Contact:   Good    Psychomotor Behavior: Normal    Attitude:   Cooperative    Orientation:   All   Speech    Rate / Production: Normal     Volume:  Normal    Mood:    Depressed    Affect:    Appropriate    Thought Content:  Clear    Thought Form:  Coherent  Logical    Insight:    Good      Medication Review:   No current psychiatric medications prescribed     Medication Compliance:   NA     Changes in Health Issues:   None reported     Chemical Use Review:   Substance Use: See report attached.      Nicotine Use: Daily use (vaping and cigarettes).    Diagnosis:  1.  MIKE (generalized anxiety disorder)    2. Moderate episode of recurrent major depressive disorder (H)        PLAN:   Recommendations are outlined in full evaluation report (attached to this encounter).   Patient indicated understanding and will contact the clinic if there are further questions.    Referral for therapist - emailed [facility] clinical group and will have scheduling staff follow-up.     Parts of this documentation may have been completed using dictation software. Potential errors may result and are unintentional.       Liliane Mcclure PsyD, LP  Clinical Psychologist         Psychological Testing Services Summary       Testing Evaluation Services Base: 35251  (first 60 mins) Add-on: 59760  (each addtl 60 mins)   Record Review and Clarify Referral Question   8:50am-9:00am on 1/31/2024 10 minutes   Patient Symptom Management  11:10am-11:15am on 9/3/2024 5 minutes   Clinical Decision Making/Battery Modification   9:45am-10:00am on 2/7/2024 15 minutes   Integration/Report Generation   12:05pm-1:00pm on 9/17/2024 ([facility])  1:05pm-1:30pm on 9/17/2024 ([facility])  4:05pm-4:45pm on 9/17/2024 (MMPI-3)  6:00pm-7:15pm on 9/17/2024 (Final Report)   55 minutes  25 minutes  40 minutes  75 minutes   Interactive Feedback Session   5:00pm-5:31pm on 9/18/2024 31 minutes   Post-Service Work   5:31pm-5:46pm on 9/18/2024 15 minutes   Total Time: 271 minutes   Total Units: 1 4       Test Administration and Scoring Base: 67222  (first 30 mins) Add-on: 12578  (each addtl 30 mins)   Test Administration (Face-to-Face)  9:30am-9:32am on 1/31/2024 ([facility])  9:32am-9:34am on 1/31/2024 ([facility])  10:28am-10:30am on 2/7/2024 (MMPI-3)   2 minutes  2 minutes  2 minutes   Scoring (Non-Face-to-Face)   12:00pm-12:05pm on 9/17/2024 ([facility])  1:00pm-1:05pm on 9/17/2024 ([facility] Vital Signs)  4:00pm-4:05pm on 9/17/2024 (MMPI-3)   5 minutes  5 minutes  5 minutes   Total Time: 21 minutes   Total Units: 1 0       Diagnoses:   1. MIKE  (generalized anxiety disorder)    2. Moderate episode of recurrent major depressive disorder (H)
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
## 2024-10-04 ENCOUNTER — VIRTUAL VISIT (OUTPATIENT)
Dept: PSYCHOLOGY | Facility: CLINIC | Age: 24
End: 2024-10-04
Payer: COMMERCIAL

## 2024-10-04 DIAGNOSIS — F41.1 GAD (GENERALIZED ANXIETY DISORDER): ICD-10-CM

## 2024-10-04 DIAGNOSIS — F33.1 MODERATE EPISODE OF RECURRENT MAJOR DEPRESSIVE DISORDER (H): Primary | ICD-10-CM

## 2024-10-04 PROCEDURE — 90834 PSYTX W PT 45 MINUTES: CPT | Mod: 95
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
## 2024-10-08 ENCOUNTER — DOCUMENTATION ONLY (OUTPATIENT)
Dept: OTHER | Facility: CLINIC | Age: 24
End: 2024-10-08
Payer: COMMERCIAL

## 2024-10-22 ENCOUNTER — TELEPHONE (OUTPATIENT)
Dept: ENDOCRINOLOGY | Facility: CLINIC | Age: 24
End: 2024-10-22
Payer: COMMERCIAL

## 2024-10-22 NOTE — TELEPHONE ENCOUNTER
[facility] appointment no showed, we made one more attempt to reschedule.     Routing back to referring provider and [facility] Pharmacist Team    [facility]

## 2024-11-04 ENCOUNTER — OFFICE VISIT (OUTPATIENT)
Dept: URGENT CARE | Facility: URGENT CARE | Age: 24
End: 2024-11-04
Payer: COMMERCIAL

## 2024-11-04 VITALS
DIASTOLIC BLOOD PRESSURE: 71 MMHG | HEART RATE: 88 BPM | OXYGEN SATURATION: 98 % | TEMPERATURE: 98.4 F | BODY MASS INDEX: 38.28 KG/M2 | SYSTOLIC BLOOD PRESSURE: 116 MMHG | RESPIRATION RATE: 18 BRPM | WEIGHT: 205.9 LBS
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
## 2024-11-04 PROCEDURE — 99213 OFFICE O/P EST LOW 20 MIN: CPT | Performed by: PHYSICIAN ASSISTANT

## 2024-11-04 RX ORDER — IBUPROFEN 800 MG/1
800 TABLET, FILM COATED ORAL EVERY 8 HOURS PRN
Qty: 60 TABLET | Refills: 0 | Status: SHIPPED | OUTPATIENT
Start: 2024-11-04

## 2024-11-04 ASSESSMENT — PAIN SCALES - GENERAL: PAINLEVEL_OUTOF10: MODERATE PAIN (5)
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
## 2024-11-20 ENCOUNTER — PATIENT OUTREACH (OUTPATIENT)
Dept: CARE COORDINATION | Facility: CLINIC | Age: 24
End: 2024-11-20
Payer: COMMERCIAL

## 2025-01-14 ENCOUNTER — TELEPHONE (OUTPATIENT)
Dept: ENDOCRINOLOGY | Facility: CLINIC | Age: 25
End: 2025-01-14

## 2025-01-14 NOTE — TELEPHONE ENCOUNTER
MTM appointment no showed, we made one more attempt to reschedule.     Routing back to referring provider and MTM Pharmacist Team        Yvette Wilson  MTM

## 2025-02-20 DIAGNOSIS — E66.01 MORBID OBESITY WITH BMI OF 45.0-49.9, ADULT (H): ICD-10-CM

## 2025-02-20 RX ORDER — TIRZEPATIDE 7.5 MG/.5ML
INJECTION, SOLUTION SUBCUTANEOUS
Qty: 2 ML | Refills: 5 | OUTPATIENT
Start: 2025-02-20

## 2025-06-03 ENCOUNTER — OFFICE VISIT (OUTPATIENT)
Dept: FAMILY MEDICINE | Facility: CLINIC | Age: 25
End: 2025-06-03
Payer: COMMERCIAL

## 2025-06-03 VITALS
HEART RATE: 82 BPM | TEMPERATURE: 97.8 F | WEIGHT: 212 LBS | RESPIRATION RATE: 16 BRPM | OXYGEN SATURATION: 100 % | HEIGHT: 61 IN | SYSTOLIC BLOOD PRESSURE: 106 MMHG | BODY MASS INDEX: 40.02 KG/M2 | DIASTOLIC BLOOD PRESSURE: 68 MMHG

## 2025-06-03 DIAGNOSIS — Z11.3 SCREEN FOR STD (SEXUALLY TRANSMITTED DISEASE): ICD-10-CM

## 2025-06-03 DIAGNOSIS — J45.30 MILD PERSISTENT ASTHMA WITHOUT COMPLICATION: ICD-10-CM

## 2025-06-03 DIAGNOSIS — M54.2 CERVICAL PAIN: ICD-10-CM

## 2025-06-03 DIAGNOSIS — Z00.00 ENCOUNTER FOR ROUTINE ADULT HEALTH EXAMINATION WITHOUT ABNORMAL FINDINGS: Primary | ICD-10-CM

## 2025-06-03 DIAGNOSIS — F33.1 MODERATE EPISODE OF RECURRENT MAJOR DEPRESSIVE DISORDER (H): ICD-10-CM

## 2025-06-03 DIAGNOSIS — F41.1 GAD (GENERALIZED ANXIETY DISORDER): ICD-10-CM
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
## 2025-06-03 DIAGNOSIS — M54.50 LUMBAR PAIN: ICD-10-CM

## 2025-06-03 PROBLEM — E66.01 MORBID OBESITY WITH BMI OF 45.0-49.9, ADULT (H): Status: RESOLVED | Noted: 2018-05-18 | Resolved: 2025-06-03

## 2025-06-03 LAB
C TRACH DNA SPEC QL PROBE+SIG AMP: NEGATIVE
CHOLEST SERPL-MCNC: 158 MG/DL
EST. AVERAGE GLUCOSE BLD GHB EST-MCNC: 103 MG/DL
FASTING STATUS PATIENT QL REPORTED: YES
HBA1C MFR BLD: 5.2 % (ref 0–5.6)
HDLC SERPL-MCNC: 61 MG/DL
HIV 1+2 AB+HIV1 P24 AG SERPL QL IA: NONREACTIVE
LDLC SERPL CALC-MCNC: 87 MG/DL
N GONORRHOEA DNA SPEC QL NAA+PROBE: NEGATIVE
NONHDLC SERPL-MCNC: 97 MG/DL
SPECIMEN TYPE: NORMAL
TRIGL SERPL-MCNC: 48 MG/DL

## 2025-06-03 PROCEDURE — 99395 PREV VISIT EST AGE 18-39: CPT | Performed by: PHYSICIAN ASSISTANT

## 2025-06-03 PROCEDURE — 36415 COLL VENOUS BLD VENIPUNCTURE: CPT | Performed by: PHYSICIAN ASSISTANT

## 2025-06-03 PROCEDURE — 87389 HIV-1 AG W/HIV-1&-2 AB AG IA: CPT | Performed by: PHYSICIAN ASSISTANT

## 2025-06-03 PROCEDURE — 87591 N.GONORRHOEAE DNA AMP PROB: CPT | Performed by: PHYSICIAN ASSISTANT

## 2025-06-03 PROCEDURE — 86780 TREPONEMA PALLIDUM: CPT | Performed by: PHYSICIAN ASSISTANT

## 2025-06-03 PROCEDURE — 80061 LIPID PANEL: CPT | Performed by: PHYSICIAN ASSISTANT

## 2025-06-03 PROCEDURE — 87491 CHLMYD TRACH DNA AMP PROBE: CPT | Performed by: PHYSICIAN ASSISTANT

## 2025-06-03 PROCEDURE — 3074F SYST BP LT 130 MM HG: CPT | Performed by: PHYSICIAN ASSISTANT

## 2025-06-03 PROCEDURE — 3078F DIAST BP <80 MM HG: CPT | Performed by: PHYSICIAN ASSISTANT

## 2025-06-03 PROCEDURE — 83036 HEMOGLOBIN GLYCOSYLATED A1C: CPT | Performed by: PHYSICIAN ASSISTANT

## 2025-06-03 PROCEDURE — 99214 OFFICE O/P EST MOD 30 MIN: CPT | Mod: 25 | Performed by: PHYSICIAN ASSISTANT

## 2025-06-03 RX ORDER — ALBUTEROL SULFATE 90 UG/1
1-2 INHALANT RESPIRATORY (INHALATION) EVERY 4 HOURS PRN
Qty: 18 G | Refills: 5 | Status: SHIPPED | OUTPATIENT
Start: 2025-06-03

## 2025-06-03 SDOH — HEALTH STABILITY: PHYSICAL HEALTH: ON AVERAGE, HOW MANY MINUTES DO YOU ENGAGE IN EXERCISE AT THIS LEVEL?: 30 MIN

## 2025-06-03 SDOH — HEALTH STABILITY: PHYSICAL HEALTH: ON AVERAGE, HOW MANY DAYS PER WEEK DO YOU ENGAGE IN MODERATE TO STRENUOUS EXERCISE (LIKE A BRISK WALK)?: 3 DAYS

## 2025-06-03 ASSESSMENT — ANXIETY QUESTIONNAIRES
3. WORRYING TOO MUCH ABOUT DIFFERENT THINGS: MORE THAN HALF THE DAYS
5. BEING SO RESTLESS THAT IT IS HARD TO SIT STILL: NOT AT ALL
6. BECOMING EASILY ANNOYED OR IRRITABLE: MORE THAN HALF THE DAYS
IF YOU CHECKED OFF ANY PROBLEMS ON THIS QUESTIONNAIRE, HOW DIFFICULT HAVE THESE PROBLEMS MADE IT FOR YOU TO DO YOUR WORK, TAKE CARE OF THINGS AT HOME, OR GET ALONG WITH OTHER PEOPLE: SOMEWHAT DIFFICULT
2. NOT BEING ABLE TO STOP OR CONTROL WORRYING: MORE THAN HALF THE DAYS
1. FEELING NERVOUS, ANXIOUS, OR ON EDGE: NOT AT ALL
7. FEELING AFRAID AS IF SOMETHING AWFUL MIGHT HAPPEN: SEVERAL DAYS
GAD7 TOTAL SCORE: 7
GAD7 TOTAL SCORE: 7

## 2025-06-03 ASSESSMENT — PATIENT HEALTH QUESTIONNAIRE - PHQ9
SUM OF ALL RESPONSES TO PHQ QUESTIONS 1-9: 8
5. POOR APPETITE OR OVEREATING: NOT AT ALL

## 2025-06-03 ASSESSMENT — SOCIAL DETERMINANTS OF HEALTH (SDOH): HOW OFTEN DO YOU GET TOGETHER WITH FRIENDS OR RELATIVES?: THREE TIMES A WEEK

## 2025-06-03 ASSESSMENT — ASTHMA QUESTIONNAIRES: ACT_TOTALSCORE: 25

## 2025-06-03 NOTE — PATIENT INSTRUCTIONS
Start a Multivitamin with calcium  Exercise goal: 120 mins per week    Miky Mc  30-35 grams, 3 meals per day; 110-120 grams of protein per day
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
## 2025-06-04 ENCOUNTER — RESULTS FOLLOW-UP (OUTPATIENT)
Dept: FAMILY MEDICINE | Facility: CLINIC | Age: 25
End: 2025-06-04

## 2025-06-04 ENCOUNTER — PATIENT OUTREACH (OUTPATIENT)
Dept: CARE COORDINATION | Facility: CLINIC | Age: 25
End: 2025-06-04
Payer: COMMERCIAL

## 2025-06-04 LAB — T PALLIDUM AB SER QL: NONREACTIVE

## 2025-06-26 ENCOUNTER — THERAPY VISIT (OUTPATIENT)
Dept: PHYSICAL THERAPY | Facility: CLINIC | Age: 25
End: 2025-06-26
Attending: PHYSICIAN ASSISTANT
Payer: COMMERCIAL

## 2025-06-26 DIAGNOSIS — M54.2 CERVICAL PAIN: ICD-10-CM

## 2025-06-26 DIAGNOSIS — M54.50 LUMBAR PAIN: ICD-10-CM
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
## 2025-07-01 ENCOUNTER — THERAPY VISIT (OUTPATIENT)
Dept: PHYSICAL THERAPY | Facility: CLINIC | Age: 25
End: 2025-07-01
Attending: PHYSICIAN ASSISTANT
Payer: COMMERCIAL

## 2025-07-01 DIAGNOSIS — M54.9 PAIN, UPPER BACK: Primary | ICD-10-CM

## 2025-07-01 PROCEDURE — 97140 MANUAL THERAPY 1/> REGIONS: CPT | Mod: GP | Performed by: PHYSICAL MEDICINE & REHABILITATION

## 2025-07-01 PROCEDURE — 97110 THERAPEUTIC EXERCISES: CPT | Mod: GP | Performed by: PHYSICAL MEDICINE & REHABILITATION

## 2025-07-10 ENCOUNTER — THERAPY VISIT (OUTPATIENT)
Dept: PHYSICAL THERAPY | Facility: CLINIC | Age: 25
End: 2025-07-10
Attending: PHYSICIAN ASSISTANT
Payer: COMMERCIAL

## 2025-07-10 DIAGNOSIS — G89.29 CHRONIC UPPER BACK PAIN: Primary | ICD-10-CM

## 2025-07-10 DIAGNOSIS — M54.9 CHRONIC UPPER BACK PAIN: Primary | ICD-10-CM
